# Patient Record
Sex: MALE | Race: WHITE | Employment: OTHER | URBAN - METROPOLITAN AREA
[De-identification: names, ages, dates, MRNs, and addresses within clinical notes are randomized per-mention and may not be internally consistent; named-entity substitution may affect disease eponyms.]

---

## 2017-01-09 LAB — DIABETIC RETINOPATHY: NEGATIVE

## 2018-04-10 DIAGNOSIS — E11.9 CONTROLLED TYPE 2 DIABETES MELLITUS WITHOUT COMPLICATION, UNSPECIFIED LONG TERM INSULIN USE STATUS: Primary | ICD-10-CM

## 2018-04-10 DIAGNOSIS — I10 ESSENTIAL HYPERTENSION: ICD-10-CM

## 2018-04-18 ENCOUNTER — HOSPITAL ENCOUNTER (OUTPATIENT)
Age: 68
Discharge: HOME | End: 2018-04-18
Payer: MEDICARE

## 2018-04-18 DIAGNOSIS — E11.9 CONTROLLED TYPE 2 DIABETES MELLITUS WITHOUT COMPLICATION, UNSPECIFIED LONG TERM INSULIN USE STATUS: ICD-10-CM

## 2018-04-18 DIAGNOSIS — I10 ESSENTIAL HYPERTENSION: ICD-10-CM

## 2018-04-18 LAB
ALBUMIN SERPL-MCNC: 5 G/DL (ref 3.5–5.2)
ALP BLD-CCNC: 47 U/L (ref 40–129)
ALT SERPL-CCNC: 21 U/L (ref 0–40)
ANION GAP SERPL CALCULATED.3IONS-SCNC: 15 MMOL/L (ref 7–16)
AST SERPL-CCNC: 15 U/L (ref 0–39)
BASOPHILS ABSOLUTE: 0.09 E9/L (ref 0–0.2)
BASOPHILS RELATIVE PERCENT: 0.9 % (ref 0–2)
BILIRUB SERPL-MCNC: 0.7 MG/DL (ref 0–1.2)
BUN BLDV-MCNC: 15 MG/DL (ref 8–23)
CALCIUM SERPL-MCNC: 9.8 MG/DL (ref 8.6–10.2)
CHLORIDE BLD-SCNC: 91 MMOL/L (ref 98–107)
CHOLESTEROL, TOTAL: 197 MG/DL (ref 0–199)
CO2: 27 MMOL/L (ref 22–29)
CREAT SERPL-MCNC: 1.1 MG/DL (ref 0.7–1.2)
EOSINOPHILS ABSOLUTE: 0.22 E9/L (ref 0.05–0.5)
EOSINOPHILS RELATIVE PERCENT: 2.3 % (ref 0–6)
GFR AFRICAN AMERICAN: >60
GFR NON-AFRICAN AMERICAN: >60 ML/MIN/1.73
GLUCOSE BLD-MCNC: 138 MG/DL (ref 74–109)
HBA1C MFR BLD: 7.8 % (ref 4.8–5.9)
HCT VFR BLD CALC: 43.4 % (ref 37–54)
HDLC SERPL-MCNC: 49 MG/DL
HEMOGLOBIN: 14.7 G/DL (ref 12.5–16.5)
IMMATURE GRANULOCYTES #: 0.09 E9/L
IMMATURE GRANULOCYTES %: 0.9 % (ref 0–5)
LDL CHOLESTEROL CALCULATED: 124 MG/DL (ref 0–99)
LYMPHOCYTES ABSOLUTE: 2.58 E9/L (ref 1.5–4)
LYMPHOCYTES RELATIVE PERCENT: 26.5 % (ref 20–42)
MCH RBC QN AUTO: 30 PG (ref 26–35)
MCHC RBC AUTO-ENTMCNC: 33.9 % (ref 32–34.5)
MCV RBC AUTO: 88.6 FL (ref 80–99.9)
MONOCYTES ABSOLUTE: 0.94 E9/L (ref 0.1–0.95)
MONOCYTES RELATIVE PERCENT: 9.7 % (ref 2–12)
NEUTROPHILS ABSOLUTE: 5.81 E9/L (ref 1.8–7.3)
NEUTROPHILS RELATIVE PERCENT: 59.7 % (ref 43–80)
PDW BLD-RTO: 13.2 FL (ref 11.5–15)
PLATELET # BLD: 244 E9/L (ref 130–450)
PMV BLD AUTO: 10.5 FL (ref 7–12)
POTASSIUM SERPL-SCNC: 4 MMOL/L (ref 3.5–5)
RBC # BLD: 4.9 E12/L (ref 3.8–5.8)
SODIUM BLD-SCNC: 133 MMOL/L (ref 132–146)
TOTAL PROTEIN: 8.4 G/DL (ref 6.4–8.3)
TRIGL SERPL-MCNC: 118 MG/DL (ref 0–149)
VLDLC SERPL CALC-MCNC: 24 MG/DL
WBC # BLD: 9.7 E9/L (ref 4.5–11.5)

## 2018-04-18 PROCEDURE — 80061 LIPID PANEL: CPT

## 2018-04-18 PROCEDURE — 83036 HEMOGLOBIN GLYCOSYLATED A1C: CPT

## 2018-04-18 PROCEDURE — 85025 COMPLETE CBC W/AUTO DIFF WBC: CPT

## 2018-04-18 PROCEDURE — 80053 COMPREHEN METABOLIC PANEL: CPT

## 2018-04-18 PROCEDURE — 36415 COLL VENOUS BLD VENIPUNCTURE: CPT

## 2018-04-26 ENCOUNTER — OFFICE VISIT (OUTPATIENT)
Dept: FAMILY MEDICINE CLINIC | Age: 68
End: 2018-04-26
Payer: MEDICARE

## 2018-04-26 VITALS
SYSTOLIC BLOOD PRESSURE: 120 MMHG | RESPIRATION RATE: 16 BRPM | WEIGHT: 216 LBS | HEIGHT: 70 IN | OXYGEN SATURATION: 97 % | BODY MASS INDEX: 30.92 KG/M2 | HEART RATE: 86 BPM | DIASTOLIC BLOOD PRESSURE: 80 MMHG

## 2018-04-26 DIAGNOSIS — R06.09 DYSPNEA ON EXERTION: ICD-10-CM

## 2018-04-26 DIAGNOSIS — Z79.899 MEDICATION MANAGEMENT: ICD-10-CM

## 2018-04-26 DIAGNOSIS — E11.9 TYPE 2 DIABETES MELLITUS WITHOUT COMPLICATION, WITHOUT LONG-TERM CURRENT USE OF INSULIN (HCC): Primary | ICD-10-CM

## 2018-04-26 DIAGNOSIS — I48.91 NEW ONSET ATRIAL FIBRILLATION (HCC): ICD-10-CM

## 2018-04-26 LAB
DLCO %PRED: NORMAL
DLCO/VA %PRED: NORMAL
DLCO: NORMAL ML/MMHG SEC
FEF 25-75% PRE PRED: NORMAL
FEF 25-75%-PRE: NORMAL
FEV1 %PRED-PRE: NORMAL
FEV1/FVC PRED: NORMAL
FEV1/FVC: NORMAL %
FEV1: NORMAL LITERS
FEV3 PRE: NORMAL
FVC %PRED-PRE: NORMAL
FVC: NORMAL LITERS
MEP: NORMAL
MIP: NORMAL
PEF %PRED-PRE: NORMAL
PEF-PRE: NORMAL L/SEC
TLC %PRED: NORMAL
TLC: NORMAL LITERS

## 2018-04-26 PROCEDURE — 93000 ELECTROCARDIOGRAM COMPLETE: CPT | Performed by: FAMILY MEDICINE

## 2018-04-26 PROCEDURE — 99214 OFFICE O/P EST MOD 30 MIN: CPT | Performed by: FAMILY MEDICINE

## 2018-04-26 RX ORDER — LISINOPRIL AND HYDROCHLOROTHIAZIDE 25; 20 MG/1; MG/1
1 TABLET ORAL DAILY
Qty: 90 TABLET | Refills: 3 | Status: SHIPPED | OUTPATIENT
Start: 2018-04-26 | End: 2018-06-19 | Stop reason: SDUPTHER

## 2018-04-26 RX ORDER — METOPROLOL SUCCINATE 25 MG/1
25 TABLET, EXTENDED RELEASE ORAL DAILY
Qty: 30 TABLET | Refills: 3 | Status: SHIPPED | OUTPATIENT
Start: 2018-04-26 | End: 2018-08-12 | Stop reason: SDUPTHER

## 2018-04-26 RX ORDER — ATORVASTATIN CALCIUM 80 MG/1
80 TABLET, FILM COATED ORAL DAILY
Qty: 90 TABLET | Refills: 3 | Status: SHIPPED | OUTPATIENT
Start: 2018-04-26 | End: 2019-04-02 | Stop reason: SDUPTHER

## 2018-04-26 ASSESSMENT — ENCOUNTER SYMPTOMS
HEARTBURN: 0
NAUSEA: 0
WHEEZING: 0
COUGH: 0
HEMOPTYSIS: 0
ABDOMINAL PAIN: 0
SHORTNESS OF BREATH: 1
SPUTUM PRODUCTION: 0
BACK PAIN: 0
VOMITING: 0

## 2018-04-26 ASSESSMENT — PATIENT HEALTH QUESTIONNAIRE - PHQ9
SUM OF ALL RESPONSES TO PHQ QUESTIONS 1-9: 0
SUM OF ALL RESPONSES TO PHQ9 QUESTIONS 1 & 2: 0
2. FEELING DOWN, DEPRESSED OR HOPELESS: 0
1. LITTLE INTEREST OR PLEASURE IN DOING THINGS: 0

## 2018-05-03 ENCOUNTER — HOSPITAL ENCOUNTER (OUTPATIENT)
Dept: PULMONOLOGY | Age: 68
Discharge: HOME | End: 2018-05-03
Payer: MEDICARE

## 2018-05-03 PROCEDURE — 94010 BREATHING CAPACITY TEST: CPT

## 2018-05-08 ENCOUNTER — OFFICE VISIT (OUTPATIENT)
Dept: CARDIOLOGY CLINIC | Age: 68
End: 2018-05-08
Payer: MEDICARE

## 2018-05-08 VITALS
WEIGHT: 218 LBS | HEIGHT: 69 IN | SYSTOLIC BLOOD PRESSURE: 122 MMHG | DIASTOLIC BLOOD PRESSURE: 82 MMHG | BODY MASS INDEX: 32.29 KG/M2 | HEART RATE: 83 BPM | RESPIRATION RATE: 18 BRPM

## 2018-05-08 DIAGNOSIS — I10 ESSENTIAL HYPERTENSION: Primary | ICD-10-CM

## 2018-05-08 DIAGNOSIS — R07.9 CHEST PAIN, UNSPECIFIED TYPE: ICD-10-CM

## 2018-05-08 DIAGNOSIS — I48.91 ATRIAL FIBRILLATION, UNSPECIFIED TYPE (HCC): ICD-10-CM

## 2018-05-08 PROCEDURE — 99203 OFFICE O/P NEW LOW 30 MIN: CPT | Performed by: INTERNAL MEDICINE

## 2018-05-08 PROCEDURE — 93000 ELECTROCARDIOGRAM COMPLETE: CPT | Performed by: INTERNAL MEDICINE

## 2018-05-08 RX ORDER — ESOMEPRAZOLE MAGNESIUM 40 MG/1
40 CAPSULE, DELAYED RELEASE ORAL DAILY
COMMUNITY

## 2018-05-17 ENCOUNTER — HOSPITAL ENCOUNTER (OUTPATIENT)
Dept: CARDIOLOGY | Age: 68
Discharge: HOME | End: 2018-05-17
Payer: MEDICARE

## 2018-05-17 VITALS
WEIGHT: 218 LBS | SYSTOLIC BLOOD PRESSURE: 114 MMHG | HEART RATE: 83 BPM | HEIGHT: 69 IN | BODY MASS INDEX: 32.29 KG/M2 | DIASTOLIC BLOOD PRESSURE: 74 MMHG

## 2018-05-17 DIAGNOSIS — I10 ESSENTIAL HYPERTENSION: ICD-10-CM

## 2018-05-17 DIAGNOSIS — R07.9 CHEST PAIN, UNSPECIFIED TYPE: Primary | ICD-10-CM

## 2018-05-17 DIAGNOSIS — I48.91 ATRIAL FIBRILLATION, UNSPECIFIED TYPE (HCC): ICD-10-CM

## 2018-05-17 LAB
LV EF: 60 %
LVEF MODALITY: NORMAL

## 2018-05-17 PROCEDURE — 2580000003 HC RX 258: Performed by: INTERNAL MEDICINE

## 2018-05-17 PROCEDURE — 6360000002 HC RX W HCPCS: Performed by: INTERNAL MEDICINE

## 2018-05-17 PROCEDURE — 78452 HT MUSCLE IMAGE SPECT MULT: CPT

## 2018-05-17 PROCEDURE — A9500 TC99M SESTAMIBI: HCPCS | Performed by: INTERNAL MEDICINE

## 2018-05-17 PROCEDURE — 93306 TTE W/DOPPLER COMPLETE: CPT

## 2018-05-17 PROCEDURE — 3430000000 HC RX DIAGNOSTIC RADIOPHARMACEUTICAL: Performed by: INTERNAL MEDICINE

## 2018-05-17 PROCEDURE — 93017 CV STRESS TEST TRACING ONLY: CPT

## 2018-05-17 RX ORDER — SODIUM CHLORIDE 0.9 % (FLUSH) 0.9 %
10 SYRINGE (ML) INJECTION PRN
Status: DISCONTINUED | OUTPATIENT
Start: 2018-05-17 | End: 2018-05-18 | Stop reason: HOSPADM

## 2018-05-17 RX ADMIN — Medication 10 ML: at 08:00

## 2018-05-17 RX ADMIN — Medication 10 ML: at 06:47

## 2018-05-17 RX ADMIN — Medication 31.7 MILLICURIE: at 07:59

## 2018-05-17 RX ADMIN — Medication 10 ML: at 07:59

## 2018-05-17 RX ADMIN — REGADENOSON 0.4 MG: 0.08 INJECTION, SOLUTION INTRAVENOUS at 07:59

## 2018-05-17 RX ADMIN — Medication 10.1 MILLICURIE: at 06:47

## 2018-05-21 ENCOUNTER — OFFICE VISIT (OUTPATIENT)
Dept: FAMILY MEDICINE CLINIC | Age: 68
End: 2018-05-21
Payer: MEDICARE

## 2018-05-21 VITALS
DIASTOLIC BLOOD PRESSURE: 80 MMHG | RESPIRATION RATE: 16 BRPM | BODY MASS INDEX: 32.58 KG/M2 | SYSTOLIC BLOOD PRESSURE: 120 MMHG | HEIGHT: 69 IN | HEART RATE: 72 BPM | OXYGEN SATURATION: 96 % | WEIGHT: 220 LBS

## 2018-05-21 DIAGNOSIS — J42 CHRONIC BRONCHITIS, UNSPECIFIED CHRONIC BRONCHITIS TYPE (HCC): ICD-10-CM

## 2018-05-21 DIAGNOSIS — J30.2 CHRONIC SEASONAL ALLERGIC RHINITIS, UNSPECIFIED TRIGGER: ICD-10-CM

## 2018-05-21 DIAGNOSIS — I48.91 ATRIAL FIBRILLATION, UNSPECIFIED TYPE (HCC): Primary | ICD-10-CM

## 2018-05-21 PROCEDURE — 99214 OFFICE O/P EST MOD 30 MIN: CPT | Performed by: FAMILY MEDICINE

## 2018-05-21 RX ORDER — LEVOCETIRIZINE DIHYDROCHLORIDE 5 MG/1
5 TABLET, FILM COATED ORAL NIGHTLY
Qty: 30 TABLET | Refills: 11 | COMMUNITY
Start: 2018-05-21 | End: 2018-07-27

## 2018-05-21 RX ORDER — FLUTICASONE FUROATE AND VILANTEROL 100; 25 UG/1; UG/1
1 POWDER RESPIRATORY (INHALATION) DAILY
Qty: 1 EACH | Refills: 5 | Status: SHIPPED | OUTPATIENT
Start: 2018-05-21 | End: 2018-10-11

## 2018-05-21 RX ORDER — AZELASTINE 1 MG/ML
1 SPRAY, METERED NASAL 2 TIMES DAILY
Qty: 1 BOTTLE | Refills: 5 | Status: SHIPPED | OUTPATIENT
Start: 2018-05-21 | End: 2018-10-11

## 2018-05-21 ASSESSMENT — ENCOUNTER SYMPTOMS
SHORTNESS OF BREATH: 1
COUGH: 1
WHEEZING: 0
ABDOMINAL PAIN: 0

## 2018-05-22 ENCOUNTER — TELEPHONE (OUTPATIENT)
Dept: CARDIOLOGY CLINIC | Age: 68
End: 2018-05-22

## 2018-05-31 ENCOUNTER — OFFICE VISIT (OUTPATIENT)
Dept: NON INVASIVE DIAGNOSTICS | Age: 68
End: 2018-05-31
Payer: MEDICARE

## 2018-05-31 VITALS
HEIGHT: 69 IN | SYSTOLIC BLOOD PRESSURE: 112 MMHG | BODY MASS INDEX: 32.53 KG/M2 | DIASTOLIC BLOOD PRESSURE: 76 MMHG | HEART RATE: 74 BPM | RESPIRATION RATE: 18 BRPM | WEIGHT: 219.6 LBS

## 2018-05-31 DIAGNOSIS — I48.19 PERSISTENT ATRIAL FIBRILLATION (HCC): Primary | ICD-10-CM

## 2018-05-31 DIAGNOSIS — E66.09 CLASS 1 OBESITY DUE TO EXCESS CALORIES WITH SERIOUS COMORBIDITY AND BODY MASS INDEX (BMI) OF 32.0 TO 32.9 IN ADULT: ICD-10-CM

## 2018-05-31 DIAGNOSIS — I10 ESSENTIAL HYPERTENSION: ICD-10-CM

## 2018-05-31 DIAGNOSIS — G47.30 SLEEP-DISORDERED BREATHING: ICD-10-CM

## 2018-05-31 PROCEDURE — 93000 ELECTROCARDIOGRAM COMPLETE: CPT | Performed by: INTERNAL MEDICINE

## 2018-05-31 PROCEDURE — 99204 OFFICE O/P NEW MOD 45 MIN: CPT | Performed by: INTERNAL MEDICINE

## 2018-06-06 PROBLEM — E66.811 CLASS 1 OBESITY DUE TO EXCESS CALORIES WITH SERIOUS COMORBIDITY AND BODY MASS INDEX (BMI) OF 32.0 TO 32.9 IN ADULT: Status: ACTIVE | Noted: 2018-06-06

## 2018-06-06 PROBLEM — G47.30 SLEEP-DISORDERED BREATHING: Status: ACTIVE | Noted: 2018-06-06

## 2018-06-06 PROBLEM — E66.09 CLASS 1 OBESITY DUE TO EXCESS CALORIES WITH SERIOUS COMORBIDITY AND BODY MASS INDEX (BMI) OF 32.0 TO 32.9 IN ADULT: Status: ACTIVE | Noted: 2018-06-06

## 2018-06-06 PROBLEM — I48.19 PERSISTENT ATRIAL FIBRILLATION (HCC): Status: ACTIVE | Noted: 2018-06-06

## 2018-06-13 ENCOUNTER — ANESTHESIA (OUTPATIENT)
Dept: INPATIENT UNIT | Age: 68
End: 2018-06-13

## 2018-06-13 ENCOUNTER — ANESTHESIA EVENT (OUTPATIENT)
Dept: INPATIENT UNIT | Age: 68
End: 2018-06-13

## 2018-06-13 ENCOUNTER — HOSPITAL ENCOUNTER (OUTPATIENT)
Dept: INPATIENT UNIT | Age: 68
Discharge: HOME | End: 2018-06-13
Payer: MEDICARE

## 2018-06-13 VITALS
DIASTOLIC BLOOD PRESSURE: 66 MMHG | RESPIRATION RATE: 11 BRPM | OXYGEN SATURATION: 100 % | SYSTOLIC BLOOD PRESSURE: 115 MMHG

## 2018-06-13 VITALS
SYSTOLIC BLOOD PRESSURE: 119 MMHG | WEIGHT: 219 LBS | OXYGEN SATURATION: 94 % | BODY MASS INDEX: 32.44 KG/M2 | TEMPERATURE: 97.8 F | HEART RATE: 76 BPM | HEIGHT: 69 IN | DIASTOLIC BLOOD PRESSURE: 71 MMHG | RESPIRATION RATE: 16 BRPM

## 2018-06-13 LAB — METER GLUCOSE: 152 MG/DL (ref 70–110)

## 2018-06-13 PROCEDURE — 7100000010 HC PHASE II RECOVERY - FIRST 15 MIN

## 2018-06-13 PROCEDURE — 82962 GLUCOSE BLOOD TEST: CPT

## 2018-06-13 PROCEDURE — 2580000003 HC RX 258: Performed by: INTERNAL MEDICINE

## 2018-06-13 PROCEDURE — 92960 CARDIOVERSION ELECTRIC EXT: CPT | Performed by: INTERNAL MEDICINE

## 2018-06-13 PROCEDURE — 3700000000 HC ANESTHESIA ATTENDED CARE

## 2018-06-13 PROCEDURE — 3700000001 HC ADD 15 MINUTES (ANESTHESIA)

## 2018-06-13 PROCEDURE — 7100000011 HC PHASE II RECOVERY - ADDTL 15 MIN

## 2018-06-13 PROCEDURE — 6360000002 HC RX W HCPCS: Performed by: NURSE ANESTHETIST, CERTIFIED REGISTERED

## 2018-06-13 PROCEDURE — 92960 CARDIOVERSION ELECTRIC EXT: CPT

## 2018-06-13 RX ORDER — SODIUM CHLORIDE 0.9 % (FLUSH) 0.9 %
10 SYRINGE (ML) INJECTION PRN
Status: DISCONTINUED | OUTPATIENT
Start: 2018-06-13 | End: 2018-06-14 | Stop reason: HOSPADM

## 2018-06-13 RX ORDER — SODIUM CHLORIDE 0.9 % (FLUSH) 0.9 %
10 SYRINGE (ML) INJECTION EVERY 12 HOURS SCHEDULED
Status: DISCONTINUED | OUTPATIENT
Start: 2018-06-13 | End: 2018-06-13 | Stop reason: SDUPTHER

## 2018-06-13 RX ORDER — SODIUM CHLORIDE 9 MG/ML
INJECTION, SOLUTION INTRAVENOUS CONTINUOUS
Status: DISCONTINUED | OUTPATIENT
Start: 2018-06-13 | End: 2018-06-14 | Stop reason: HOSPADM

## 2018-06-13 RX ORDER — SODIUM CHLORIDE 0.9 % (FLUSH) 0.9 %
10 SYRINGE (ML) INJECTION EVERY 12 HOURS SCHEDULED
Status: DISCONTINUED | OUTPATIENT
Start: 2018-06-13 | End: 2018-06-14 | Stop reason: HOSPADM

## 2018-06-13 RX ORDER — SODIUM CHLORIDE 0.9 % (FLUSH) 0.9 %
10 SYRINGE (ML) INJECTION PRN
Status: DISCONTINUED | OUTPATIENT
Start: 2018-06-13 | End: 2018-06-13 | Stop reason: SDUPTHER

## 2018-06-13 RX ORDER — PROPOFOL 10 MG/ML
INJECTION, EMULSION INTRAVENOUS PRN
Status: DISCONTINUED | OUTPATIENT
Start: 2018-06-13 | End: 2018-06-13 | Stop reason: SDUPTHER

## 2018-06-13 RX ADMIN — PROPOFOL 160 MG: 10 INJECTION, EMULSION INTRAVENOUS at 14:52

## 2018-06-13 RX ADMIN — SODIUM CHLORIDE: 9 INJECTION, SOLUTION INTRAVENOUS at 14:30

## 2018-06-13 ASSESSMENT — PAIN SCALES - GENERAL
PAINLEVEL_OUTOF10: 0

## 2018-06-13 ASSESSMENT — ENCOUNTER SYMPTOMS: SHORTNESS OF BREATH: 1

## 2018-06-13 ASSESSMENT — COPD QUESTIONNAIRES: CAT_SEVERITY: MODERATE

## 2018-06-14 LAB
EKG ATRIAL RATE: 66 BPM
EKG P AXIS: 41 DEGREES
EKG P-R INTERVAL: 210 MS
EKG Q-T INTERVAL: 412 MS
EKG QRS DURATION: 88 MS
EKG QTC CALCULATION (BAZETT): 431 MS
EKG R AXIS: 10 DEGREES
EKG T AXIS: 34 DEGREES
EKG VENTRICULAR RATE: 66 BPM

## 2018-06-19 DIAGNOSIS — Z79.899 MEDICATION MANAGEMENT: ICD-10-CM

## 2018-06-19 RX ORDER — LISINOPRIL AND HYDROCHLOROTHIAZIDE 25; 20 MG/1; MG/1
1 TABLET ORAL DAILY
Qty: 90 TABLET | Refills: 3 | Status: SHIPPED | OUTPATIENT
Start: 2018-06-19 | End: 2019-04-24 | Stop reason: SDUPTHER

## 2018-07-10 ENCOUNTER — OFFICE VISIT (OUTPATIENT)
Dept: NON INVASIVE DIAGNOSTICS | Age: 68
End: 2018-07-10
Payer: MEDICARE

## 2018-07-10 VITALS
WEIGHT: 222.2 LBS | BODY MASS INDEX: 32.91 KG/M2 | HEART RATE: 77 BPM | RESPIRATION RATE: 16 BRPM | HEIGHT: 69 IN | SYSTOLIC BLOOD PRESSURE: 112 MMHG | DIASTOLIC BLOOD PRESSURE: 80 MMHG

## 2018-07-10 DIAGNOSIS — I10 ESSENTIAL HYPERTENSION: ICD-10-CM

## 2018-07-10 DIAGNOSIS — G47.30 SLEEP-DISORDERED BREATHING: ICD-10-CM

## 2018-07-10 DIAGNOSIS — E66.09 CLASS 1 OBESITY DUE TO EXCESS CALORIES WITH SERIOUS COMORBIDITY AND BODY MASS INDEX (BMI) OF 32.0 TO 32.9 IN ADULT: ICD-10-CM

## 2018-07-10 DIAGNOSIS — I48.19 PERSISTENT ATRIAL FIBRILLATION (HCC): Primary | ICD-10-CM

## 2018-07-10 PROCEDURE — 93000 ELECTROCARDIOGRAM COMPLETE: CPT | Performed by: INTERNAL MEDICINE

## 2018-07-10 PROCEDURE — 99213 OFFICE O/P EST LOW 20 MIN: CPT | Performed by: INTERNAL MEDICINE

## 2018-07-10 NOTE — PROGRESS NOTES
[id number]    Procedure Date        05/17/2018      Corporate ID                      Ordering Physician   Mireya Vaz MD      Accession Number     [id number]    Referring Physician   Mireya Vaz MD                                                           Edwin Single      Date of Birth        1950   Sonographer           Nicole Swenson [facility]      Age                  68 year(s)   Interpreting         Mireya Vaz MD                                     Physician                                        Any Other     Procedure    Type of Study      TTE procedure:Echo Complete W/ Dop & Color Flow.     Procedure Date  Date: 05/17/2018 Start: 08:55 AM    Study Location: Echo Lab  Technical Quality: Adequate visualization    Indications:Atrial fibrillation.     Patient Status: Routine    Height: 69 inches Weight: 218 pounds BSA: 2.14 m^2 BMI: 32.19 kg/m^2    BP: 114/74 mmHg     Findings      Left Ventricle   Normal left ventricle size.   Normal left ventricle wall thickness.   No wall motion abnormalities.   Normal diastolic function.   Ejection fraction is visually estimated at 55-65%.      Right Ventricle   Normal right ventricular size and function.      Left Atrium   Left atrium is of normal size.   Atrial fibrillation      Right Atrium   Normal right atrium size.      Mitral Valve   Normal mitral valve structure and function.   Physiologic and/or trace mitral regurgitation is present.      Tricuspid Valve   Normal tricuspid valve structure and function.      Aortic Valve   Aortic valve opens well.   The aortic valve is trileaflet.   The aortic valve appears mildly sclerotic.      Pulmonic Valve   The pulmonic valve was not well visualized.      Pericardial Effusion   No evidence of pericardial effusion.      Aorta   Aortic root dimension within normal limits.      Conclusions      Summary   Normal diastolic function.   Ejection fraction is visually estimated at 55-65%.   Left atrium is of normal size.   Atrial fibrillation   Normal mitral valve structure and function.   The aortic valve is trileaflet.   The aortic valve appears mildly sclerotic.   No evidence of pericardial effusion.      Signature      ----------------------------------------------------------------   Electronically signed by Junior Mills MD(Interpreting   physician) on 05/17/2018 07:35 PM   ----------------------------------------------------------------     M-Mode/2D Measurements & Calculations      LV Diastolic    LV Systolic Dimension: 2.8   AV Cusp Separation: 1.9 cmLA   Dimension: 4.1  cm                           Dimension: 4.2 cmAO Root   cm              LV Volume Diastolic: 54.7 ml Dimension: 2.9 cm   LV FS:31.7 %    LV Volume Systolic: 56.0 ml   LV PW           LV EDV/LV EDV Index: 67.4   Diastolic: 1.2  CU/07 VD/K^5IL ESV/LV ESV   cm              Index: 29.4 ml/14ml/ m^2     RV Diastolic Dimension: 2.6   LV PW Systolic: EF Calculated: 00.8 %        cm   1.5 cm          LV Mass Index: 80 l/min*m^2   Septum          LV Length: 8.1 cm            LA/Aorta: 2   Diastolic: 1.2   cm              LVOT: 2.3 cm                 LA volume/Index: 58.7 ml   Septum                                       /78SP/Q^1   Systolic: 1.4                                RA Area: 16.7 cm^2   cm      LV Mass: 171.75   g     Doppler Measurements & Calculations      MV Peak E-Wave: 1.57 AV Peak Velocity: 1.32 LVOT Peak Velocity: 0.95 m/s   m/s                  m/s                    LVOT Mean Velocity: 0.73 m/s                        AV Peak Gradient: 6.95 LVOT Peak Gradient: 3.6   MV Peak Gradient:    mmHg                   mmHgLVOT Mean Gradient: 2.3   9.9 mmHg             AV Mean Velocity: 0.95 mmHg   MV Mean Gradient:    m/s   4.3 mmHg             AV Mean Gradient: 4   MV Mean Velocity:    mmHg   0.92 m/s             AV VTI: 27.2 cm   MV Deceleration      AV Area   Time: 152.6 msec     (Continuity):3.42 cm^2 PV Peak Velocity: 1.21 m/s   MV P1/2t: 64.4 years ago he had the same symptoms - but there was not workup done and attributed the symptoms to GERD. His last know time of being in sinus rhythm was 3 years ago. The patient presents today in atrial fibrillation and currently denies any chest pain, SOB or syncope. When in AF he feels a lack of energy and a pounding in his chest. The states he stopped smoking but did smoke for 25 years. The patient reports he has never been worked up for sleep apnea - the patient's wife states the patient does snore and he states he does not get good quality sleep. Discussed with the patient about rhythm control options including cardioversion vs AAD therapy (Tikosyn or sotalol) vs catheter ablation, explaining the risks and benefits of the procedure (see below). Also discussed lifestyle modifications (see below). The patient does report alcohol use. 1. Persistent atrial fibrillation  - CHADSVASC= 3 (HTN, DM, Age)  - their current [address] regiment includes Eliquis  - current medical therapy includes Eliquis and BB  - thus far has had 1 cardioversion with ERAF  - we had an extensive discussion regarding options between rate and rhythm control and he is currently using Toprol for rate control  - we had an extensive discussion regarding all 2ndary causes of AFib which include but are not limited to the following and then need for lifestyle modifications and stringent control of contributing medical conditions which include  - We recommended aggressive risk factor modification as previously discussed  - he has sleep study pending  - given ERAF, we discussed ablation yet again and due to his symptomatic improvement, I would recommend persistent AF ablation with RF (PVI + trigger ablation)  - discussed R/B/A previously and he is willing to proceed now (see my consult)    2. Hyperlipidemia  - managed by PCP  - on lipitor    3. HTN  - well controlled at this visit   - continue current medications    4.  DM  - managed by PCP  - on

## 2018-07-17 DIAGNOSIS — I48.19 PERSISTENT ATRIAL FIBRILLATION (HCC): Primary | ICD-10-CM

## 2018-07-17 DIAGNOSIS — Z79.899 MEDICATION MANAGEMENT: ICD-10-CM

## 2018-07-17 DIAGNOSIS — I48.91 NEW ONSET ATRIAL FIBRILLATION (HCC): ICD-10-CM

## 2018-07-17 NOTE — TELEPHONE ENCOUNTER
From: Zena Mcdowell  Sent: 7/17/2018 6:48 AM EDT  Subject: Medication Renewal Request    Zena Mcdowell would like a refill of the following medications:     apixaban (ELIQUIS) 5 MG TABS tablet Jordin Esqueda MD]    Preferred pharmacy: [facility], [address] [phone]    Comment:

## 2018-07-17 NOTE — TELEPHONE ENCOUNTER
From: Polly Miles  Sent: 7/17/2018 12:14 PM EDT  Subject: Medication Renewal Request    Polly Miles would like a refill of the following medications:     apixaban (ELIQUIS) 5 MG TABS tablet Sydnee Romero MD]    Preferred pharmacy: [facility], [address] [phone]    Comment:

## 2018-07-24 ENCOUNTER — OFFICE VISIT (OUTPATIENT)
Dept: FAMILY MEDICINE CLINIC | Age: 68
End: 2018-07-24
Payer: MEDICARE

## 2018-07-24 VITALS
DIASTOLIC BLOOD PRESSURE: 62 MMHG | RESPIRATION RATE: 16 BRPM | SYSTOLIC BLOOD PRESSURE: 99 MMHG | BODY MASS INDEX: 32.58 KG/M2 | HEIGHT: 69 IN | WEIGHT: 220 LBS | HEART RATE: 87 BPM | OXYGEN SATURATION: 98 %

## 2018-07-24 DIAGNOSIS — I48.91 ATRIAL FIBRILLATION, UNSPECIFIED TYPE (HCC): Primary | ICD-10-CM

## 2018-07-24 DIAGNOSIS — F41.8 SITUATIONAL ANXIETY: ICD-10-CM

## 2018-07-24 DIAGNOSIS — E11.9 TYPE 2 DIABETES MELLITUS WITHOUT COMPLICATION, WITHOUT LONG-TERM CURRENT USE OF INSULIN (HCC): ICD-10-CM

## 2018-07-24 LAB — HBA1C MFR BLD: 8.4 %

## 2018-07-24 PROCEDURE — 83036 HEMOGLOBIN GLYCOSYLATED A1C: CPT | Performed by: FAMILY MEDICINE

## 2018-07-24 PROCEDURE — 99214 OFFICE O/P EST MOD 30 MIN: CPT | Performed by: FAMILY MEDICINE

## 2018-07-24 RX ORDER — GLIPIZIDE 2.5 MG/1
2.5 TABLET, EXTENDED RELEASE ORAL DAILY
Qty: 30 TABLET | Refills: 5 | Status: SHIPPED | OUTPATIENT
Start: 2018-07-24 | End: 2018-08-21

## 2018-07-24 RX ORDER — DIAZEPAM 5 MG/1
TABLET ORAL
Qty: 2 TABLET | Refills: 0 | Status: SHIPPED | OUTPATIENT
Start: 2018-07-24 | End: 2018-07-27

## 2018-07-24 ASSESSMENT — ENCOUNTER SYMPTOMS
WHEEZING: 0
ABDOMINAL PAIN: 0
COUGH: 0
SHORTNESS OF BREATH: 1

## 2018-07-24 NOTE — PROGRESS NOTES
CC   Chief Complaint   Patient presents with    Diabetes    COPD     HPI:   Patient comes in today for the below issue(s). Atrial fibrillation  Follow up  Saw EP  Failed cardioversion  Remains on anticoagulation and rate control  Ablation is being considered- scheduled on August 1. Has pre-operative evaluation on Friday  Overall continues to not feel the palpitations, but is noting ongoing decreased stamina/SOB. Also had a negative sleep study. Concern was that CATRINA was contributing to his overall picture. Admits to fear of upcoming scans and claustrophobia. Asking for something before the test to help him relax. DM2:    Chronic issue, present for years  Patient feels well. Issue is somewhat controlled. Patient does not have complaints or concerns today. Medications reviewed. Currently on Januvia. Taking all medications and tolerating well. Glucose screens being checked  yes - sporadic. Was running over 200  hypoglycemic episodes no  Patient is taking ASA No: on DOAC  Taking  Ace Inhibitor/ARB. Yes  Patient is  on appropriately-dosed statin. Patient does not smoke. Admits to not following a diet and is questioning what he should be eating. Most recent labs reviewed with patient. Lab Results   Component Value Date    LABA1C 8.4 07/24/2018     No results found for: EAG  Lab Results   Component Value Date    LDLCALC 124 (H) 04/18/2018           Review of Systems  Review of Systems   Constitutional: Positive for malaise/fatigue. Negative for chills and fever. HENT: Negative for congestion. Respiratory: Positive for shortness of breath. Negative for cough and wheezing. Cardiovascular: Negative for chest pain, palpitations and leg swelling. Gastrointestinal: Negative for abdominal pain. Neurological: Negative for dizziness and loss of consciousness.        Past Medical History:   Diagnosis Date    [facility]     follows with Dr. Ruchi Au Allergic rhinitis    

## 2018-07-27 ENCOUNTER — HOSPITAL ENCOUNTER (OUTPATIENT)
Dept: PREADMISSION TESTING | Age: 68
Discharge: HOME | End: 2018-07-27
Payer: MEDICARE

## 2018-07-27 ENCOUNTER — HOSPITAL ENCOUNTER (OUTPATIENT)
Dept: CT IMAGING | Age: 68
Discharge: HOME | End: 2018-07-29
Payer: MEDICARE

## 2018-07-27 ENCOUNTER — HOSPITAL ENCOUNTER (OUTPATIENT)
Age: 68
Discharge: HOME | End: 2018-07-27
Payer: MEDICARE

## 2018-07-27 VITALS
OXYGEN SATURATION: 98 % | BODY MASS INDEX: 31.99 KG/M2 | HEIGHT: 69 IN | WEIGHT: 216 LBS | HEART RATE: 81 BPM | RESPIRATION RATE: 18 BRPM | SYSTOLIC BLOOD PRESSURE: 134 MMHG | DIASTOLIC BLOOD PRESSURE: 65 MMHG | TEMPERATURE: 97.8 F

## 2018-07-27 DIAGNOSIS — I48.19 PERSISTENT ATRIAL FIBRILLATION (HCC): ICD-10-CM

## 2018-07-27 DIAGNOSIS — Z01.812 PRE-OPERATIVE LABORATORY EXAMINATION: Primary | ICD-10-CM

## 2018-07-27 LAB
ABO/RH: NORMAL
ANION GAP SERPL CALCULATED.3IONS-SCNC: 12 MMOL/L (ref 7–16)
ANION GAP SERPL CALCULATED.3IONS-SCNC: 13 MMOL/L (ref 7–16)
ANTIBODY SCREEN: NORMAL
APTT: 33.5 SEC (ref 24.5–35.1)
BASOPHILS ABSOLUTE: 0.04 E9/L (ref 0–0.2)
BASOPHILS RELATIVE PERCENT: 0.5 % (ref 0–2)
BUN BLDV-MCNC: 18 MG/DL (ref 8–23)
BUN BLDV-MCNC: 19 MG/DL (ref 8–23)
CALCIUM SERPL-MCNC: 10.2 MG/DL (ref 8.6–10.2)
CALCIUM SERPL-MCNC: 9.8 MG/DL (ref 8.6–10.2)
CHLORIDE BLD-SCNC: 94 MMOL/L (ref 98–107)
CHLORIDE BLD-SCNC: 95 MMOL/L (ref 98–107)
CO2: 30 MMOL/L (ref 22–29)
CO2: 30 MMOL/L (ref 22–29)
CREAT SERPL-MCNC: 1.1 MG/DL (ref 0.7–1.2)
CREAT SERPL-MCNC: 1.2 MG/DL (ref 0.7–1.2)
EOSINOPHILS ABSOLUTE: 0.32 E9/L (ref 0.05–0.5)
EOSINOPHILS RELATIVE PERCENT: 4.1 % (ref 0–6)
GFR AFRICAN AMERICAN: >60
GFR AFRICAN AMERICAN: >60
GFR NON-AFRICAN AMERICAN: >60 ML/MIN/1.73
GFR NON-AFRICAN AMERICAN: >60 ML/MIN/1.73
GLUCOSE BLD-MCNC: 187 MG/DL (ref 74–109)
GLUCOSE BLD-MCNC: 196 MG/DL (ref 74–109)
HCT VFR BLD CALC: 42.3 % (ref 37–54)
HEMOGLOBIN: 14.5 G/DL (ref 12.5–16.5)
IMMATURE GRANULOCYTES #: 0.04 E9/L
IMMATURE GRANULOCYTES %: 0.5 % (ref 0–5)
INR BLD: 1.5
LYMPHOCYTES ABSOLUTE: 1.26 E9/L (ref 1.5–4)
LYMPHOCYTES RELATIVE PERCENT: 16.2 % (ref 20–42)
MAGNESIUM: 1.6 MG/DL (ref 1.6–2.6)
MCH RBC QN AUTO: 29.7 PG (ref 26–35)
MCHC RBC AUTO-ENTMCNC: 34.3 % (ref 32–34.5)
MCV RBC AUTO: 86.5 FL (ref 80–99.9)
MONOCYTES ABSOLUTE: 0.71 E9/L (ref 0.1–0.95)
MONOCYTES RELATIVE PERCENT: 9.1 % (ref 2–12)
NEUTROPHILS ABSOLUTE: 5.41 E9/L (ref 1.8–7.3)
NEUTROPHILS RELATIVE PERCENT: 69.6 % (ref 43–80)
PDW BLD-RTO: 12.7 FL (ref 11.5–15)
PLATELET # BLD: 182 E9/L (ref 130–450)
PMV BLD AUTO: 10.6 FL (ref 7–12)
POTASSIUM SERPL-SCNC: 4.3 MMOL/L (ref 3.5–5)
POTASSIUM SERPL-SCNC: 4.4 MMOL/L (ref 3.5–5)
PROTHROMBIN TIME: 17.1 SEC (ref 9.3–12.4)
RBC # BLD: 4.89 E12/L (ref 3.8–5.8)
SODIUM BLD-SCNC: 136 MMOL/L (ref 132–146)
SODIUM BLD-SCNC: 138 MMOL/L (ref 132–146)
WBC # BLD: 7.8 E9/L (ref 4.5–11.5)

## 2018-07-27 PROCEDURE — 80048 BASIC METABOLIC PNL TOTAL CA: CPT

## 2018-07-27 PROCEDURE — 36415 COLL VENOUS BLD VENIPUNCTURE: CPT

## 2018-07-27 PROCEDURE — 83735 ASSAY OF MAGNESIUM: CPT

## 2018-07-27 PROCEDURE — 86900 BLOOD TYPING SEROLOGIC ABO: CPT

## 2018-07-27 PROCEDURE — 85610 PROTHROMBIN TIME: CPT

## 2018-07-27 PROCEDURE — 85730 THROMBOPLASTIN TIME PARTIAL: CPT

## 2018-07-27 PROCEDURE — 86901 BLOOD TYPING SEROLOGIC RH(D): CPT

## 2018-07-27 PROCEDURE — 6360000004 HC RX CONTRAST MEDICATION: Performed by: RADIOLOGY

## 2018-07-27 PROCEDURE — 86850 RBC ANTIBODY SCREEN: CPT

## 2018-07-27 PROCEDURE — 85025 COMPLETE CBC W/AUTO DIFF WBC: CPT

## 2018-07-27 PROCEDURE — 75572 CT HRT W/3D IMAGE: CPT

## 2018-07-27 RX ADMIN — IOPAMIDOL 60 ML: 755 INJECTION, SOLUTION INTRAVENOUS at 09:02

## 2018-08-01 ENCOUNTER — ANESTHESIA EVENT (OUTPATIENT)
Dept: NON INVASIVE DIAGNOSTICS | Age: 68
End: 2018-08-01

## 2018-08-01 ENCOUNTER — ANESTHESIA (OUTPATIENT)
Dept: NON INVASIVE DIAGNOSTICS | Age: 68
End: 2018-08-01

## 2018-08-01 ENCOUNTER — HOSPITAL ENCOUNTER (OUTPATIENT)
Dept: NON INVASIVE DIAGNOSTICS | Age: 68
Discharge: HOME | End: 2018-08-02
Attending: INTERNAL MEDICINE | Admitting: INTERNAL MEDICINE
Payer: MEDICARE

## 2018-08-01 VITALS
RESPIRATION RATE: 12 BRPM | OXYGEN SATURATION: 100 % | SYSTOLIC BLOOD PRESSURE: 124 MMHG | TEMPERATURE: 97.5 F | DIASTOLIC BLOOD PRESSURE: 75 MMHG

## 2018-08-01 DIAGNOSIS — Z86.79 S/P RADIOFREQUENCY ABLATION OPERATION FOR ARRHYTHMIA: ICD-10-CM

## 2018-08-01 DIAGNOSIS — Z98.890 S/P RADIOFREQUENCY ABLATION OPERATION FOR ARRHYTHMIA: ICD-10-CM

## 2018-08-01 LAB
EKG ATRIAL RATE: 89 BPM
EKG P AXIS: 60 DEGREES
EKG P-R INTERVAL: 224 MS
EKG Q-T INTERVAL: 384 MS
EKG QRS DURATION: 90 MS
EKG QTC CALCULATION (BAZETT): 467 MS
EKG R AXIS: 32 DEGREES
EKG T AXIS: 53 DEGREES
EKG VENTRICULAR RATE: 89 BPM
METER GLUCOSE: 271 MG/DL (ref 70–110)
METER GLUCOSE: 297 MG/DL (ref 70–110)

## 2018-08-01 PROCEDURE — 93312 ECHO TRANSESOPHAGEAL: CPT

## 2018-08-01 PROCEDURE — 2580000003 HC RX 258: Performed by: INTERNAL MEDICINE

## 2018-08-01 PROCEDURE — 2580000003 HC RX 258: Performed by: NURSE ANESTHETIST, CERTIFIED REGISTERED

## 2018-08-01 PROCEDURE — 3700000001 HC ADD 15 MINUTES (ANESTHESIA)

## 2018-08-01 PROCEDURE — 3700000000 HC ANESTHESIA ATTENDED CARE

## 2018-08-01 PROCEDURE — 2580000003 HC RX 258

## 2018-08-01 PROCEDURE — 93010 ELECTROCARDIOGRAM REPORT: CPT | Performed by: INTERNAL MEDICINE

## 2018-08-01 PROCEDURE — 2500000003 HC RX 250 WO HCPCS

## 2018-08-01 PROCEDURE — 7100000001 HC PACU RECOVERY - ADDTL 15 MIN

## 2018-08-01 PROCEDURE — 76937 US GUIDE VASCULAR ACCESS: CPT | Performed by: INTERNAL MEDICINE

## 2018-08-01 PROCEDURE — 93657 TX L/R ATRIAL FIB ADDL: CPT | Performed by: INTERNAL MEDICINE

## 2018-08-01 PROCEDURE — 93005 ELECTROCARDIOGRAM TRACING: CPT | Performed by: INTERNAL MEDICINE

## 2018-08-01 PROCEDURE — C1894 INTRO/SHEATH, NON-LASER: HCPCS

## 2018-08-01 PROCEDURE — 93325 DOPPLER ECHO COLOR FLOW MAPG: CPT

## 2018-08-01 PROCEDURE — 82962 GLUCOSE BLOOD TEST: CPT

## 2018-08-01 PROCEDURE — 6360000002 HC RX W HCPCS

## 2018-08-01 PROCEDURE — 93656 COMPRE EP EVAL ABLTJ ATR FIB: CPT | Performed by: INTERNAL MEDICINE

## 2018-08-01 PROCEDURE — C1733 CATH, EP, OTHR THAN COOL-TIP: HCPCS

## 2018-08-01 PROCEDURE — 93662 INTRACARDIAC ECG (ICE): CPT | Performed by: INTERNAL MEDICINE

## 2018-08-01 PROCEDURE — 93613 INTRACARDIAC EPHYS 3D MAPG: CPT | Performed by: INTERNAL MEDICINE

## 2018-08-01 PROCEDURE — 7100000000 HC PACU RECOVERY - FIRST 15 MIN

## 2018-08-01 PROCEDURE — 2709999900 HC NON-CHARGEABLE SUPPLY

## 2018-08-01 PROCEDURE — C1759 CATH, INTRA ECHOCARDIOGRAPHY: HCPCS

## 2018-08-01 PROCEDURE — 6360000002 HC RX W HCPCS: Performed by: NURSE ANESTHETIST, CERTIFIED REGISTERED

## 2018-08-01 PROCEDURE — C1731 CATH, EP, 20 OR MORE ELEC: HCPCS

## 2018-08-01 PROCEDURE — 2720000010 HC SURG SUPPLY STERILE

## 2018-08-01 PROCEDURE — 93655 ICAR CATH ABLTJ DSCRT ARRHYT: CPT | Performed by: INTERNAL MEDICINE

## 2018-08-01 PROCEDURE — 93321 DOPPLER ECHO F-UP/LMTD STD: CPT

## 2018-08-01 PROCEDURE — C1769 GUIDE WIRE: HCPCS

## 2018-08-01 PROCEDURE — 2500000003 HC RX 250 WO HCPCS: Performed by: NURSE ANESTHETIST, CERTIFIED REGISTERED

## 2018-08-01 PROCEDURE — C1732 CATH, EP, DIAG/ABL, 3D/VECT: HCPCS

## 2018-08-01 PROCEDURE — C1730 CATH, EP, 19 OR FEW ELECT: HCPCS

## 2018-08-01 PROCEDURE — 6370000000 HC RX 637 (ALT 250 FOR IP): Performed by: INTERNAL MEDICINE

## 2018-08-01 RX ORDER — PROTAMINE SULFATE 10 MG/ML
INJECTION, SOLUTION INTRAVENOUS PRN
Status: DISCONTINUED | OUTPATIENT
Start: 2018-08-01 | End: 2018-08-01 | Stop reason: SDUPTHER

## 2018-08-01 RX ORDER — SODIUM CHLORIDE 9 MG/ML
INJECTION, SOLUTION INTRAVENOUS CONTINUOUS PRN
Status: DISCONTINUED | OUTPATIENT
Start: 2018-08-01 | End: 2018-08-01 | Stop reason: SDUPTHER

## 2018-08-01 RX ORDER — PHENYLEPHRINE HYDROCHLORIDE 10 MG/ML
INJECTION INTRAVENOUS PRN
Status: DISCONTINUED | OUTPATIENT
Start: 2018-08-01 | End: 2018-08-01 | Stop reason: SDUPTHER

## 2018-08-01 RX ORDER — GLIPIZIDE 5 MG/1
2.5 TABLET ORAL
Status: DISCONTINUED | OUTPATIENT
Start: 2018-08-02 | End: 2018-08-02 | Stop reason: HOSPADM

## 2018-08-01 RX ORDER — SODIUM CHLORIDE 9 MG/ML
INJECTION, SOLUTION INTRAVENOUS ONCE
Status: COMPLETED | OUTPATIENT
Start: 2018-08-01 | End: 2018-08-01

## 2018-08-01 RX ORDER — KETOROLAC TROMETHAMINE 30 MG/ML
15 INJECTION, SOLUTION INTRAMUSCULAR; INTRAVENOUS EVERY 6 HOURS
Status: DISPENSED | OUTPATIENT
Start: 2018-08-01 | End: 2018-08-02

## 2018-08-01 RX ORDER — MEPERIDINE HYDROCHLORIDE 50 MG/ML
12.5 INJECTION INTRAMUSCULAR; INTRAVENOUS; SUBCUTANEOUS EVERY 5 MIN PRN
Status: DISCONTINUED | OUTPATIENT
Start: 2018-08-01 | End: 2018-08-01

## 2018-08-01 RX ORDER — FUROSEMIDE 10 MG/ML
INJECTION INTRAMUSCULAR; INTRAVENOUS PRN
Status: DISCONTINUED | OUTPATIENT
Start: 2018-08-01 | End: 2018-08-01 | Stop reason: SDUPTHER

## 2018-08-01 RX ORDER — ATORVASTATIN CALCIUM 40 MG/1
80 TABLET, FILM COATED ORAL DAILY
Status: DISCONTINUED | OUTPATIENT
Start: 2018-08-01 | End: 2018-08-02 | Stop reason: HOSPADM

## 2018-08-01 RX ORDER — FENTANYL CITRATE 50 UG/ML
INJECTION, SOLUTION INTRAMUSCULAR; INTRAVENOUS PRN
Status: DISCONTINUED | OUTPATIENT
Start: 2018-08-01 | End: 2018-08-01 | Stop reason: SDUPTHER

## 2018-08-01 RX ORDER — HYDROCHLOROTHIAZIDE 25 MG/1
25 TABLET ORAL DAILY
Status: DISCONTINUED | OUTPATIENT
Start: 2018-08-01 | End: 2018-08-02 | Stop reason: HOSPADM

## 2018-08-01 RX ORDER — HEPARIN SODIUM 1000 [USP'U]/ML
INJECTION, SOLUTION INTRAVENOUS; SUBCUTANEOUS PRN
Status: DISCONTINUED | OUTPATIENT
Start: 2018-08-01 | End: 2018-08-01 | Stop reason: SDUPTHER

## 2018-08-01 RX ORDER — SODIUM CHLORIDE 0.9 % (FLUSH) 0.9 %
10 SYRINGE (ML) INJECTION EVERY 12 HOURS SCHEDULED
Status: DISCONTINUED | OUTPATIENT
Start: 2018-08-01 | End: 2018-08-02 | Stop reason: HOSPADM

## 2018-08-01 RX ORDER — METOPROLOL SUCCINATE 25 MG/1
25 TABLET, EXTENDED RELEASE ORAL DAILY
Status: DISCONTINUED | OUTPATIENT
Start: 2018-08-01 | End: 2018-08-02 | Stop reason: HOSPADM

## 2018-08-01 RX ORDER — SODIUM CHLORIDE 0.9 % (FLUSH) 0.9 %
10 SYRINGE (ML) INJECTION PRN
Status: DISCONTINUED | OUTPATIENT
Start: 2018-08-01 | End: 2018-08-02 | Stop reason: HOSPADM

## 2018-08-01 RX ORDER — MIDAZOLAM HYDROCHLORIDE 1 MG/ML
INJECTION INTRAMUSCULAR; INTRAVENOUS PRN
Status: DISCONTINUED | OUTPATIENT
Start: 2018-08-01 | End: 2018-08-01 | Stop reason: SDUPTHER

## 2018-08-01 RX ORDER — MORPHINE SULFATE 2 MG/ML
2 INJECTION, SOLUTION INTRAMUSCULAR; INTRAVENOUS EVERY 5 MIN PRN
Status: DISCONTINUED | OUTPATIENT
Start: 2018-08-01 | End: 2018-08-01

## 2018-08-01 RX ORDER — LISINOPRIL AND HYDROCHLOROTHIAZIDE 25; 20 MG/1; MG/1
1 TABLET ORAL DAILY
Status: DISCONTINUED | OUTPATIENT
Start: 2018-08-01 | End: 2018-08-01 | Stop reason: SDUPTHER

## 2018-08-01 RX ORDER — AZELASTINE 1 MG/ML
1 SPRAY, METERED NASAL 2 TIMES DAILY
Status: DISCONTINUED | OUTPATIENT
Start: 2018-08-01 | End: 2018-08-01 | Stop reason: CLARIF

## 2018-08-01 RX ORDER — ROCURONIUM BROMIDE 10 MG/ML
INJECTION, SOLUTION INTRAVENOUS PRN
Status: DISCONTINUED | OUTPATIENT
Start: 2018-08-01 | End: 2018-08-01 | Stop reason: SDUPTHER

## 2018-08-01 RX ORDER — LABETALOL HYDROCHLORIDE 5 MG/ML
5 INJECTION, SOLUTION INTRAVENOUS EVERY 10 MIN PRN
Status: DISCONTINUED | OUTPATIENT
Start: 2018-08-01 | End: 2018-08-01

## 2018-08-01 RX ORDER — SUCCINYLCHOLINE CHLORIDE 20 MG/ML
INJECTION INTRAMUSCULAR; INTRAVENOUS PRN
Status: DISCONTINUED | OUTPATIENT
Start: 2018-08-01 | End: 2018-08-01 | Stop reason: SDUPTHER

## 2018-08-01 RX ORDER — ALBUTEROL SULFATE 90 UG/1
2 AEROSOL, METERED RESPIRATORY (INHALATION) EVERY 6 HOURS PRN
Status: DISCONTINUED | OUTPATIENT
Start: 2018-08-01 | End: 2018-08-02 | Stop reason: HOSPADM

## 2018-08-01 RX ORDER — PROMETHAZINE HYDROCHLORIDE 25 MG/ML
6.25 INJECTION, SOLUTION INTRAMUSCULAR; INTRAVENOUS
Status: DISCONTINUED | OUTPATIENT
Start: 2018-08-01 | End: 2018-08-01

## 2018-08-01 RX ORDER — ACETAMINOPHEN 325 MG/1
650 TABLET ORAL EVERY 4 HOURS PRN
Status: DISCONTINUED | OUTPATIENT
Start: 2018-08-01 | End: 2018-08-02 | Stop reason: HOSPADM

## 2018-08-01 RX ORDER — PANTOPRAZOLE SODIUM 40 MG/1
40 TABLET, DELAYED RELEASE ORAL DAILY
Status: DISCONTINUED | OUTPATIENT
Start: 2018-08-02 | End: 2018-08-02 | Stop reason: HOSPADM

## 2018-08-01 RX ORDER — ONDANSETRON 2 MG/ML
INJECTION INTRAMUSCULAR; INTRAVENOUS PRN
Status: DISCONTINUED | OUTPATIENT
Start: 2018-08-01 | End: 2018-08-01 | Stop reason: SDUPTHER

## 2018-08-01 RX ORDER — LISINOPRIL 20 MG/1
20 TABLET ORAL DAILY
Status: DISCONTINUED | OUTPATIENT
Start: 2018-08-01 | End: 2018-08-02 | Stop reason: HOSPADM

## 2018-08-01 RX ORDER — PROPOFOL 10 MG/ML
INJECTION, EMULSION INTRAVENOUS PRN
Status: DISCONTINUED | OUTPATIENT
Start: 2018-08-01 | End: 2018-08-01 | Stop reason: SDUPTHER

## 2018-08-01 RX ORDER — DEXAMETHASONE SODIUM PHOSPHATE 10 MG/ML
INJECTION, SOLUTION INTRAMUSCULAR; INTRAVENOUS PRN
Status: DISCONTINUED | OUTPATIENT
Start: 2018-08-01 | End: 2018-08-01 | Stop reason: SDUPTHER

## 2018-08-01 RX ORDER — HYDRALAZINE HYDROCHLORIDE 20 MG/ML
5 INJECTION INTRAMUSCULAR; INTRAVENOUS EVERY 10 MIN PRN
Status: DISCONTINUED | OUTPATIENT
Start: 2018-08-01 | End: 2018-08-01

## 2018-08-01 RX ADMIN — PHENYLEPHRINE HYDROCHLORIDE 100 MCG: 10 INJECTION INTRAMUSCULAR; INTRAVENOUS; SUBCUTANEOUS at 08:20

## 2018-08-01 RX ADMIN — PROTAMINE SULFATE 65 MG: 10 INJECTION, SOLUTION INTRAVENOUS at 14:58

## 2018-08-01 RX ADMIN — SODIUM CHLORIDE: 9 INJECTION, SOLUTION INTRAVENOUS at 07:20

## 2018-08-01 RX ADMIN — Medication 180 MG: at 07:34

## 2018-08-01 RX ADMIN — HEPARIN SODIUM 3000 UNITS: 1000 INJECTION, SOLUTION INTRAVENOUS; SUBCUTANEOUS at 10:47

## 2018-08-01 RX ADMIN — SODIUM CHLORIDE: 9 INJECTION, SOLUTION INTRAVENOUS at 11:35

## 2018-08-01 RX ADMIN — HEPARIN SODIUM 18000 UNITS: 1000 INJECTION, SOLUTION INTRAVENOUS; SUBCUTANEOUS at 08:31

## 2018-08-01 RX ADMIN — ACETAMINOPHEN 650 MG: 325 TABLET, FILM COATED ORAL at 18:40

## 2018-08-01 RX ADMIN — FENTANYL CITRATE 50 MCG: 50 INJECTION, SOLUTION INTRAMUSCULAR; INTRAVENOUS at 07:34

## 2018-08-01 RX ADMIN — SODIUM CHLORIDE: 9 INJECTION, SOLUTION INTRAVENOUS at 08:40

## 2018-08-01 RX ADMIN — DEXAMETHASONE SODIUM PHOSPHATE 10 MG: 10 INJECTION, SOLUTION INTRAMUSCULAR; INTRAVENOUS at 07:36

## 2018-08-01 RX ADMIN — PHENYLEPHRINE HYDROCHLORIDE 100 MCG: 10 INJECTION INTRAMUSCULAR; INTRAVENOUS; SUBCUTANEOUS at 08:35

## 2018-08-01 RX ADMIN — PHENYLEPHRINE HYDROCHLORIDE 100 MCG: 10 INJECTION INTRAMUSCULAR; INTRAVENOUS; SUBCUTANEOUS at 12:00

## 2018-08-01 RX ADMIN — FENTANYL CITRATE 50 MCG: 50 INJECTION, SOLUTION INTRAMUSCULAR; INTRAVENOUS at 12:30

## 2018-08-01 RX ADMIN — HEPARIN SODIUM 6000 UNITS: 1000 INJECTION, SOLUTION INTRAVENOUS; SUBCUTANEOUS at 13:34

## 2018-08-01 RX ADMIN — LIDOCAINE HYDROCHLORIDE 100 MG: 20 INJECTION, SOLUTION INTRAVENOUS at 07:34

## 2018-08-01 RX ADMIN — ATORVASTATIN CALCIUM 80 MG: 40 TABLET, FILM COATED ORAL at 21:20

## 2018-08-01 RX ADMIN — MIDAZOLAM HYDROCHLORIDE 1 MG: 1 INJECTION, SOLUTION INTRAMUSCULAR; INTRAVENOUS at 08:20

## 2018-08-01 RX ADMIN — FENTANYL CITRATE 25 MCG: 50 INJECTION, SOLUTION INTRAMUSCULAR; INTRAVENOUS at 10:05

## 2018-08-01 RX ADMIN — PROPOFOL 200 MG: 10 INJECTION, EMULSION INTRAVENOUS at 07:34

## 2018-08-01 RX ADMIN — PHENYLEPHRINE HYDROCHLORIDE 100 MCG: 10 INJECTION INTRAMUSCULAR; INTRAVENOUS; SUBCUTANEOUS at 08:25

## 2018-08-01 RX ADMIN — SODIUM CHLORIDE: 9 INJECTION, SOLUTION INTRAVENOUS at 08:00

## 2018-08-01 RX ADMIN — MIDAZOLAM HYDROCHLORIDE 1 MG: 1 INJECTION, SOLUTION INTRAMUSCULAR; INTRAVENOUS at 07:25

## 2018-08-01 RX ADMIN — Medication 10 ML: at 21:26

## 2018-08-01 RX ADMIN — PHENYLEPHRINE HYDROCHLORIDE 5 MG: 10 INJECTION INTRAVENOUS at 12:50

## 2018-08-01 RX ADMIN — PHENYLEPHRINE HYDROCHLORIDE 50 MCG: 10 INJECTION INTRAMUSCULAR; INTRAVENOUS; SUBCUTANEOUS at 09:50

## 2018-08-01 RX ADMIN — FENTANYL CITRATE 100 MCG: 50 INJECTION, SOLUTION INTRAMUSCULAR; INTRAVENOUS at 07:50

## 2018-08-01 RX ADMIN — SODIUM CHLORIDE: 9 INJECTION, SOLUTION INTRAVENOUS at 12:50

## 2018-08-01 RX ADMIN — LISINOPRIL 20 MG: 20 TABLET ORAL at 19:31

## 2018-08-01 RX ADMIN — PHENYLEPHRINE HYDROCHLORIDE 50 MCG: 10 INJECTION INTRAMUSCULAR; INTRAVENOUS; SUBCUTANEOUS at 08:00

## 2018-08-01 RX ADMIN — ROCURONIUM BROMIDE 10 MG: 10 SOLUTION INTRAVENOUS at 07:34

## 2018-08-01 RX ADMIN — PROPOFOL 40 MG: 10 INJECTION, EMULSION INTRAVENOUS at 07:45

## 2018-08-01 RX ADMIN — FUROSEMIDE 20 MG: 10 INJECTION, SOLUTION INTRAVENOUS at 15:07

## 2018-08-01 RX ADMIN — APIXABAN 5 MG: 5 TABLET, FILM COATED ORAL at 21:20

## 2018-08-01 RX ADMIN — HEPARIN SODIUM 2000 UNITS: 1000 INJECTION, SOLUTION INTRAVENOUS; SUBCUTANEOUS at 09:38

## 2018-08-01 RX ADMIN — PHENYLEPHRINE HYDROCHLORIDE 100 MCG: 10 INJECTION INTRAMUSCULAR; INTRAVENOUS; SUBCUTANEOUS at 09:17

## 2018-08-01 RX ADMIN — PHENYLEPHRINE HYDROCHLORIDE 50 MCG: 10 INJECTION INTRAMUSCULAR; INTRAVENOUS; SUBCUTANEOUS at 08:05

## 2018-08-01 RX ADMIN — MIDAZOLAM HYDROCHLORIDE 1 MG: 1 INJECTION, SOLUTION INTRAMUSCULAR; INTRAVENOUS at 08:50

## 2018-08-01 RX ADMIN — ONDANSETRON 4 MG: 2 INJECTION, SOLUTION INTRAMUSCULAR; INTRAVENOUS at 14:49

## 2018-08-01 RX ADMIN — MIDAZOLAM HYDROCHLORIDE 1 MG: 1 INJECTION, SOLUTION INTRAMUSCULAR; INTRAVENOUS at 08:35

## 2018-08-01 RX ADMIN — HEPARIN SODIUM 2000 UNITS: 1000 INJECTION, SOLUTION INTRAVENOUS; SUBCUTANEOUS at 08:57

## 2018-08-01 RX ADMIN — SODIUM CHLORIDE: 9 INJECTION, SOLUTION INTRAVENOUS at 06:52

## 2018-08-01 RX ADMIN — PROPOFOL 110 MG: 10 INJECTION, EMULSION INTRAVENOUS at 08:50

## 2018-08-01 RX ADMIN — HEPARIN SODIUM 2000 UNITS: 1000 INJECTION, SOLUTION INTRAVENOUS; SUBCUTANEOUS at 12:03

## 2018-08-01 RX ADMIN — HYDROCHLOROTHIAZIDE 25 MG: 25 TABLET ORAL at 19:31

## 2018-08-01 RX ADMIN — PHENYLEPHRINE HYDROCHLORIDE 100 MCG: 10 INJECTION INTRAMUSCULAR; INTRAVENOUS; SUBCUTANEOUS at 09:04

## 2018-08-01 RX ADMIN — FENTANYL CITRATE 100 MCG: 50 INJECTION, SOLUTION INTRAMUSCULAR; INTRAVENOUS at 08:50

## 2018-08-01 RX ADMIN — HEPARIN SODIUM 2000 UNITS: 1000 INJECTION, SOLUTION INTRAVENOUS; SUBCUTANEOUS at 09:19

## 2018-08-01 RX ADMIN — MIDAZOLAM HYDROCHLORIDE 2 MG: 1 INJECTION, SOLUTION INTRAMUSCULAR; INTRAVENOUS at 07:20

## 2018-08-01 RX ADMIN — LINAGLIPTIN 5 MG: 5 TABLET, FILM COATED ORAL at 21:20

## 2018-08-01 RX ADMIN — PHENYLEPHRINE HYDROCHLORIDE 100 MCG: 10 INJECTION INTRAMUSCULAR; INTRAVENOUS; SUBCUTANEOUS at 08:10

## 2018-08-01 RX ADMIN — PHENYLEPHRINE HYDROCHLORIDE 100 MCG: 10 INJECTION INTRAMUSCULAR; INTRAVENOUS; SUBCUTANEOUS at 11:00

## 2018-08-01 RX ADMIN — HEPARIN SODIUM 2000 UNITS: 1000 INJECTION, SOLUTION INTRAVENOUS; SUBCUTANEOUS at 10:25

## 2018-08-01 ASSESSMENT — PULMONARY FUNCTION TESTS
PIF_VALUE: 22
PIF_VALUE: 0
PIF_VALUE: 22
PIF_VALUE: 22
PIF_VALUE: 21
PIF_VALUE: 22
PIF_VALUE: 20
PIF_VALUE: 22
PIF_VALUE: 21
PIF_VALUE: 22
PIF_VALUE: 20
PIF_VALUE: 22
PIF_VALUE: 22
PIF_VALUE: 21
PIF_VALUE: 18
PIF_VALUE: 20
PIF_VALUE: 22
PIF_VALUE: 20
PIF_VALUE: 22
PIF_VALUE: 21
PIF_VALUE: 21
PIF_VALUE: 23
PIF_VALUE: 21
PIF_VALUE: 23
PIF_VALUE: 21
PIF_VALUE: 20
PIF_VALUE: 21
PIF_VALUE: 21
PIF_VALUE: 22
PIF_VALUE: 20
PIF_VALUE: 22
PIF_VALUE: 21
PIF_VALUE: 21
PIF_VALUE: 23
PIF_VALUE: 22
PIF_VALUE: 22
PIF_VALUE: 20
PIF_VALUE: 19
PIF_VALUE: 22
PIF_VALUE: 22
PIF_VALUE: 21
PIF_VALUE: 21
PIF_VALUE: 23
PIF_VALUE: 22
PIF_VALUE: 21
PIF_VALUE: 20
PIF_VALUE: 22
PIF_VALUE: 21
PIF_VALUE: 22
PIF_VALUE: 19
PIF_VALUE: 22
PIF_VALUE: 21
PIF_VALUE: 22
PIF_VALUE: 19
PIF_VALUE: 20
PIF_VALUE: 22
PIF_VALUE: 21
PIF_VALUE: 20
PIF_VALUE: 21
PIF_VALUE: 20
PIF_VALUE: 22
PIF_VALUE: 4
PIF_VALUE: 22
PIF_VALUE: 20
PIF_VALUE: 21
PIF_VALUE: 22
PIF_VALUE: 19
PIF_VALUE: 22
PIF_VALUE: 23
PIF_VALUE: 21
PIF_VALUE: 23
PIF_VALUE: 20
PIF_VALUE: 21
PIF_VALUE: 23
PIF_VALUE: 22
PIF_VALUE: 22
PIF_VALUE: 23
PIF_VALUE: 3
PIF_VALUE: 23
PIF_VALUE: 22
PIF_VALUE: 19
PIF_VALUE: 21
PIF_VALUE: 22
PIF_VALUE: 19
PIF_VALUE: 22
PIF_VALUE: 22
PIF_VALUE: 21
PIF_VALUE: 22
PIF_VALUE: 23
PIF_VALUE: 1
PIF_VALUE: 21
PIF_VALUE: 23
PIF_VALUE: 22
PIF_VALUE: 22
PIF_VALUE: 19
PIF_VALUE: 22
PIF_VALUE: 23
PIF_VALUE: 21
PIF_VALUE: 20
PIF_VALUE: 21
PIF_VALUE: 22
PIF_VALUE: 21
PIF_VALUE: 22
PIF_VALUE: 21
PIF_VALUE: 22
PIF_VALUE: 21
PIF_VALUE: 23
PIF_VALUE: 22
PIF_VALUE: 23
PIF_VALUE: 22
PIF_VALUE: 23
PIF_VALUE: 22
PIF_VALUE: 3
PIF_VALUE: 22
PIF_VALUE: 23
PIF_VALUE: 22
PIF_VALUE: 21
PIF_VALUE: 22
PIF_VALUE: 23
PIF_VALUE: 22
PIF_VALUE: 22
PIF_VALUE: 47
PIF_VALUE: 22
PIF_VALUE: 0
PIF_VALUE: 22
PIF_VALUE: 22
PIF_VALUE: 23
PIF_VALUE: 21
PIF_VALUE: 21
PIF_VALUE: 22
PIF_VALUE: 23
PIF_VALUE: 22
PIF_VALUE: 19
PIF_VALUE: 21
PIF_VALUE: 23
PIF_VALUE: 22
PIF_VALUE: 21
PIF_VALUE: 21
PIF_VALUE: 23
PIF_VALUE: 22
PIF_VALUE: 22
PIF_VALUE: 21
PIF_VALUE: 20
PIF_VALUE: 23
PIF_VALUE: 4
PIF_VALUE: 22
PIF_VALUE: 4
PIF_VALUE: 23
PIF_VALUE: 19
PIF_VALUE: 22
PIF_VALUE: 20
PIF_VALUE: 22
PIF_VALUE: 39
PIF_VALUE: 19
PIF_VALUE: 22
PIF_VALUE: 18
PIF_VALUE: 22
PIF_VALUE: 21
PIF_VALUE: 21
PIF_VALUE: 23
PIF_VALUE: 21
PIF_VALUE: 23
PIF_VALUE: 22
PIF_VALUE: 23
PIF_VALUE: 22
PIF_VALUE: 21
PIF_VALUE: 21
PIF_VALUE: 22
PIF_VALUE: 23
PIF_VALUE: 22
PIF_VALUE: 21
PIF_VALUE: 22
PIF_VALUE: 22
PIF_VALUE: 21
PIF_VALUE: 22
PIF_VALUE: 18
PIF_VALUE: 22
PIF_VALUE: 22
PIF_VALUE: 23
PIF_VALUE: 19
PIF_VALUE: 22
PIF_VALUE: 21
PIF_VALUE: 23
PIF_VALUE: 23
PIF_VALUE: 20
PIF_VALUE: 20
PIF_VALUE: 22
PIF_VALUE: 22
PIF_VALUE: 23
PIF_VALUE: 23
PIF_VALUE: 22
PIF_VALUE: 23
PIF_VALUE: 21
PIF_VALUE: 22
PIF_VALUE: 21
PIF_VALUE: 22
PIF_VALUE: 23
PIF_VALUE: 22
PIF_VALUE: 21
PIF_VALUE: 22
PIF_VALUE: 21
PIF_VALUE: 21
PIF_VALUE: 22
PIF_VALUE: 21
PIF_VALUE: 20
PIF_VALUE: 21
PIF_VALUE: 21
PIF_VALUE: 22
PIF_VALUE: 22
PIF_VALUE: 2
PIF_VALUE: 22
PIF_VALUE: 21
PIF_VALUE: 20
PIF_VALUE: 21
PIF_VALUE: 21
PIF_VALUE: 22
PIF_VALUE: 1
PIF_VALUE: 22
PIF_VALUE: 20
PIF_VALUE: 20
PIF_VALUE: 21
PIF_VALUE: 23
PIF_VALUE: 21
PIF_VALUE: 22
PIF_VALUE: 22
PIF_VALUE: 20
PIF_VALUE: 21
PIF_VALUE: 22
PIF_VALUE: 21
PIF_VALUE: 22
PIF_VALUE: 22
PIF_VALUE: 20
PIF_VALUE: 22
PIF_VALUE: 23
PIF_VALUE: 22
PIF_VALUE: 21
PIF_VALUE: 22
PIF_VALUE: 2
PIF_VALUE: 20
PIF_VALUE: 22
PIF_VALUE: 23
PIF_VALUE: 22
PIF_VALUE: 0
PIF_VALUE: 21
PIF_VALUE: 21
PIF_VALUE: 20
PIF_VALUE: 21
PIF_VALUE: 22
PIF_VALUE: 22
PIF_VALUE: 20
PIF_VALUE: 22
PIF_VALUE: 21
PIF_VALUE: 23
PIF_VALUE: 22
PIF_VALUE: 22
PIF_VALUE: 19
PIF_VALUE: 22
PIF_VALUE: 22
PIF_VALUE: 21
PIF_VALUE: 19
PIF_VALUE: 22
PIF_VALUE: 22
PIF_VALUE: 0
PIF_VALUE: 22
PIF_VALUE: 19
PIF_VALUE: 20
PIF_VALUE: 23
PIF_VALUE: 23
PIF_VALUE: 21
PIF_VALUE: 22
PIF_VALUE: 22
PIF_VALUE: 18
PIF_VALUE: 22
PIF_VALUE: 23
PIF_VALUE: 0
PIF_VALUE: 21
PIF_VALUE: 22
PIF_VALUE: 20
PIF_VALUE: 22
PIF_VALUE: 19
PIF_VALUE: 21
PIF_VALUE: 21
PIF_VALUE: 22
PIF_VALUE: 22
PIF_VALUE: 20
PIF_VALUE: 20
PIF_VALUE: 22
PIF_VALUE: 22
PIF_VALUE: 23
PIF_VALUE: 0
PIF_VALUE: 21
PIF_VALUE: 22
PIF_VALUE: 21
PIF_VALUE: 22
PIF_VALUE: 21
PIF_VALUE: 22
PIF_VALUE: 17
PIF_VALUE: 21
PIF_VALUE: 22
PIF_VALUE: 21
PIF_VALUE: 24
PIF_VALUE: 22
PIF_VALUE: 21
PIF_VALUE: 22
PIF_VALUE: 22
PIF_VALUE: 23
PIF_VALUE: 21
PIF_VALUE: 0
PIF_VALUE: 22
PIF_VALUE: 20
PIF_VALUE: 21
PIF_VALUE: 0
PIF_VALUE: 22
PIF_VALUE: 21
PIF_VALUE: 20
PIF_VALUE: 0
PIF_VALUE: 20
PIF_VALUE: 22
PIF_VALUE: 21
PIF_VALUE: 22
PIF_VALUE: 22
PIF_VALUE: 21
PIF_VALUE: 22
PIF_VALUE: 22
PIF_VALUE: 21
PIF_VALUE: 18
PIF_VALUE: 22
PIF_VALUE: 22
PIF_VALUE: 23
PIF_VALUE: 22
PIF_VALUE: 20
PIF_VALUE: 21
PIF_VALUE: 21
PIF_VALUE: 19
PIF_VALUE: 21
PIF_VALUE: 19
PIF_VALUE: 22
PIF_VALUE: 22
PIF_VALUE: 21
PIF_VALUE: 21
PIF_VALUE: 23
PIF_VALUE: 0
PIF_VALUE: 21
PIF_VALUE: 22
PIF_VALUE: 23
PIF_VALUE: 21
PIF_VALUE: 20
PIF_VALUE: 23
PIF_VALUE: 22
PIF_VALUE: 22
PIF_VALUE: 20
PIF_VALUE: 2
PIF_VALUE: 23
PIF_VALUE: 22
PIF_VALUE: 19
PIF_VALUE: 22
PIF_VALUE: 22
PIF_VALUE: 20
PIF_VALUE: 23
PIF_VALUE: 22
PIF_VALUE: 41
PIF_VALUE: 22
PIF_VALUE: 22
PIF_VALUE: 23
PIF_VALUE: 19
PIF_VALUE: 20
PIF_VALUE: 22
PIF_VALUE: 22
PIF_VALUE: 21
PIF_VALUE: 22
PIF_VALUE: 23
PIF_VALUE: 22
PIF_VALUE: 23
PIF_VALUE: 23
PIF_VALUE: 22
PIF_VALUE: 23
PIF_VALUE: 22
PIF_VALUE: 23
PIF_VALUE: 21
PIF_VALUE: 22
PIF_VALUE: 0
PIF_VALUE: 22
PIF_VALUE: 3
PIF_VALUE: 22
PIF_VALUE: 1
PIF_VALUE: 21
PIF_VALUE: 23
PIF_VALUE: 5
PIF_VALUE: 22
PIF_VALUE: 21
PIF_VALUE: 22
PIF_VALUE: 23
PIF_VALUE: 0
PIF_VALUE: 20
PIF_VALUE: 1
PIF_VALUE: 22
PIF_VALUE: 23
PIF_VALUE: 22
PIF_VALUE: 22
PIF_VALUE: 1
PIF_VALUE: 20
PIF_VALUE: 21
PIF_VALUE: 18
PIF_VALUE: 22
PIF_VALUE: 22
PIF_VALUE: 23
PIF_VALUE: 22
PIF_VALUE: 23
PIF_VALUE: 22
PIF_VALUE: 21
PIF_VALUE: 23
PIF_VALUE: 22
PIF_VALUE: 22
PIF_VALUE: 1
PIF_VALUE: 22

## 2018-08-01 ASSESSMENT — PAIN SCALES - GENERAL
PAINLEVEL_OUTOF10: 0
PAINLEVEL_OUTOF10: 2
PAINLEVEL_OUTOF10: 0

## 2018-08-01 ASSESSMENT — PAIN DESCRIPTION - FREQUENCY: FREQUENCY: INTERMITTENT

## 2018-08-01 ASSESSMENT — COPD QUESTIONNAIRES: CAT_SEVERITY: MODERATE

## 2018-08-01 ASSESSMENT — ENCOUNTER SYMPTOMS: SHORTNESS OF BREATH: 1

## 2018-08-01 ASSESSMENT — PAIN DESCRIPTION - LOCATION: LOCATION: GENERALIZED

## 2018-08-01 ASSESSMENT — PAIN DESCRIPTION - PAIN TYPE: TYPE: ACUTE PAIN;CHRONIC PAIN

## 2018-08-01 ASSESSMENT — PAIN DESCRIPTION - DESCRIPTORS: DESCRIPTORS: ACHING

## 2018-08-01 NOTE — OP NOTE
[address]  [place] [address] Electrophysiology  Procedure Report  Patient: Anders Rain  Medical Record Number: [id number]  Date of Procedure:  8/1/2018  Operating Electrophysiologist: Radha Lemus MD, [facility]  Referring Physician: Carlo Humphrey MD     Procedure(s) Performed:  Radiofrequency Ablation of Atrial Fibrillation:  1. Atrial Fibrillation Ablation- Pulmonary Vein Isolation (81655)   2. Atrial Flutter Ablation (73192)  3. Atrial Fibrillation Ablation- non-pulmonary vein isolation substrate ablation (59188)  4. Intracardiac Echocardiography (23276-02)   5. 3-dimensional intracardiac mapping (52150)   6. Vascular ultrasound for venous access (04655-85)  7. Transesophageal Echocardiography (49241-93) w/Color Doppler (92794-42) and pulsed/continuous wave Doppler (30158-39)    Indication for the Procedure:  1. Symptomatic, drug-refractory persistent atrial fibrillation    Brief Clinical History:   The patient is a 80 y.o. male with symptomatic, drug-refractory persistent atrial fibrillation who presents today for elective radiofrequency atrial fibrillation catheter ablation procedure. ANESTHESIOLOGIST/CRNA: Crystal Marley CRNA    MEDICATIONS:  1. Heparin gtt (per nursing notes)    CONTRAST:   N/A    ESTIMATED BLOOD LOSS: Approximately 100 mL including ACTs    COMPLICATIONS:  None immediately apparent    FLUOROSCOPY TIME: 68 minutes. MAXIMUM ESOPHAGEAL TEMPERATURE: 36.6 degrees Celsius    DESCRIPTION OF PROCEDURE: The patient came to the cardiac electrophysiology laboratory in a fasting and nonsedated state. Informed consent was obtained in advance. The patient was moved to the laboratory table and prepped and draped in the usual sterile fashion for electrophysiology study and radiofrequency catheter ablation. The patient was endotracheally intubated and placed under general anesthesia.  Transesophageal echocardiography was performed to exclude the presence of left atrial appendage thrombus. However the LA appendage was poorly visualized and thus we re-reviewed the CT which noted no DANIELE thrombus. The skin and fascia overlying the right and left femoral veins were infiltrated with 2% lidocaine using a 25-gauge needle. We utilized vascular ultrasound to document venous patency and to allow for direct visualization of vascular needle entry with permanent recording using the [employer] system. Using the modified Seldinger technique, the right femoral vein was accessed x 2, and the left femoral vein x 2, respectively using an 18-gauge Cook needle and J-tip guidewires. Two 8-Fr sheaths were introduced to the right femoral vein, and one 7-Fr and one 9-Fr sidearm sheath were introduced into the left femoral vein. A 18,000 unit bolus of heparin was given at this time. An ACT was checked intermittently and additional boluses were given as well as adjustments to the peripheral heparin infusion in order to maintain an ACT between 350 and 400 seconds. Through the 9-Fr and 7-Fr, a 9-Fr St. Mart ViewFlex 4-way intracardiac echocardiography catheter and a Bard 6-Fr dynamic decapolar catheter were introduced and advanced to the right atrium under fluoroscopic guidance. The Bard catheter was advanced to the coronary sinus. The 8-Fr sheath was exchanged over an exchange length guidewire for a St. Mart fast cath SL 1 transseptal sheath and dilator apparatus were advanced over a long J-tipped guidewire to the SVC. After aspirating and flushing the transseptal sheath, a 71 cm BRK transseptal needle and obturator were introduced through the transseptal sheath. The obturator was removed and a continuous pressure line was connected to the needle. The transseptal needle was continuously flushed as the apparatus was withdrawn under fluoroscopic guidance to the fossa ovalis, and a damped pressure waveform was confirmed.  Using intracardiac echocargraphy, tenting of the interatrial septum by the transseptal needle was confirmed. Using pressure monitoring, fluoroscopy, and intracardiac echography, transseptal puncture was performed and left atrial pressure was recorded. The SL1 sheath was easily crossed into the left atrium. The sheath was carefully aspirated and flushed with heparinized saline. Mean LA pressure was approximately 12mmHg. The same technique was used to perform the 2nd trans-septal puncture exchanging the other 8-Fr sheath for the SL-1 sheath. Please see above for details. Next, 3D intracardiac mapping was performed using the [employer]  system and a 12-pole circular mapping catheter (Advisor spiral) and ablation catheter (open irrigated Tacti-cath contact-force 75mm). Left atrial and pulmonary vein geometry were created and compared to the CT images. Baseline pulmonary vein electrograms were stored. One of the SL-1 sheaths was exchanged over a Toray guidewire for a medium curl Agilis deflectable sheath. The ablation catheter was placed within the Agilis sheath. Pulmonary vein isolation was then undertaken by performing wide antral circumferential ablation (WACA) around all 4 PVs uncluding the harika between the veins as well. We utilized 20-25W on the posterior wall and 30-35W on the anterior wall with a 42 degree temp limit, and lesion with goal CF of 10-20g. We utilized a combination of time, FTI, and signal changes to guide duration of each lesion. Esophageal temperature monitoring was monitored throughout the procedure. The angle of the RSPV and RIPV was unusual and thus block was unable to be completely achieved. Following this due to atrial fibrillation which persisted AFTER pulmonary vein isolation we decided to perform additional substrate based ablation. We performed a roof line between the LSPV and RSPV. Cardioversion was performed after all additional substrate ablation and baseline EP testing was performed.     A 200J cardioversion shock was performed which converted AFib to atrial flutter. The atrial flutter was at 230-240ms with activation of CS 9/10->CS 1/2. Entrainment from CS 7/8 showed PPI-TCL of 55ms  Entrainment from CS 3/4 showed PPI-TCL of 70ms  I removed the ablation catheter from the Agilis sheath and removed the ICE catheter. The ablation catheter was placed in the RA at the cavo-tricuspid isthmus. Entrainment from the mid CTI isthmus whosed PPI-TCL of 5ms, thus this showed that this appeared to be CTI dependent RA flutter. A 2nd cardioversion was performed at 360J which converted to sinus rhythm. The ablation catheter was placed back in the [address] Repeat testing showed that the LIPV had bidirectional block. Additional RF ablation was performed around the LSPV and bidirectional block was confirmed. Due to prolonged procedure time we decided to proceed with CTI ablation. The ablation and circular mapping catheters and their sheaths were pulled back from the LA->RA. The circular mapping catheter was swapped out for a duo-deca Livewire mapping catheter which was draped along the lateral wall of the RA. Using the irrigate ablation catheter used for our RF ablation (Durenda Mili), we created a 3-D electroanatomic map of the right atrium and cavo-tricuspid isthmus. We tagged the His-bundle on our map. Using the [employer] 3-D electroanatomic mapping system, a total of 10 RF lesions were created throughout the study. Settings were a temperature of 42 degree cut-off, power output of 30 esquivel, and duration of  sec which was performed in a contiguous drag lesion set. Pre ablation the TICT was ~104ms, post ablation the TICT was ~150ms bidirectionally and the conduction pattern pacing lateral and septal confirmed bidirectional block. FINAL ELECTROPHYSIOLOGIC DATA: The sinus cycle length was 940 ms, the AH interval was 95 ms, and the HV interval was 63.  The AV Wenckebach cycle length was 450 ms, the AV

## 2018-08-01 NOTE — PROGRESS NOTES
Removed figure 8 sutures and cock stop from lamar puncture sites. No bleeding or hematoma. Tolerated well.

## 2018-08-01 NOTE — PLAN OF CARE
Problem: Infection - Surgical Site:  Goal: Will show no infection signs and symptoms  Will show no infection signs and symptoms   Outcome: Met This Shift      Problem: Bleeding:  Goal: Will show no signs and symptoms of excessive bleeding  Will show no signs and symptoms of excessive bleeding   Outcome: Met This Shift      Problem: Cardiac:  Goal: Ability to maintain an adequate cardiac output will improve  Ability to maintain an adequate cardiac output will improve   Outcome: Met This Shift      Problem: Safety:  Goal: Will show no signs and symptoms of excessive bleeding  Will show no signs and symptoms of excessive bleeding   Outcome: Met This Shift

## 2018-08-01 NOTE — ANESTHESIA PRE PROCEDURE
Department of Anesthesiology  Preprocedure Note       Name:  Rico Valerio   Age:  80 y.o.  :  1950                                          MRN:  [id number]         Date:  2018      Surgeon: Candis Banerjee    Procedure: AFib Ablation    Medications prior to admission:   Prior to Admission medications    Medication Sig Start Date End Date Taking? Authorizing Provider   glipiZIDE (GLUCOTROL XL) 2.5 MG extended release tablet Take 1 tablet by mouth daily 18  Yes Celine Avelar MD   apixaban (ELIQUIS) 5 MG TABS tablet Take 1 tablet by mouth 2 times daily 18  Yes Celine Avelar MD   fluticasone-vilanterol (BREO ELLIPTA) 100-25 MCG/INH AEPB inhaler Inhale 1 puff into the lungs daily 18  Yes Celine Avelar MD   azelastine (ASTELIN) 0.1 % nasal spray 1 spray by Nasal route 2 times daily Use in each nostril as directed 18  Yes Celine Avelar MD   albuterol sulfate HFA (PROAIR HFA) 108 (90 Base) MCG/ACT inhaler Inhale 2 puffs into the lungs every 6 hours as needed for Wheezing or Shortness of Breath 5/15/18  Yes Celine Avelar MD   esomeprazole Magnesium (NEXIUM) 20 MG PACK Take 20 mg by mouth daily Instructed to take morning of surgery with a sip of water   Yes Historical Provider, MD   SITagliptin (JANUVIA) 100 MG tablet Take 1 tablet by mouth every morning (before breakfast) 18  Yes Celine Avelar MD   metoprolol succinate (TOPROL XL) 25 MG extended release tablet Take 1 tablet by mouth daily 18  Yes Celine Avelar MD   Blood Glucose Monitoring Suppl (ONE TOUCH ULTRA 2) w/Device KIT  17  Yes Historical Provider, MD Rei Pete [facility] [id number]  17  Yes Historical Provider, MD   glucose monitoring kit (FREESTYLE) monitoring kit Use once. 17  Yes Celine Avelar MD   glucose blood VI test strips (FREESTYLE TEST STRIPS) strip 1 each by In Vitro route daily As needed.  17  Yes Celine Avelar MD   glucose blood VI test strips (TRUETRACK TEST) strip 1 each by Other route daily As needed. 12/19/16  Yes Lore Escoto MD   SM LANCETS 21G MISC use as directed 5/7/14  Yes Lore Escoto MD   lisinopril-hydrochlorothiazide BAPTISTE FND [facility]) 20-25 MG per tablet Take 1 tablet by mouth daily 6/19/18   Lore Escoto MD   atorvastatin (LIPITOR) 80 MG tablet Take 1 tablet by mouth daily 4/26/18   Lore Escoto MD       Current medications:    Current Outpatient Prescriptions   Medication Sig Dispense Refill    glipiZIDE (GLUCOTROL XL) 2.5 MG extended release tablet Take 1 tablet by mouth daily 30 tablet 5    apixaban (ELIQUIS) 5 MG TABS tablet Take 1 tablet by mouth 2 times daily 60 tablet 5    fluticasone-vilanterol (BREO ELLIPTA) 100-25 MCG/INH AEPB inhaler Inhale 1 puff into the lungs daily 1 each 5    azelastine (ASTELIN) 0.1 % nasal spray 1 spray by Nasal route 2 times daily Use in each nostril as directed 1 Bottle 5    albuterol sulfate HFA (PROAIR HFA) 108 (90 Base) MCG/ACT inhaler Inhale 2 puffs into the lungs every 6 hours as needed for Wheezing or Shortness of Breath 1 Inhaler 5    esomeprazole Magnesium (NEXIUM) 20 MG PACK Take 20 mg by mouth daily Instructed to take morning of surgery with a sip of water      SITagliptin (JANUVIA) 100 MG tablet Take 1 tablet by mouth every morning (before breakfast) 30 tablet 5    metoprolol succinate (TOPROL XL) 25 MG extended release tablet Take 1 tablet by mouth daily 30 tablet 3    Blood Glucose Monitoring Suppl (ONE TOUCH ULTRA 2) w/Device KIT       ONETOUCH DELICA LANCETS 63A MISC       glucose monitoring kit (FREESTYLE) monitoring kit Use once. 1 kit 0    glucose blood VI test strips (FREESTYLE TEST STRIPS) strip 1 each by In Vitro route daily As needed. 50 each 5    glucose blood VI test strips (TRUETRACK TEST) strip 1 each by Other route daily As needed.  100 each 10    SM LANCETS 21G MISC use as directed 100 each 10    lisinopril-hydrochlorothiazide (PRINZIDE;ZESTORETIC) 20-25 MG per tablet Take 1 tablet by mouth daily 90 tablet 3    atorvastatin (LIPITOR) 80 MG tablet Take 1 tablet by mouth daily 90 tablet 3     Current Facility-Administered Medications   Medication Dose Route Frequency Provider Last Rate Last Dose    promethazine (PHENERGAN) injection 6.25 mg  6.25 mg Intravenous Once PRN Jane Pacheco MD        labetalol (NORMODYNE;TRANDATE) injection 5 mg  5 mg Intravenous Q10 Min PRN Jane Pacheco MD        hydrALAZINE (APRESOLINE) injection 5 mg  5 mg Intravenous Q10 Min PRN Jane Terrazas MD        meperidine (DEMEROL) injection 12.5 mg  12.5 mg Intravenous Q5 Min PRN Jane Pacheco MD        morphine injection 2 mg  2 mg Intravenous Q5 Min PRN Jane Pacheco MD           Allergies:  No Known Allergies    Problem List:    Patient Active Problem List   Diagnosis Code    Diabetes type 2, controlled ([facility] E11.9    Allergic rhinitis J30.9    Hyperlipidemia E78.5    Essential hypertension I10    GERD (gastroesophageal reflux disease) K21.9    Diverticulosis K57.90    Cholelithiasis K80.20    Erectile dysfunction N52.9    Fatigue R53.83    Persistent atrial fibrillation (HCC) I48.1    Sleep-disordered breathing G47.30    Class 1 obesity due to excess calories with serious comorbidity and body mass index (BMI) of 32.0 to 32.9 in adult E66.09, Z68.32       Past Medical History:        Diagnosis Date    A-fib [facility])     follows with Dr. Anaid Perez Allergic rhinitis     Cholelithiasis     noted on 3/11 CT    [facility]     early stages    Diabetes type 2, controlled ([facility]     Diverticulosis     noted on 3/11 CT    GERD (gastroesophageal reflux disease)     Hyperlipidemia     Hypertension        Past Surgical History:        Procedure Laterality Date    APPENDECTOMY      COLONOSCOPY         Social History:    Social History   Substance Use Topics    Smoking status: Former Smoker     Packs/day: 1.50     Years: 15.00     Types: Cigarettes     Quit date: 4/5/1997    Smokeless tobacco: Never Used    Alcohol use Yes      Comment: moderate alcohol use / drinks 12 beers or shots liquor per week                                Counseling given: Not Answered      Vital Signs (Current):   Vitals:    08/01/18 0647   BP: (!) 141/88   Pulse: 82   Temp: 36.4 °C (97.5 °F)   Weight: 216 lb (98 kg)   Height: 5' 9\" (1.753 m)                                              BP Readings from Last 3 Encounters:   08/01/18 (!) 141/88   07/27/18 134/65   07/24/18 99/62       NPO Status: Time of last liquid consumption: 2000                        Time of last solid consumption: 2000                        Date of last liquid consumption: 07/03/18                        Date of last solid food consumption: 07/31/18    BMI:   Wt Readings from Last 3 Encounters:   08/01/18 216 lb (98 kg)   07/27/18 216 lb (98 kg)   07/24/18 220 lb (99.8 kg)     Body mass index is 31.9 kg/m². CBC:   Lab Results   Component Value Date    WBC 7.8 07/27/2018    RBC 4.89 07/27/2018    HGB 14.5 07/27/2018    HCT 42.3 07/27/2018    MCV 86.5 07/27/2018    RDW 12.7 07/27/2018     07/27/2018       CMP:   Lab Results   Component Value Date     07/27/2018    K 4.4 07/27/2018    CL 94 07/27/2018    CO2 30 07/27/2018    BUN 18 07/27/2018    CREATININE 1.2 07/27/2018    GFRAA >60 07/27/2018    LABGLOM >60 07/27/2018    GLUCOSE 196 07/27/2018    GLUCOSE 107 01/03/2012    PROT 8.4 04/18/2018    CALCIUM 9.8 07/27/2018    BILITOT 0.7 04/18/2018    ALKPHOS 47 04/18/2018    AST 15 04/18/2018    ALT 21 04/18/2018       POC Tests: No results for input(s): POCGLU, POCNA, POCK, POCCL, POCBUN, POCHEMO, POCHCT in the last 72 hours.     Coags:   Lab Results   Component Value Date    PROTIME 17.1 07/27/2018    INR 1.5 07/27/2018    APTT 33.5 07/27/2018       HCG (If Applicable): No results found for: PREGTESTUR, PREGSERUM, HCG, HCGQUANT     ABGs: No results found for: PHART, PO2ART, NHD2IDL, VFI6EIS, BEART, S3KHSGRY     Type & Screen (If Applicable):  No results found for: LABABO, [address]    Anesthesia Evaluation  Patient summary reviewed and Nursing notes reviewed no history of anesthetic complications:   Airway: Mallampati: II  TM distance: <3 FB   Neck ROM: full  Mouth opening: > = 3 FB Dental:          Pulmonary: breath sounds clear to auscultation  (+) COPD:  shortness of breath: recurrent,      (-) sleep apnea (Patient had a negative sleep study)                          ROS comment: Former Smoker, Quit 25 years ago. Cardiovascular:    (+) hypertension:, dysrhythmias: atrial fibrillation, hyperlipidemia      ECG reviewed  Rhythm: irregular  Rate: normal  Echocardiogram reviewed  Stress test reviewed  Cleared by cardiology     Beta Blocker:  Dose within 24 Hrs         Neuro/Psych:   (+) depression/anxiety  (Situational Anxiety)            GI/Hepatic/Renal:   (+) GERD (Patient took Nexium this AM): well controlled,          ROS comment: Diverticulosis  Cholelithiasis. Endo/Other:    (+) Diabetes ()Type II DM, well controlled, , blood dyscrasia (Last dose of Eliquis taken 7/31/18): anticoagulation therapy:., .                 Abdominal:           Vascular: negative vascular ROS.                                   ECHO 5/17/18   Findings      Left Ventricle   Normal left ventricle size.   Normal left ventricle wall thickness.   No wall motion abnormalities.   Normal diastolic function.   Ejection fraction is visually estimated at 55-65%.      Right Ventricle   Normal right ventricular size and function.      Left Atrium   Left atrium is of normal size.   Atrial fibrillation      Right Atrium   Normal right atrium size.      Mitral Valve   Normal mitral valve structure and function.   Physiologic and/or trace mitral regurgitation is present.      Tricuspid Valve   Normal tricuspid valve structure and function.      Aortic Valve   Aortic valve opens well.   The aortic valve is

## 2018-08-01 NOTE — ANESTHESIA PRE PROCEDURE
Department of Anesthesiology  Preprocedure Note       Name:  Rajan Greenberg   Age:  80 y.o.  :  1950                                          MRN:  [id number]         Date:  2018      Surgeon: Mandy Owens    Procedure: A fib Ablation    Medications prior to admission:   Prior to Admission medications    Medication Sig Start Date End Date Taking? Authorizing Provider   glipiZIDE (GLUCOTROL XL) 2.5 MG extended release tablet Take 1 tablet by mouth daily 18  Yes Wesley Frost MD   apixaban (ELIQUIS) 5 MG TABS tablet Take 1 tablet by mouth 2 times daily 18  Yes Wesley Frost MD   fluticasone-vilanterol (BREO ELLIPTA) 100-25 MCG/INH AEPB inhaler Inhale 1 puff into the lungs daily 18  Yes Wesley Frost MD   azelastine (ASTELIN) 0.1 % nasal spray 1 spray by Nasal route 2 times daily Use in each nostril as directed 18  Yes Wesley Frost MD   albuterol sulfate HFA (PROAIR HFA) 108 (90 Base) MCG/ACT inhaler Inhale 2 puffs into the lungs every 6 hours as needed for Wheezing or Shortness of Breath 5/15/18  Yes Wesley Frost MD   esomeprazole Magnesium (NEXIUM) 20 MG PACK Take 20 mg by mouth daily Instructed to take morning of surgery with a sip of water   Yes Historical Provider, MD   SITagliptin (JANUVIA) 100 MG tablet Take 1 tablet by mouth every morning (before breakfast) 18  Yes Wesley Frost MD   metoprolol succinate (TOPROL XL) 25 MG extended release tablet Take 1 tablet by mouth daily 18  Yes Wesley Frost MD   Blood Glucose Monitoring Suppl (ONE TOUCH ULTRA 2) w/Device KIT  17  Yes Historical Provider, MD Breanne ALCALA [address] [facility]  17  Yes Historical Provider, MD   glucose monitoring kit (FREESTYLE) monitoring kit Use once. 17  Yes Wesley Frost MD   glucose blood VI test strips (FREESTYLE TEST STRIPS) strip 1 each by In Vitro route daily As needed.  17  Yes Wesley Frost MD   glucose blood VI test strips (TRUETRACK TEST) strip 1 each by Other route daily As needed. 12/19/16  Yes Shi Rivera MD   SM LANCETS 21G MISC use as directed 5/7/14  Yes Shi Rivera MD   lisinopril-hydrochlorothiazide BAPTISTE FND [facility]) 20-25 MG per tablet Take 1 tablet by mouth daily 6/19/18   Shi Rivera MD   atorvastatin (LIPITOR) 80 MG tablet Take 1 tablet by mouth daily 4/26/18   Shi Rivera MD       Current medications:    Current Outpatient Prescriptions   Medication Sig Dispense Refill    glipiZIDE (GLUCOTROL XL) 2.5 MG extended release tablet Take 1 tablet by mouth daily 30 tablet 5    apixaban (ELIQUIS) 5 MG TABS tablet Take 1 tablet by mouth 2 times daily 60 tablet 5    fluticasone-vilanterol (BREO ELLIPTA) 100-25 MCG/INH AEPB inhaler Inhale 1 puff into the lungs daily 1 each 5    azelastine (ASTELIN) 0.1 % nasal spray 1 spray by Nasal route 2 times daily Use in each nostril as directed 1 Bottle 5    albuterol sulfate HFA (PROAIR HFA) 108 (90 Base) MCG/ACT inhaler Inhale 2 puffs into the lungs every 6 hours as needed for Wheezing or Shortness of Breath 1 Inhaler 5    esomeprazole Magnesium (NEXIUM) 20 MG PACK Take 20 mg by mouth daily Instructed to take morning of surgery with a sip of water      SITagliptin (JANUVIA) 100 MG tablet Take 1 tablet by mouth every morning (before breakfast) 30 tablet 5    metoprolol succinate (TOPROL XL) 25 MG extended release tablet Take 1 tablet by mouth daily 30 tablet 3    Blood Glucose Monitoring Suppl (ONE TOUCH ULTRA 2) w/Device KIT       ONETOUCH DELICA LANCETS 38P MISC       glucose monitoring kit (FREESTYLE) monitoring kit Use once. 1 kit 0    glucose blood VI test strips (FREESTYLE TEST STRIPS) strip 1 each by In Vitro route daily As needed. 50 each 5    glucose blood VI test strips (TRUETRACK TEST) strip 1 each by Other route daily As needed.  100 each 10    SM LANCETS 21G MISC use as directed 100 each 10    lisinopril-hydrochlorothiazide (PRINZIDE;ZESTORETIC) 20-25 MG

## 2018-08-02 VITALS
SYSTOLIC BLOOD PRESSURE: 107 MMHG | HEART RATE: 88 BPM | BODY MASS INDEX: 31.99 KG/M2 | DIASTOLIC BLOOD PRESSURE: 57 MMHG | WEIGHT: 216 LBS | HEIGHT: 69 IN | RESPIRATION RATE: 16 BRPM | TEMPERATURE: 98.1 F | OXYGEN SATURATION: 94 %

## 2018-08-02 PROBLEM — I48.3 TYPICAL ATRIAL FLUTTER (HCC): Status: ACTIVE | Noted: 2018-08-02

## 2018-08-02 PROBLEM — Z86.79 S/P ABLATION OF ATRIAL FLUTTER: Status: ACTIVE | Noted: 2018-08-02

## 2018-08-02 PROBLEM — Z98.890 S/P ABLATION OF ATRIAL FLUTTER: Status: ACTIVE | Noted: 2018-08-02

## 2018-08-02 LAB
ANION GAP SERPL CALCULATED.3IONS-SCNC: 16 MMOL/L (ref 7–16)
BUN BLDV-MCNC: 20 MG/DL (ref 8–23)
CALCIUM SERPL-MCNC: 8.3 MG/DL (ref 8.6–10.2)
CHLORIDE BLD-SCNC: 98 MMOL/L (ref 98–107)
CO2: 22 MMOL/L (ref 22–29)
CREAT SERPL-MCNC: 1.1 MG/DL (ref 0.7–1.2)
GFR AFRICAN AMERICAN: >60
GFR NON-AFRICAN AMERICAN: >60 ML/MIN/1.73
GLUCOSE BLD-MCNC: 274 MG/DL (ref 74–109)
HCT VFR BLD CALC: 33.3 % (ref 37–54)
HEMOGLOBIN: 11.4 G/DL (ref 12.5–16.5)
INR BLD: 1.5
MAGNESIUM: 1.3 MG/DL (ref 1.6–2.6)
MCH RBC QN AUTO: 29.7 PG (ref 26–35)
MCHC RBC AUTO-ENTMCNC: 34.2 % (ref 32–34.5)
MCV RBC AUTO: 86.7 FL (ref 80–99.9)
METER GLUCOSE: 220 MG/DL (ref 70–110)
PDW BLD-RTO: 13.1 FL (ref 11.5–15)
PLATELET # BLD: 185 E9/L (ref 130–450)
PMV BLD AUTO: 10.6 FL (ref 7–12)
POTASSIUM REFLEX MAGNESIUM: 4 MMOL/L (ref 3.5–5)
PROTHROMBIN TIME: 16.7 SEC (ref 9.3–12.4)
RBC # BLD: 3.84 E12/L (ref 3.8–5.8)
SODIUM BLD-SCNC: 136 MMOL/L (ref 132–146)
WBC # BLD: 14.2 E9/L (ref 4.5–11.5)

## 2018-08-02 PROCEDURE — 82962 GLUCOSE BLOOD TEST: CPT

## 2018-08-02 PROCEDURE — 85027 COMPLETE CBC AUTOMATED: CPT

## 2018-08-02 PROCEDURE — 6370000000 HC RX 637 (ALT 250 FOR IP): Performed by: INTERNAL MEDICINE

## 2018-08-02 PROCEDURE — 85610 PROTHROMBIN TIME: CPT

## 2018-08-02 PROCEDURE — 83735 ASSAY OF MAGNESIUM: CPT

## 2018-08-02 PROCEDURE — 2500000003 HC RX 250 WO HCPCS

## 2018-08-02 PROCEDURE — 36415 COLL VENOUS BLD VENIPUNCTURE: CPT

## 2018-08-02 PROCEDURE — 2580000003 HC RX 258: Performed by: INTERNAL MEDICINE

## 2018-08-02 PROCEDURE — 80048 BASIC METABOLIC PNL TOTAL CA: CPT

## 2018-08-02 PROCEDURE — 6360000002 HC RX W HCPCS: Performed by: INTERNAL MEDICINE

## 2018-08-02 RX ORDER — LANOLIN ALCOHOL/MO/W.PET/CERES
400 CREAM (GRAM) TOPICAL DAILY
Status: DISCONTINUED | OUTPATIENT
Start: 2018-08-02 | End: 2018-08-02 | Stop reason: HOSPADM

## 2018-08-02 RX ORDER — MAGNESIUM SULFATE IN WATER 40 MG/ML
4 INJECTION, SOLUTION INTRAVENOUS ONCE
Status: COMPLETED | OUTPATIENT
Start: 2018-08-02 | End: 2018-08-02

## 2018-08-02 RX ORDER — LANOLIN ALCOHOL/MO/W.PET/CERES
400 CREAM (GRAM) TOPICAL DAILY
Qty: 30 TABLET | Refills: 5 | Status: SHIPPED | OUTPATIENT
Start: 2018-08-02 | End: 2018-12-05 | Stop reason: DRUGHIGH

## 2018-08-02 RX ADMIN — Medication 10 ML: at 08:39

## 2018-08-02 RX ADMIN — Medication 400 MG: at 09:23

## 2018-08-02 RX ADMIN — LISINOPRIL 20 MG: 20 TABLET ORAL at 08:38

## 2018-08-02 RX ADMIN — PANTOPRAZOLE SODIUM 40 MG: 40 TABLET, DELAYED RELEASE ORAL at 08:37

## 2018-08-02 RX ADMIN — MAGNESIUM SULFATE HEPTAHYDRATE 4 G: 40 INJECTION, SOLUTION INTRAVENOUS at 09:23

## 2018-08-02 RX ADMIN — METOPROLOL SUCCINATE 25 MG: 25 TABLET, FILM COATED, EXTENDED RELEASE ORAL at 08:38

## 2018-08-02 RX ADMIN — APIXABAN 5 MG: 5 TABLET, FILM COATED ORAL at 08:38

## 2018-08-02 RX ADMIN — GLIPIZIDE 2.5 MG: 5 TABLET ORAL at 07:01

## 2018-08-02 RX ADMIN — LINAGLIPTIN 5 MG: 5 TABLET, FILM COATED ORAL at 08:38

## 2018-08-02 RX ADMIN — HYDROCHLOROTHIAZIDE 25 MG: 25 TABLET ORAL at 08:38

## 2018-08-02 ASSESSMENT — PAIN SCALES - GENERAL
PAINLEVEL_OUTOF10: 0

## 2018-08-02 NOTE — PROGRESS NOTES
[facility] [id number]   Department of Pharmacy   Pharmacist Transition of Care Services         Patient Demographics  Name:  Rajan Greenberg   Medical Record Number:  [id number]  Gender:  male   Age:  80 y.o. YOB: 1950    Address:    [address] [place] [address]  Phone number:  [phone]    Admission date: 8/1/2018  Admission Diagnosis:  S/P radiofrequency ablation operation for arrhythmia [Z98.890, Z86.79]  Admitting Physician: Sandee Barillas MD    Primary Care Physician: Wesley Frost MD  Primary Care Physician phone number:  [phone]  Outpatient Preferred Pharmacy:   [facility] [address] [place] [address] [phone]  [place] [address]  Phone: [phone] Fax: [phone]        Readmission Risk (% from EPIC Patient List): 11%       Patient plans to participate in [address] [place] [address] ANNISTON (Y/N): Unanswered      Pharmacist Review and Summary of Medication Changes Made During Admission     Date of last reviewed/update: 8/2/18    Sources(s) of medication information (check all that apply):  [x] EPIC - documentation for current admission  [] EPIC - documentation of recent physician office visit, Care Everywhere chart   [] Patient - interview or personal medication list   [] Patient's family/caretaker/POA/friend   [] Outpatient Pharmacy - phone call   [] Outpatient Pharmacy - medication bottles  [] Physician's office - phone call   [] OARRS Report  [] Nursing home/rehab/assisted living - printed medication list  [] Nursing home/rehab/assisted living - phone call      Category Comments  (Include Pharmacist Initials and Date)   Change in Outpatient Medication  (Dosage Form, Route,   Dose, or Frequency) 1. New Medication Started 1. Magnesium oxide 400 mg daily          Outpatient Medication Discontinued   (or on Hold During Admission) 1. Other 1.               Pharmacist Patient Education:    Pharmacist

## 2018-08-02 NOTE — PROGRESS NOTES
CLINICAL PHARMACY: DISCHARGE MED RECONCILIATION/REVIEW    [facility]) Select Patient?: Yes  Total # of Interventions Recommended: 0   -   Total # Interventions Accepted: 0  Intervention Severity:   - Level 1 Intervention Present?: No   - Level 2 #: 0   - Level 3 #: 0   Time Spent (min): 15    Additional Documentation:

## 2018-08-21 ENCOUNTER — OFFICE VISIT (OUTPATIENT)
Dept: FAMILY MEDICINE CLINIC | Age: 68
End: 2018-08-21
Payer: MEDICARE

## 2018-08-21 VITALS
OXYGEN SATURATION: 97 % | BODY MASS INDEX: 31.4 KG/M2 | HEART RATE: 82 BPM | RESPIRATION RATE: 16 BRPM | HEIGHT: 69 IN | WEIGHT: 212 LBS | SYSTOLIC BLOOD PRESSURE: 91 MMHG | DIASTOLIC BLOOD PRESSURE: 60 MMHG

## 2018-08-21 DIAGNOSIS — I48.19 PERSISTENT ATRIAL FIBRILLATION (HCC): Primary | ICD-10-CM

## 2018-08-21 DIAGNOSIS — E11.9 CONTROLLED TYPE 2 DIABETES MELLITUS WITHOUT COMPLICATION, UNSPECIFIED WHETHER LONG TERM INSULIN USE (HCC): ICD-10-CM

## 2018-08-21 PROCEDURE — 99214 OFFICE O/P EST MOD 30 MIN: CPT | Performed by: FAMILY MEDICINE

## 2018-08-21 PROCEDURE — 93000 ELECTROCARDIOGRAM COMPLETE: CPT | Performed by: FAMILY MEDICINE

## 2018-08-22 ASSESSMENT — ENCOUNTER SYMPTOMS
COUGH: 0
SHORTNESS OF BREATH: 1
WHEEZING: 0
HEARTBURN: 0
ABDOMINAL PAIN: 0

## 2018-08-22 NOTE — PROGRESS NOTES
Can we bring him in sooner for visit, apparent early recurrence of his AF.   Thanks    Tom Lombardi MD, [facility]  Cardiac Electrophysiology  [facility]) Physicians  [facility] [place]: [place] [facility]  5:53 PM  8/22/2018
type 2, controlled ([address]     Diverticulosis     noted on 3/11 CT    GERD (gastroesophageal reflux disease)     Hyperlipidemia     Hypertension          PE:  VS:  BP 91/60   Pulse 82   Resp 16   Ht 5' 9\" (1.753 m)   Wt 212 lb (96.2 kg)   SpO2 97%   BMI 31.31 kg/m²   Physical Exam  General: Well nourished, well developed, no acute distress  Neck:  Supple   No palpable cervical lymphoadenopathy   Thyroid without mass or enlargement  Resp: Lungs CTAB   Equal and adequate air exchange without accessory muscle use   No rales, rhonchi or wheeze  CV: S1S2 irregular irregular, rate controlled   No murmur   Intact distal pulses   No edema  GI: Abdomen Soft, non tender, non distended   Normoactive bowel sounds  Skin Warm and dry; no rash or lesion  Neuro: Alert and oriented; normal and intact dtr's   Psych: Euthymic mood, congruent affect     Data: EKG showing atrial fibrillation with rate control  Assessment (including specific orders/meds/labs):      Marko Burnett was seen today for medication check. Diagnoses and all orders for this visit:    Persistent atrial fibrillation ([address]  -     EKG 12 Lead; Future  -     EKG 12 Lead    Controlled type 2 diabetes mellitus without complication, unspecified whether long term insulin use (HCC)  -     dapagliflozin (FARXIGA) 5 MG tablet; Take 1 tablet by mouth every morning        Plan:     EKG again showing atrial fibrillation. Understandably this is frustrating to the patient status post ablation. I forwarded the results to his electrophysiologist. I indicated the patient that he should follow up with them as scheduled. His rate is controlled. I think it's reasonable to continue current medications. Call if new issues develop    Patient was counseled extensively regarding diabetes medication options. His desire for a medication that is generic, and expensive, and without side effect is reviewed. However, after considering treatment options, he is not ready to consider injectables.

## 2018-08-23 ENCOUNTER — TELEPHONE (OUTPATIENT)
Dept: NON INVASIVE DIAGNOSTICS | Age: 68
End: 2018-08-23

## 2018-08-23 NOTE — TELEPHONE ENCOUNTER
----- Message from Danny Santoro MD sent at 8/22/2018  5:53 PM EDT -----  Can we bring him in sooner for visit, apparent early recurrence of his AF.   Thanks    Adeola Ward MD, [facility]  Cardiac Electrophysiology  [facility] Physicians  [facility] [place]: [place] [facility]  5:53 PM  8/22/2018

## 2018-08-28 ENCOUNTER — OFFICE VISIT (OUTPATIENT)
Dept: NON INVASIVE DIAGNOSTICS | Age: 68
End: 2018-08-28
Payer: MEDICARE

## 2018-08-28 VITALS
SYSTOLIC BLOOD PRESSURE: 116 MMHG | RESPIRATION RATE: 18 BRPM | BODY MASS INDEX: 31.19 KG/M2 | WEIGHT: 210.6 LBS | DIASTOLIC BLOOD PRESSURE: 68 MMHG | HEIGHT: 69 IN | HEART RATE: 72 BPM

## 2018-08-28 DIAGNOSIS — Z98.890 S/P ABLATION OF ATRIAL FLUTTER: ICD-10-CM

## 2018-08-28 DIAGNOSIS — E66.09 CLASS 1 OBESITY DUE TO EXCESS CALORIES WITH SERIOUS COMORBIDITY AND BODY MASS INDEX (BMI) OF 31.0 TO 31.9 IN ADULT: ICD-10-CM

## 2018-08-28 DIAGNOSIS — Z98.890 S/P ABLATION OF ATRIAL FIBRILLATION: ICD-10-CM

## 2018-08-28 DIAGNOSIS — Z86.79 S/P ABLATION OF ATRIAL FIBRILLATION: ICD-10-CM

## 2018-08-28 DIAGNOSIS — Z86.79 S/P ABLATION OF ATRIAL FLUTTER: ICD-10-CM

## 2018-08-28 DIAGNOSIS — I10 ESSENTIAL HYPERTENSION: ICD-10-CM

## 2018-08-28 DIAGNOSIS — I48.19 PERSISTENT ATRIAL FIBRILLATION (HCC): Primary | ICD-10-CM

## 2018-08-28 PROCEDURE — 99213 OFFICE O/P EST LOW 20 MIN: CPT | Performed by: INTERNAL MEDICINE

## 2018-08-28 PROCEDURE — 93000 ELECTROCARDIOGRAM COMPLETE: CPT | Performed by: INTERNAL MEDICINE

## 2018-08-28 NOTE — Clinical Note
Erica, sent to both [facility] and [facility]- he needs DCCV scheduled, on uninterrupted [address], no JHONNY, no drug.  thanks

## 2018-08-28 NOTE — PROGRESS NOTES
[address] [facility]  Progress note  Patient: Chidi Love  Medical Record Number: [id number]  Date of Procedure:  8/28/2018  Attending Electrophysiologist: Gurpreet Jara MD  Referring Physician: Valeria Soriano MD   CHIEF COMPLAINT: persistent atrial fibrillation    S: He is here for f/u post ablation, he is back in AF. He notes lack of energy/stamina. He notes worse with exertion, less with rest.  No syncope, minimal palpitations. He was recently seen with Dr. Jhon Harkins his PCP. His wise is presents and provided ~50% of history.       Patient Active Problem List    Diagnosis Date Noted    Typical atrial flutter ([address] 08/02/2018    S/P ablation of atrial flutter 08/02/2018    S/P ablation of atrial fibrillation 08/01/2018    Persistent atrial fibrillation ([address] 06/06/2018    Sleep-disordered breathing 06/06/2018    Class 1 obesity due to excess calories with serious comorbidity and body mass index (BMI) of 32.0 to 32.9 in adult 06/06/2018    Fatigue 04/02/2015    Erectile dysfunction 05/06/2012    Diverticulosis     Cholelithiasis     Diabetes type 2, controlled ([address]     Allergic rhinitis     Hyperlipidemia     Essential hypertension     GERD (gastroesophageal reflux disease)        Past Medical History:   Diagnosis Date    A-fib [facility])     follows with Dr. Anaid Perez Allergic rhinitis     Cholelithiasis     noted on 3/11 CT    COPD (chronic obstructive pulmonary disease) ([address]     early stages    Diabetes type 2, controlled ([address]     Diverticulosis     noted on 3/11 CT    GERD (gastroesophageal reflux disease)     Hyperlipidemia     Hypertension      Family History   Problem Relation Age of Onset    Dementia Mother     Heart Disease Father     Other Brother         elevated PSA    Cancer Brother         T cell lymphoma      There is no family history of sudden cardiac arrest    Social History   Substance Use Topics    Smoking status: Former Smoker     Packs/day: 1.50     Years: 15.00     Types: Cigarettes     Quit date: 4/5/1997    Smokeless tobacco: Never Used    Alcohol use Yes      Comment: moderate alcohol use / drinks 12 beers or shots liquor per week     Current Outpatient Prescriptions   Medication Sig Dispense Refill    dapagliflozin (FARXIGA) 5 MG tablet Take 1 tablet by mouth every morning 30 tablet 0    metoprolol succinate (TOPROL XL) 25 MG extended release tablet TAKE ONE TABLET BY MOUTH EVERY DAY 30 tablet 5    magnesium oxide (MAG-OX) 400 (240 Mg) MG tablet Take 1 tablet by mouth daily 30 tablet 5    apixaban (ELIQUIS) 5 MG TABS tablet Take 1 tablet by mouth 2 times daily 60 tablet 5    lisinopril-hydrochlorothiazide (PRINZIDE;ZESTORETIC) 20-25 MG per tablet Take 1 tablet by mouth daily 90 tablet 3    fluticasone-vilanterol (BREO ELLIPTA) 100-25 MCG/INH AEPB inhaler Inhale 1 puff into the lungs daily 1 each 5    azelastine (ASTELIN) 0.1 % nasal spray 1 spray by Nasal route 2 times daily Use in each nostril as directed 1 Bottle 5    albuterol sulfate HFA (PROAIR HFA) 108 (90 Base) MCG/ACT inhaler Inhale 2 puffs into the lungs every 6 hours as needed for Wheezing or Shortness of Breath 1 Inhaler 5    esomeprazole Magnesium (NEXIUM) 20 MG PACK Take 20 mg by mouth daily Instructed to take morning of surgery with a sip of water      atorvastatin (LIPITOR) 80 MG tablet Take 1 tablet by mouth daily 90 tablet 3    ONE TOUCH ULTRA TEST strip TEST ONE TIME DAILY  100 strip 5    ONETOUCH DELICA LANCETS FINE MISC TESTING ONCE DAILY. 100 each 5    Blood Glucose Monitoring Suppl (ONE TOUCH ULTRA 2) w/Device KIT       ONETOUCH DELICA LANCETS 03G MISC       glucose monitoring kit (FREESTYLE) monitoring kit Use once. 1 kit 0    glucose blood VI test strips (FREESTYLE TEST STRIPS) strip 1 each by In Vitro route daily As needed.  50 each 5    glucose blood VI test strips (TRUETRACK TEST) strip 1 each by Other route his PCP in April. He states recently after cutting the grass it felt like he his heart was racing. He believes over the past year he has not felt that well. He believes a few years ago he had the same symptoms - but there was not workup done and attributed the symptoms to GERD. His last know time of being in sinus rhythm was 3 years ago. He underwent attempt at DCCV    1. Persistent atrial fibrillation, likely LSPAF  - CHADSVASC= 3 (HTN, DM, Age)  - [place] [facility] [place]  - current medical therapy includes Eliquis and BB  - thus far has had 1 cardioversion with ERAF 6/2018  - AF ablation with PVI + CTI + Roof line 8/1/2018- difficulty with R vein isolation, incomplete  - ERAF post ablation- discussed DCCV +/- AAD therapy  - we had an extensive discussion regarding options between rate and rhythm control and he is currently using Toprol for rate control  - we had an extensive discussion regarding all 2ndary causes of AFib which include but are not limited to the following and then need for lifestyle modifications and stringent control of contributing medical conditions which include  - We recommended aggressive risk factor modification as previously discussed  - he has lost weight and is doing well with this  - his sleep study is negative for CATRINA   - given <1mo post ablation again too early to consider repeat ablation and thus DCCV +/- AAD offered, he does NOT want to consider additional drugs at this time, partially based on costs/inconvenience  - he opts for DCCV only at this time    2. Hyperlipidemia  - managed by PCP  - on lipitor    3. HTN  - well controlled at this visit   - continue current medications    4. DM  - managed by PCP  - on Januvia  - states blood sugars are well controlled  -   Lab Results   Component Value Date    LABA1C 8.4 07/24/2018     No results found for: EAG  - see #1    5. GERD  - nexium    6.  Diverticulosis  - managed by PCP    7. Obesity  - losing weight, doing

## 2018-09-04 ENCOUNTER — NURSE ONLY (OUTPATIENT)
Dept: NON INVASIVE DIAGNOSTICS | Age: 68
End: 2018-09-04
Payer: MEDICARE

## 2018-09-04 DIAGNOSIS — I48.19 PERSISTENT ATRIAL FIBRILLATION (HCC): Primary | ICD-10-CM

## 2018-09-04 PROCEDURE — 93000 ELECTROCARDIOGRAM COMPLETE: CPT | Performed by: INTERNAL MEDICINE

## 2018-09-06 ENCOUNTER — HOSPITAL ENCOUNTER (OUTPATIENT)
Dept: DIABETES SERVICES | Age: 68
Setting detail: THERAPIES SERIES
Discharge: HOME | End: 2018-09-06
Payer: MEDICARE

## 2018-09-06 VITALS — BODY MASS INDEX: 30.51 KG/M2 | HEIGHT: 69 IN | WEIGHT: 206 LBS

## 2018-09-06 PROCEDURE — 97802 MEDICAL NUTRITION INDIV IN: CPT | Performed by: DIETITIAN, REGISTERED

## 2018-09-06 ASSESSMENT — PATIENT HEALTH QUESTIONNAIRE - PHQ9
SUM OF ALL RESPONSES TO PHQ QUESTIONS 1-9: 0
SUM OF ALL RESPONSES TO PHQ QUESTIONS 1-9: 0

## 2018-09-06 NOTE — PROGRESS NOTES
[]Handouts   [x]Return Demonstration  Education Materials/Equipment Provided:  []Self-Management Manual  [x]Meal Plan []Glucose Meter  []Insulin Kit  [x]Nutritional Packet  []Survival Packet   []Other  [] Gestational Pathway Initiated & Signed    Encounter Type Date Start Time End Time Comments No Show Dates   Assessment 9/6/18 900 65   [x]In Person  []Telephone    Session 1         Session 2        Session 3         Individual MNT 9/6/18 915 1000      Gestational MNT         Shared Med Appt         Yearly Follow-up        Meter Instrx        Insulin Instrx      []Pen  []Vial & Syringe      Additional Comments:    DSMS Support Plan:  Follow-up plan/Date:   Contact Post Class Regarding:   Fasting Blood Sugar   HgbA1C   Weight   Hypertension/Follow-up with Physician   Self-Foot Exam Frequency   Monitoring Frequency   Exercise Routine   Goal Attainment  Post Education Referrals:      []WIC   []PAP   []Wound Care   []Social Service   [] [facility] Specialist   [][facility]   []Sleep Lab   []Other

## 2018-09-07 ENCOUNTER — ANESTHESIA EVENT (OUTPATIENT)
Dept: CARDIAC CATH/INVASIVE PROCEDURES | Age: 68
End: 2018-09-07

## 2018-09-07 ENCOUNTER — HOSPITAL ENCOUNTER (OUTPATIENT)
Dept: CARDIAC CATH/INVASIVE PROCEDURES | Age: 68
Discharge: HOME | End: 2018-09-07
Payer: MEDICARE

## 2018-09-07 ENCOUNTER — ANESTHESIA (OUTPATIENT)
Dept: CARDIAC CATH/INVASIVE PROCEDURES | Age: 68
End: 2018-09-07

## 2018-09-07 VITALS
DIASTOLIC BLOOD PRESSURE: 68 MMHG | RESPIRATION RATE: 18 BRPM | WEIGHT: 206 LBS | OXYGEN SATURATION: 100 % | TEMPERATURE: 97.6 F | HEIGHT: 69 IN | HEART RATE: 71 BPM | SYSTOLIC BLOOD PRESSURE: 106 MMHG | BODY MASS INDEX: 30.51 KG/M2

## 2018-09-07 VITALS
SYSTOLIC BLOOD PRESSURE: 110 MMHG | OXYGEN SATURATION: 99 % | DIASTOLIC BLOOD PRESSURE: 72 MMHG | RESPIRATION RATE: 17 BRPM

## 2018-09-07 LAB
ANION GAP SERPL CALCULATED.3IONS-SCNC: 18 MMOL/L (ref 7–16)
BASOPHILS ABSOLUTE: 0.05 E9/L (ref 0–0.2)
BASOPHILS RELATIVE PERCENT: 0.7 % (ref 0–2)
BUN BLDV-MCNC: 15 MG/DL (ref 8–23)
CALCIUM SERPL-MCNC: 10 MG/DL (ref 8.6–10.2)
CHLORIDE BLD-SCNC: 92 MMOL/L (ref 98–107)
CO2: 27 MMOL/L (ref 22–29)
CREAT SERPL-MCNC: 1.3 MG/DL (ref 0.7–1.2)
EKG ATRIAL RATE: 53 BPM
EKG ATRIAL RATE: 73 BPM
EKG P AXIS: 54 DEGREES
EKG P-R INTERVAL: 202 MS
EKG Q-T INTERVAL: 350 MS
EKG Q-T INTERVAL: 380 MS
EKG QRS DURATION: 88 MS
EKG QRS DURATION: 88 MS
EKG QTC CALCULATION (BAZETT): 418 MS
EKG QTC CALCULATION (BAZETT): 432 MS
EKG R AXIS: -29 DEGREES
EKG R AXIS: -4 DEGREES
EKG T AXIS: 29 DEGREES
EKG T AXIS: 36 DEGREES
EKG VENTRICULAR RATE: 73 BPM
EKG VENTRICULAR RATE: 92 BPM
EOSINOPHILS ABSOLUTE: 0.35 E9/L (ref 0.05–0.5)
EOSINOPHILS RELATIVE PERCENT: 4.8 % (ref 0–6)
GFR AFRICAN AMERICAN: >60
GFR NON-AFRICAN AMERICAN: 55 ML/MIN/1.73
GLUCOSE BLD-MCNC: 148 MG/DL (ref 74–109)
HCT VFR BLD CALC: 43.2 % (ref 37–54)
HEMOGLOBIN: 14.3 G/DL (ref 12.5–16.5)
IMMATURE GRANULOCYTES #: 0.03 E9/L
IMMATURE GRANULOCYTES %: 0.4 % (ref 0–5)
LYMPHOCYTES ABSOLUTE: 1.55 E9/L (ref 1.5–4)
LYMPHOCYTES RELATIVE PERCENT: 21.3 % (ref 20–42)
MCH RBC QN AUTO: 28.9 PG (ref 26–35)
MCHC RBC AUTO-ENTMCNC: 33.1 % (ref 32–34.5)
MCV RBC AUTO: 87.3 FL (ref 80–99.9)
MONOCYTES ABSOLUTE: 0.84 E9/L (ref 0.1–0.95)
MONOCYTES RELATIVE PERCENT: 11.6 % (ref 2–12)
NEUTROPHILS ABSOLUTE: 4.45 E9/L (ref 1.8–7.3)
NEUTROPHILS RELATIVE PERCENT: 61.2 % (ref 43–80)
PDW BLD-RTO: 13.1 FL (ref 11.5–15)
PLATELET # BLD: 213 E9/L (ref 130–450)
PMV BLD AUTO: 10.3 FL (ref 7–12)
POTASSIUM SERPL-SCNC: 3.7 MMOL/L (ref 3.5–5)
RBC # BLD: 4.95 E12/L (ref 3.8–5.8)
SODIUM BLD-SCNC: 137 MMOL/L (ref 132–146)
WBC # BLD: 7.3 E9/L (ref 4.5–11.5)

## 2018-09-07 PROCEDURE — 6360000002 HC RX W HCPCS: Performed by: NURSE ANESTHETIST, CERTIFIED REGISTERED

## 2018-09-07 PROCEDURE — 80048 BASIC METABOLIC PNL TOTAL CA: CPT

## 2018-09-07 PROCEDURE — 93005 ELECTROCARDIOGRAM TRACING: CPT | Performed by: INTERNAL MEDICINE

## 2018-09-07 PROCEDURE — 3700000000 HC ANESTHESIA ATTENDED CARE

## 2018-09-07 PROCEDURE — 92960 CARDIOVERSION ELECTRIC EXT: CPT | Performed by: INTERNAL MEDICINE

## 2018-09-07 PROCEDURE — 85025 COMPLETE CBC W/AUTO DIFF WBC: CPT

## 2018-09-07 PROCEDURE — 36415 COLL VENOUS BLD VENIPUNCTURE: CPT

## 2018-09-07 PROCEDURE — 2709999900 HC NON-CHARGEABLE SUPPLY

## 2018-09-07 PROCEDURE — 2580000003 HC RX 258: Performed by: NURSE ANESTHETIST, CERTIFIED REGISTERED

## 2018-09-07 PROCEDURE — 93010 ELECTROCARDIOGRAM REPORT: CPT | Performed by: INTERNAL MEDICINE

## 2018-09-07 RX ORDER — SODIUM CHLORIDE 9 MG/ML
INJECTION, SOLUTION INTRAVENOUS CONTINUOUS PRN
Status: DISCONTINUED | OUTPATIENT
Start: 2018-09-07 | End: 2018-09-07 | Stop reason: SDUPTHER

## 2018-09-07 RX ORDER — PROPOFOL 10 MG/ML
INJECTION, EMULSION INTRAVENOUS PRN
Status: DISCONTINUED | OUTPATIENT
Start: 2018-09-07 | End: 2018-09-07 | Stop reason: SDUPTHER

## 2018-09-07 RX ADMIN — SODIUM CHLORIDE: 9 INJECTION, SOLUTION INTRAVENOUS at 07:21

## 2018-09-07 RX ADMIN — PROPOFOL 100 MG: 10 INJECTION, EMULSION INTRAVENOUS at 08:10

## 2018-09-07 NOTE — ANESTHESIA POSTPROCEDURE EVALUATION
Department of Anesthesiology  Postprocedure Note    Patient: Rodriguez Walker  MRN: [id number]  YOB: 1950  Date of evaluation: 9/7/2018  Time:  12:30 PM     Procedure Summary     Date:  09/07/18 Room / Location:  [facility] CATH LAB    Anesthesia Start:  0808 Anesthesia Stop:  0820    Procedure:  CARDIOVERSION WITH ANESTHESIA Diagnosis:      Scheduled Providers:   Responsible Provider:  Lynnette Leigh DO    Anesthesia Type:  MAC ASA Status:  3          Anesthesia Type: MAC    Bryce Phase I:      Bryce Phase II:      Last vitals: Reviewed and per EMR flowsheets.        Anesthesia Post Evaluation    Patient location during evaluation: PACU  Patient participation: complete - patient participated  Level of consciousness: awake and alert  Pain score: 1  Airway patency: patent  Nausea & Vomiting: no nausea and no vomiting  Complications: no  Cardiovascular status: hemodynamically stable  Respiratory status: acceptable  Hydration status: euvolemic

## 2018-09-07 NOTE — OP NOTE
[facility] Procedure Note    [facility]  1950    80 y.o.  male  Date of Service: 9/7/2018    Referring Provider: Sydnee Romero MD, Kaushik Leal MD    PROCEDURE:      1. Electrical Cardioversion    INDICATION:    1. Persistent AF. PROCEDURE PERFORMED BY: Bruno Craft DO    PROCEDURE TIME: 15 mins    COMPLICATIONS: None. ANESTHESIA: Per Anesthesia    DESCRIPTION OF PROCEDURE: The risks, benefits, and alternatives to the procedure were discussed with the patient, and informed consent was obtained. The patient was brought to the cardiovascular lab and sedated under the guidance of anesthesia. Once anesthesia was deemed adequate, a 300 J biphasic synchronous shock was applied. The patient was then allowed to wake in the usual fashion. SUMMARY:    1. Successful cardioversion of atrial fibrillation to sinus rhythm. RECOMMENDATIONS:  1. Discharge to home when fully awake and alert, if clinically stable. 2. Resume all pre-procedure medications, including anticoagulation. 3. Follow-up in the office in 1 month with Dr. Yosvany Oviedo or HERMINIA.     Bruno Craft DO  [facility]  [address] [facility]

## 2018-09-07 NOTE — ANESTHESIA PRE PROCEDURE
Provider, MD [address] [facility]  6/16/17   Historical Provider, MD   glucose monitoring kit (FREESTYLE) monitoring kit Use once. 2/16/17   Audelia Dumas MD   glucose blood VI test strips (FREESTYLE TEST STRIPS) strip 1 each by In Vitro route daily As needed. 2/16/17   Audelia Dumas MD   glucose blood VI test strips (TRUETRACK TEST) strip 1 each by Other route daily As needed. 12/19/16   Audelia Dumas MD   SM LANCETS 21G MISC use as directed 5/7/14   Audelia Dumas MD       Current medications:    Current Outpatient Prescriptions   Medication Sig Dispense Refill    dapagliflozin (FARXIGA) 5 MG tablet Take 1 tablet by mouth every morning 30 tablet 0    metoprolol succinate (TOPROL XL) 25 MG extended release tablet TAKE ONE TABLET BY MOUTH EVERY DAY 30 tablet 5    magnesium oxide (MAG-OX) 400 (240 Mg) MG tablet Take 1 tablet by mouth daily 30 tablet 5    apixaban (ELIQUIS) 5 MG TABS tablet Take 1 tablet by mouth 2 times daily 60 tablet 5    lisinopril-hydrochlorothiazide (PRINZIDE;ZESTORETIC) 20-25 MG per tablet Take 1 tablet by mouth daily 90 tablet 3    fluticasone-vilanterol (BREO ELLIPTA) 100-25 MCG/INH AEPB inhaler Inhale 1 puff into the lungs daily 1 each 5    azelastine (ASTELIN) 0.1 % nasal spray 1 spray by Nasal route 2 times daily Use in each nostril as directed 1 Bottle 5    albuterol sulfate HFA (PROAIR HFA) 108 (90 Base) MCG/ACT inhaler Inhale 2 puffs into the lungs every 6 hours as needed for Wheezing or Shortness of Breath 1 Inhaler 5    esomeprazole Magnesium (NEXIUM) 20 MG PACK Take 20 mg by mouth daily Instructed to take morning of surgery with a sip of water      atorvastatin (LIPITOR) 80 MG tablet Take 1 tablet by mouth daily 90 tablet 3    ONE TOUCH ULTRA TEST strip TEST ONE TIME DAILY  100 strip 5    ONETOUCH DELICA LANCETS FINE MISC TESTING ONCE DAILY.  100 each 5    Blood Glucose Monitoring Suppl (ONE TOUCH ULTRA 2) w/Device KIT       [address]onte Syed LANCETS 33G MISC       glucose monitoring kit (FREESTYLE) monitoring kit Use once. 1 kit 0    glucose blood VI test strips (FREESTYLE TEST STRIPS) strip 1 each by In Vitro route daily As needed. 50 each 5    glucose blood VI test strips (TRUETRACK TEST) strip 1 each by Other route daily As needed. 100 each 10    SM LANCETS 21G MISC use as directed 100 each 10     No current facility-administered medications for this encounter.         Allergies:  No Known Allergies    Problem List:    Patient Active Problem List   Diagnosis Code    Diabetes type 2, controlled ([facility] E11.9    Allergic rhinitis J30.9    Hyperlipidemia E78.5    Essential hypertension I10    GERD (gastroesophageal reflux disease) K21.9    Diverticulosis K57.90    Cholelithiasis K80.20    Erectile dysfunction N52.9    Fatigue R53.83    Persistent atrial fibrillation (HCC) I48.1    Sleep-disordered breathing G47.30    Class 1 obesity due to excess calories with serious comorbidity and body mass index (BMI) of 32.0 to 32.9 in adult E66.09, Z68.32    S/P ablation of atrial fibrillation Z98.890, Z86.79    Typical atrial flutter (HCC) I48.3    S/P ablation of atrial flutter Z98.890, Z86.79       Past Medical History:        Diagnosis Date    A-fib ([facility]     follows with Dr. Pennie Pratt Allergic rhinitis     Cholelithiasis     noted on 3/11 CT    COPD (chronic obstructive pulmonary disease) ([facility]     early stages    Diabetes type 2, controlled ([facility]     Diverticulosis     noted on 3/11 CT    GERD (gastroesophageal reflux disease)     Hyperlipidemia     Hypertension        Past Surgical History:        Procedure Laterality Date    ABLATION OF DYSRHYTHMIC FOCUS  08/01/2018    Afib/Aflutter ablation    APPENDECTOMY      COLONOSCOPY         Social History:    Social History   Substance Use Topics    Smoking status: Former Smoker     Packs/day: 1.50     Years: 15.00     Types: Cigarettes     Quit date: 4/5/1997    Smokeless tobacco: Never Used    Alcohol use Yes      Comment: moderate alcohol use / drinks 12 beers or shots liquor per week                                Counseling given: Not Answered      Vital Signs (Current):   Vitals:    09/07/18 0745   BP: (!) 154/86   Pulse: 100   Resp: 18   Temp: 36.4 °C (97.6 °F)   SpO2: 97%   Weight: 206 lb (93.4 kg)   Height: 5' 9\" (1.753 m)                                              BP Readings from Last 3 Encounters:   09/07/18 (!) 154/86   08/28/18 116/68   08/21/18 91/60       NPO Status:  9/7/2018 0000                                                                               BMI:   Wt Readings from Last 3 Encounters:   09/07/18 206 lb (93.4 kg)   09/06/18 206 lb (93.4 kg)   08/28/18 210 lb 9.6 oz (95.5 kg)     Body mass index is 30.42 kg/m². CBC:   Lab Results   Component Value Date    WBC 14.2 08/02/2018    RBC 3.84 08/02/2018    HGB 11.4 08/02/2018    HCT 33.3 08/02/2018    MCV 86.7 08/02/2018    RDW 13.1 08/02/2018     08/02/2018       CMP:   Lab Results   Component Value Date     08/02/2018    K 4.0 08/02/2018    CL 98 08/02/2018    CO2 22 08/02/2018    BUN 20 08/02/2018    CREATININE 1.1 08/02/2018    GFRAA >60 08/02/2018    LABGLOM >60 08/02/2018    GLUCOSE 274 08/02/2018    GLUCOSE 107 01/03/2012    PROT 8.4 04/18/2018    CALCIUM 8.3 08/02/2018    BILITOT 0.7 04/18/2018    ALKPHOS 47 04/18/2018    AST 15 04/18/2018    ALT 21 04/18/2018       POC Tests: No results for input(s): POCGLU, POCNA, POCK, POCCL, POCBUN, POCHEMO, POCHCT in the last 72 hours.     Coags:   Lab Results   Component Value Date    PROTIME 16.7 08/02/2018    INR 1.5 08/02/2018    APTT 33.5 07/27/2018       HCG (If Applicable): No results found for: PREGTESTUR, PREGSERUM, HCG, HCGQUANT     ABGs: No results found for: PHART, PO2ART, ZEI8SNV, SOO2BOZ, BEART, C4MWMTVW     Type & Screen (If Applicable):  No results found for: LABABO, LABRH     EKG 9/7/2018 rate 92  Narrative     Atrial fibrillation  Abnormal ECG  When compared with ECG of 01-AUG-2018 17:42,  Significant changes have occurred     2D echo: 5/17/2018   Summary   Normal diastolic function.   Ejection fraction is visually estimated at 55-65%.   Left atrium is of normal size.   Atrial fibrillation   Normal mitral valve structure and function.   The aortic valve is trileaflet.   The aortic valve appears mildly sclerotic.   No evidence of pericardial effusion. 7/27/2018 [facility] Heart   Impression   1. No abnormality of the left atrium appreciated. Orifice measurements   as described above for the pulmonary veins. Anesthesia Evaluation  Patient summary reviewed and Nursing notes reviewed no history of anesthetic complications:   Airway: Mallampati: II  TM distance: >3 FB   Neck ROM: full  Mouth opening: > = 3 FB Dental:          Pulmonary: breath sounds clear to auscultation  (+) COPD:            Patient did not smoke on day of surgery. ROS comment: Former smoker - 22.5 pack years, quit in 1997  Allergic rhinitis   Cardiovascular:    (+) hypertension:, dysrhythmias (s/p afib ablation and aflutter ablation, Per patient - ablation 9/1/2018): atrial fibrillation and atrial flutter, hyperlipidemia      ECG reviewed  Rhythm: irregular  Rate: normal  Echocardiogram reviewed                  Neuro/Psych:   Negative Neuro/Psych ROS              GI/Hepatic/Renal:   (+) GERD:,          ROS comment: Diverticulitis  cholelithiasis. Endo/Other:    (+) DiabetesType II DM, well controlled, , .                 Abdominal:           Vascular: negative vascular ROS. Anesthesia Plan      MAC     ASA 3     (#20g L wrist)  Induction: intravenous. Anesthetic plan and risks discussed with patient. Plan discussed with CRNA and attending. Norma Hoskins RN   9/7/2018    Patient seen and examined, chart reviewed, agree with above findings.   Anesthetic plan, risks, benefits,

## 2018-09-11 ENCOUNTER — PATIENT MESSAGE (OUTPATIENT)
Dept: FAMILY MEDICINE CLINIC | Age: 68
End: 2018-09-11

## 2018-09-12 ENCOUNTER — TELEPHONE (OUTPATIENT)
Dept: NON INVASIVE DIAGNOSTICS | Age: 68
End: 2018-09-12

## 2018-09-20 ENCOUNTER — PATIENT MESSAGE (OUTPATIENT)
Dept: NON INVASIVE DIAGNOSTICS | Age: 68
End: 2018-09-20

## 2018-09-20 NOTE — TELEPHONE ENCOUNTER
Called and explained my-chart to the patient. Patient and I talked about his afib and wants to wait until Oct 11th.2018 to discuss his options.

## 2018-10-11 ENCOUNTER — OFFICE VISIT (OUTPATIENT)
Dept: NON INVASIVE DIAGNOSTICS | Age: 68
End: 2018-10-11
Payer: MEDICARE

## 2018-10-11 VITALS
BODY MASS INDEX: 30.81 KG/M2 | HEIGHT: 69 IN | RESPIRATION RATE: 14 BRPM | SYSTOLIC BLOOD PRESSURE: 102 MMHG | WEIGHT: 208 LBS | HEART RATE: 63 BPM | DIASTOLIC BLOOD PRESSURE: 68 MMHG

## 2018-10-11 DIAGNOSIS — Z86.79 S/P ABLATION OF ATRIAL FIBRILLATION: ICD-10-CM

## 2018-10-11 DIAGNOSIS — I48.19 PERSISTENT ATRIAL FIBRILLATION (HCC): Primary | ICD-10-CM

## 2018-10-11 DIAGNOSIS — Z98.890 S/P ABLATION OF ATRIAL FLUTTER: ICD-10-CM

## 2018-10-11 DIAGNOSIS — Z98.890 S/P ABLATION OF ATRIAL FIBRILLATION: ICD-10-CM

## 2018-10-11 DIAGNOSIS — I48.3 TYPICAL ATRIAL FLUTTER (HCC): ICD-10-CM

## 2018-10-11 DIAGNOSIS — I10 ESSENTIAL HYPERTENSION: ICD-10-CM

## 2018-10-11 DIAGNOSIS — E66.09 CLASS 1 OBESITY DUE TO EXCESS CALORIES WITH SERIOUS COMORBIDITY AND BODY MASS INDEX (BMI) OF 32.0 TO 32.9 IN ADULT: ICD-10-CM

## 2018-10-11 DIAGNOSIS — Z86.79 S/P ABLATION OF ATRIAL FLUTTER: ICD-10-CM

## 2018-10-11 PROCEDURE — 99213 OFFICE O/P EST LOW 20 MIN: CPT | Performed by: INTERNAL MEDICINE

## 2018-10-11 PROCEDURE — 93000 ELECTROCARDIOGRAM COMPLETE: CPT | Performed by: INTERNAL MEDICINE

## 2018-10-11 NOTE — PROGRESS NOTES
[address] [facility] [address], [address]  [id number]  [phone]                Pharmacologic Stress Nuclear Gated SPECT Study    Name: [facility] Account Number: [de-identified]    :  1950          Sex: male         Date of Study:  2018    Height: 5' 9\" (175.3 cm)         Weight: 218 lb (98.9 kg)     Ordering Provider: Lynn Shay. Rae Saleh MD          PCP: Ivanna Serrano MD      Cardiologist: Lynn Shay. Rae Saleh MD                Interpreting Physician:Oc Saleh MD      __________________________________________________________________  _______________    Indication:   Detecting the presence and location of coronary artery disease    Clinical History:   Patient has no known history of coronary   artery disease. Resting ECG:    HI intsec, QRS int0.08 sec, QT int sec; HR 70 bpm  Atrial fibrillation with controlled ventricular response    Procedure:   Pharmacologic stress testing was performed with regadenoson 0.4   mg for 15 seconds. Additionally, low-level exercise was performed   along with the infusion.  The heart rate was 70 at baseline and   jonny to 123 beats during the infusion.  This corresponds to 80%   of maximum predicted heart rate.  The blood pressure at baseline   was 110/70 and blood pressure at the end of infusion was 116/70.    Blood pressure response was normal during the stress procedure. The patient experienced mild shortness of breath, leg heaviness,   and headache during the infusion. ECG during the infusion did not change. IMAGING: Myocardial perfusion imaging was performed at rest 30-35   minutes following the intravenous injection of 10.1 mCi of   (Tc-Sestamibi) followed by 10 ml of Normal Saline.  As per   infusion protocol, the patient was injected intravenously with   31.7 mCi of (Tc-Sestamibi) followed by 10 ml of Normal Saline.    Gated post-stress tomographic imaging was performed 20-25 minutes   after stress.

## 2018-11-06 RX ORDER — LANCETS 33 GAUGE
EACH MISCELLANEOUS
Qty: 100 EACH | Refills: 4 | Status: SHIPPED
Start: 2018-11-06 | End: 2022-03-10

## 2018-11-21 ENCOUNTER — OFFICE VISIT (OUTPATIENT)
Dept: FAMILY MEDICINE CLINIC | Age: 68
End: 2018-11-21
Payer: MEDICARE

## 2018-11-21 VITALS
HEART RATE: 65 BPM | OXYGEN SATURATION: 97 % | HEIGHT: 69 IN | WEIGHT: 203 LBS | DIASTOLIC BLOOD PRESSURE: 80 MMHG | BODY MASS INDEX: 30.07 KG/M2 | RESPIRATION RATE: 16 BRPM | SYSTOLIC BLOOD PRESSURE: 124 MMHG

## 2018-11-21 DIAGNOSIS — I48.91 NEW ONSET ATRIAL FIBRILLATION (HCC): ICD-10-CM

## 2018-11-21 DIAGNOSIS — I48.19 PERSISTENT ATRIAL FIBRILLATION (HCC): Primary | ICD-10-CM

## 2018-11-21 DIAGNOSIS — Z79.899 MEDICATION MANAGEMENT: ICD-10-CM

## 2018-11-21 DIAGNOSIS — E11.9 CONTROLLED TYPE 2 DIABETES MELLITUS WITHOUT COMPLICATION, UNSPECIFIED WHETHER LONG TERM INSULIN USE (HCC): ICD-10-CM

## 2018-11-21 DIAGNOSIS — E78.2 MIXED HYPERLIPIDEMIA: ICD-10-CM

## 2018-11-21 LAB
CREATININE URINE POCT: NORMAL
HBA1C MFR BLD: 7 %
MICROALBUMIN/CREAT 24H UR: NORMAL MG/G{CREAT}
MICROALBUMIN/CREAT UR-RTO: NORMAL

## 2018-11-21 PROCEDURE — 83036 HEMOGLOBIN GLYCOSYLATED A1C: CPT | Performed by: FAMILY MEDICINE

## 2018-11-21 PROCEDURE — 99214 OFFICE O/P EST MOD 30 MIN: CPT | Performed by: FAMILY MEDICINE

## 2018-11-21 PROCEDURE — 82044 UR ALBUMIN SEMIQUANTITATIVE: CPT | Performed by: FAMILY MEDICINE

## 2018-11-21 RX ORDER — METOPROLOL SUCCINATE 25 MG/1
TABLET, EXTENDED RELEASE ORAL
Qty: 180 TABLET | Refills: 3 | Status: SHIPPED | OUTPATIENT
Start: 2018-11-21 | End: 2019-11-05 | Stop reason: SDUPTHER

## 2018-11-21 ASSESSMENT — ENCOUNTER SYMPTOMS
BLOOD IN STOOL: 0
COUGH: 0
SHORTNESS OF BREATH: 0
WHEEZING: 0
ABDOMINAL PAIN: 0

## 2018-12-04 ENCOUNTER — HOSPITAL ENCOUNTER (OUTPATIENT)
Age: 68
Discharge: HOME | End: 2018-12-04
Payer: MEDICARE

## 2018-12-04 DIAGNOSIS — E78.2 MIXED HYPERLIPIDEMIA: ICD-10-CM

## 2018-12-04 DIAGNOSIS — I48.19 PERSISTENT ATRIAL FIBRILLATION (HCC): ICD-10-CM

## 2018-12-04 LAB
ALBUMIN SERPL-MCNC: 4.5 G/DL (ref 3.5–5.2)
ALP BLD-CCNC: 69 U/L (ref 40–129)
ALT SERPL-CCNC: 18 U/L (ref 0–40)
ANION GAP SERPL CALCULATED.3IONS-SCNC: 15 MMOL/L (ref 7–16)
AST SERPL-CCNC: 17 U/L (ref 0–39)
BILIRUB SERPL-MCNC: 0.7 MG/DL (ref 0–1.2)
BUN BLDV-MCNC: 12 MG/DL (ref 8–23)
CALCIUM SERPL-MCNC: 9.8 MG/DL (ref 8.6–10.2)
CHLORIDE BLD-SCNC: 92 MMOL/L (ref 98–107)
CHOLESTEROL, FASTING: 192 MG/DL (ref 0–199)
CO2: 30 MMOL/L (ref 22–29)
CREAT SERPL-MCNC: 1.1 MG/DL (ref 0.7–1.2)
GFR AFRICAN AMERICAN: >60
GFR NON-AFRICAN AMERICAN: >60 ML/MIN/1.73
GLUCOSE FASTING: 156 MG/DL (ref 74–99)
HDLC SERPL-MCNC: 40 MG/DL
LDL CHOLESTEROL CALCULATED: 119 MG/DL (ref 0–99)
MAGNESIUM: 1.4 MG/DL (ref 1.6–2.6)
POTASSIUM SERPL-SCNC: 3.2 MMOL/L (ref 3.5–5)
SODIUM BLD-SCNC: 137 MMOL/L (ref 132–146)
TOTAL PROTEIN: 7.7 G/DL (ref 6.4–8.3)
TRIGLYCERIDE, FASTING: 166 MG/DL (ref 0–149)
VLDLC SERPL CALC-MCNC: 33 MG/DL

## 2018-12-04 PROCEDURE — 80053 COMPREHEN METABOLIC PANEL: CPT

## 2018-12-04 PROCEDURE — 36415 COLL VENOUS BLD VENIPUNCTURE: CPT

## 2018-12-04 PROCEDURE — 83735 ASSAY OF MAGNESIUM: CPT

## 2018-12-04 PROCEDURE — 80061 LIPID PANEL: CPT

## 2018-12-05 DIAGNOSIS — E87.6 HYPOKALEMIA: Primary | ICD-10-CM

## 2018-12-05 DIAGNOSIS — E83.42 HYPOMAGNESEMIA: ICD-10-CM

## 2018-12-05 RX ORDER — LANOLIN ALCOHOL/MO/W.PET/CERES
400 CREAM (GRAM) TOPICAL 2 TIMES DAILY
Qty: 60 TABLET | Refills: 5 | Status: SHIPPED | OUTPATIENT
Start: 2018-12-05

## 2018-12-05 RX ORDER — POTASSIUM CHLORIDE 20 MEQ/1
20 TABLET, EXTENDED RELEASE ORAL DAILY
Qty: 30 TABLET | Refills: 5 | Status: SHIPPED | OUTPATIENT
Start: 2018-12-05 | End: 2019-04-29 | Stop reason: SDUPTHER

## 2019-01-09 LAB — DIABETIC RETINOPATHY: NEGATIVE

## 2019-03-19 ENCOUNTER — NURSE ONLY (OUTPATIENT)
Dept: NON INVASIVE DIAGNOSTICS | Age: 69
End: 2019-03-19
Payer: MEDICARE

## 2019-03-19 ENCOUNTER — TELEPHONE (OUTPATIENT)
Dept: NON INVASIVE DIAGNOSTICS | Age: 69
End: 2019-03-19

## 2019-03-19 DIAGNOSIS — I48.19 PERSISTENT ATRIAL FIBRILLATION (HCC): Primary | ICD-10-CM

## 2019-03-19 PROCEDURE — 93000 ELECTROCARDIOGRAM COMPLETE: CPT | Performed by: INTERNAL MEDICINE

## 2019-04-02 DIAGNOSIS — Z79.899 MEDICATION MANAGEMENT: ICD-10-CM

## 2019-04-02 RX ORDER — ATORVASTATIN CALCIUM 80 MG/1
TABLET, FILM COATED ORAL
Qty: 90 TABLET | Refills: 2 | Status: SHIPPED | OUTPATIENT
Start: 2019-04-02 | End: 2019-12-30 | Stop reason: SDUPTHER

## 2019-04-03 DIAGNOSIS — E11.9 CONTROLLED TYPE 2 DIABETES MELLITUS WITHOUT COMPLICATION, UNSPECIFIED WHETHER LONG TERM INSULIN USE (HCC): Primary | ICD-10-CM

## 2019-04-03 DIAGNOSIS — I48.91 ATRIAL FIBRILLATION, UNSPECIFIED TYPE (HCC): ICD-10-CM

## 2019-04-10 ENCOUNTER — HOSPITAL ENCOUNTER (OUTPATIENT)
Age: 69
Discharge: HOME | End: 2019-04-10
Payer: MEDICARE

## 2019-04-10 DIAGNOSIS — E11.9 CONTROLLED TYPE 2 DIABETES MELLITUS WITHOUT COMPLICATION, UNSPECIFIED WHETHER LONG TERM INSULIN USE (HCC): ICD-10-CM

## 2019-04-10 DIAGNOSIS — I48.91 ATRIAL FIBRILLATION, UNSPECIFIED TYPE (HCC): ICD-10-CM

## 2019-04-10 LAB
ALBUMIN SERPL-MCNC: 4.8 G/DL (ref 3.5–5.2)
ALP BLD-CCNC: 59 U/L (ref 40–129)
ALT SERPL-CCNC: 22 U/L (ref 0–40)
ANION GAP SERPL CALCULATED.3IONS-SCNC: 13 MMOL/L (ref 7–16)
AST SERPL-CCNC: 20 U/L (ref 0–39)
BASOPHILS ABSOLUTE: 0.05 E9/L (ref 0–0.2)
BASOPHILS RELATIVE PERCENT: 0.8 % (ref 0–2)
BILIRUB SERPL-MCNC: 0.6 MG/DL (ref 0–1.2)
BUN BLDV-MCNC: 15 MG/DL (ref 8–23)
CALCIUM SERPL-MCNC: 9.9 MG/DL (ref 8.6–10.2)
CHLORIDE BLD-SCNC: 96 MMOL/L (ref 98–107)
CHOLESTEROL, TOTAL: 179 MG/DL (ref 0–199)
CO2: 28 MMOL/L (ref 22–29)
CREAT SERPL-MCNC: 1.1 MG/DL (ref 0.7–1.2)
CREATININE URINE: 72 MG/DL (ref 40–278)
EOSINOPHILS ABSOLUTE: 0.41 E9/L (ref 0.05–0.5)
EOSINOPHILS RELATIVE PERCENT: 6.4 % (ref 0–6)
GFR AFRICAN AMERICAN: >60
GFR NON-AFRICAN AMERICAN: >60 ML/MIN/1.73
GLUCOSE BLD-MCNC: 136 MG/DL (ref 74–99)
HBA1C MFR BLD: 7.3 % (ref 4–5.6)
HCT VFR BLD CALC: 43.9 % (ref 37–54)
HDLC SERPL-MCNC: 38 MG/DL
HEMOGLOBIN: 14.5 G/DL (ref 12.5–16.5)
IMMATURE GRANULOCYTES #: 0.02 E9/L
IMMATURE GRANULOCYTES %: 0.3 % (ref 0–5)
LDL CHOLESTEROL CALCULATED: 106 MG/DL (ref 0–99)
LYMPHOCYTES ABSOLUTE: 1.62 E9/L (ref 1.5–4)
LYMPHOCYTES RELATIVE PERCENT: 25.3 % (ref 20–42)
MCH RBC QN AUTO: 29.5 PG (ref 26–35)
MCHC RBC AUTO-ENTMCNC: 33 % (ref 32–34.5)
MCV RBC AUTO: 89.2 FL (ref 80–99.9)
MICROALBUMIN UR-MCNC: <12 MG/L
MICROALBUMIN/CREAT UR-RTO: ABNORMAL (ref 0–30)
MONOCYTES ABSOLUTE: 0.65 E9/L (ref 0.1–0.95)
MONOCYTES RELATIVE PERCENT: 10.2 % (ref 2–12)
NEUTROPHILS ABSOLUTE: 3.65 E9/L (ref 1.8–7.3)
NEUTROPHILS RELATIVE PERCENT: 57 % (ref 43–80)
PDW BLD-RTO: 12.8 FL (ref 11.5–15)
PLATELET # BLD: 195 E9/L (ref 130–450)
PMV BLD AUTO: 10.5 FL (ref 7–12)
POTASSIUM SERPL-SCNC: 4.1 MMOL/L (ref 3.5–5)
RBC # BLD: 4.92 E12/L (ref 3.8–5.8)
SODIUM BLD-SCNC: 137 MMOL/L (ref 132–146)
TOTAL PROTEIN: 7.8 G/DL (ref 6.4–8.3)
TRIGL SERPL-MCNC: 177 MG/DL (ref 0–149)
TSH SERPL DL<=0.05 MIU/L-ACNC: 2.14 UIU/ML (ref 0.27–4.2)
VLDLC SERPL CALC-MCNC: 35 MG/DL
WBC # BLD: 6.4 E9/L (ref 4.5–11.5)

## 2019-04-10 PROCEDURE — 80053 COMPREHEN METABOLIC PANEL: CPT

## 2019-04-10 PROCEDURE — 84443 ASSAY THYROID STIM HORMONE: CPT

## 2019-04-10 PROCEDURE — 83036 HEMOGLOBIN GLYCOSYLATED A1C: CPT

## 2019-04-10 PROCEDURE — 85025 COMPLETE CBC W/AUTO DIFF WBC: CPT

## 2019-04-10 PROCEDURE — 80061 LIPID PANEL: CPT

## 2019-04-10 PROCEDURE — 36415 COLL VENOUS BLD VENIPUNCTURE: CPT

## 2019-04-10 PROCEDURE — 82044 UR ALBUMIN SEMIQUANTITATIVE: CPT

## 2019-04-10 PROCEDURE — 82570 ASSAY OF URINE CREATININE: CPT

## 2019-04-24 ENCOUNTER — OFFICE VISIT (OUTPATIENT)
Dept: FAMILY MEDICINE CLINIC | Age: 69
End: 2019-04-24
Payer: MEDICARE

## 2019-04-24 VITALS
DIASTOLIC BLOOD PRESSURE: 69 MMHG | OXYGEN SATURATION: 98 % | WEIGHT: 209 LBS | SYSTOLIC BLOOD PRESSURE: 124 MMHG | BODY MASS INDEX: 30.96 KG/M2 | HEART RATE: 83 BPM | RESPIRATION RATE: 16 BRPM | HEIGHT: 69 IN

## 2019-04-24 DIAGNOSIS — Z13.6 SCREENING FOR AAA (ABDOMINAL AORTIC ANEURYSM): ICD-10-CM

## 2019-04-24 DIAGNOSIS — R53.83 FATIGUE, UNSPECIFIED TYPE: ICD-10-CM

## 2019-04-24 DIAGNOSIS — I48.19 PERSISTENT ATRIAL FIBRILLATION (HCC): ICD-10-CM

## 2019-04-24 DIAGNOSIS — Z79.899 MEDICATION MANAGEMENT: ICD-10-CM

## 2019-04-24 DIAGNOSIS — E11.9 CONTROLLED TYPE 2 DIABETES MELLITUS WITHOUT COMPLICATION, UNSPECIFIED WHETHER LONG TERM INSULIN USE (HCC): Primary | ICD-10-CM

## 2019-04-24 DIAGNOSIS — K21.00 GASTROESOPHAGEAL REFLUX DISEASE WITH ESOPHAGITIS: ICD-10-CM

## 2019-04-24 PROCEDURE — 99214 OFFICE O/P EST MOD 30 MIN: CPT | Performed by: FAMILY MEDICINE

## 2019-04-24 RX ORDER — LISINOPRIL AND HYDROCHLOROTHIAZIDE 25; 20 MG/1; MG/1
1 TABLET ORAL DAILY
Qty: 90 TABLET | Refills: 3 | Status: SHIPPED
Start: 2019-04-24 | End: 2020-04-14 | Stop reason: SDUPTHER

## 2019-04-24 ASSESSMENT — PATIENT HEALTH QUESTIONNAIRE - PHQ9
2. FEELING DOWN, DEPRESSED OR HOPELESS: 0
1. LITTLE INTEREST OR PLEASURE IN DOING THINGS: 0
SUM OF ALL RESPONSES TO PHQ QUESTIONS 1-9: 0
SUM OF ALL RESPONSES TO PHQ9 QUESTIONS 1 & 2: 0
SUM OF ALL RESPONSES TO PHQ QUESTIONS 1-9: 0

## 2019-04-24 NOTE — PROGRESS NOTES
CC   Chief Complaint   Patient presents with    Atrial Fibrillation     HPI:   Patient comes in today for the below issue(s). Atrial fibrillation  Follow up  Is on anticoagulation (Eliquis)   Paroxysmal but feels that he is more in fibrillation than out. Gets limited exertional tolerance from this  No chest pain, tachycardia or symptomatically having palpitations   No syncope  meds reviewed, compliant   Denies bleeding or bruising   Wants off Eliquis and all other anticoagulants. Failed cardioversion and ablation   Sees new EP doctor later this month    GERD   Follow-up  Patient doing well. Continues to take Nexium, but only taking it intermittently  States heartburn is resolved. No dysphagia or odontophagia. No abdominal pain. Bowels are regular    Diabetes:  Type 2  Chronic issue, present for years  Patient feels well. Issue is  controlled. Patient does not have complaints or concerns today. Medications reviewed. Currently on Glucophage XRTaking all medications and tolerating well. Glucose screens being checked  no  hypoglycemic episodes no  Patient is reporting polyphagia. Denies other SSx  Patient is taking ASA No: Eliquis   Taking  Ace Inhibitor/ARB. Yes  Patient is  on appropriately-dosed statin. Patient does not see Podiatry regularly. Patient does not smoke. Most recent labs reviewed with patient. Lab Results   Component Value Date    LABA1C 7.3 (H) 04/10/2019     No results found for: EAG  Lab Results   Component Value Date    LDLCALC 106 (H) 04/10/2019         Review of Systems  Review of Systems   Constitutional: Positive for fatigue. Negative for chills, diaphoresis, fever and unexpected weight change. HENT: Negative for congestion, trouble swallowing and voice change. Respiratory: Negative for cough, shortness of breath and wheezing. Cardiovascular: Positive for palpitations. Negative for chest pain and leg swelling.    Gastrointestinal: Negative for abdominal pain, blood in stool, constipation and diarrhea. Genitourinary: Negative for difficulty urinating and hematuria. Musculoskeletal: Negative for arthralgias. Neurological: Negative for dizziness, syncope and headaches. Hematological: Does not bruise/bleed easily. Past Medical History:   Diagnosis Date    A-fib [facility])     follows with Dr. Marianne John Allergic rhinitis     Cholelithiasis     noted on 3/11 CT    COPD (chronic obstructive pulmonary disease) ([facility]     early stages    Diabetes type 2, controlled ([facility]     Diverticulosis     noted on 3/11 CT    GERD (gastroesophageal reflux disease)     Hyperlipidemia     Hypertension          PE:  VS:  /69   Pulse 83   Resp 16   Ht 5' 9\" (1.753 m)   Wt 209 lb (94.8 kg)   SpO2 98%   BMI 30.86 kg/m²   Physical Exam  General: Well nourished, well developed, no acute distress  Eyes:  PERRL   Conjunctiva unremarkable   ENT:  TM's intact bilaterally, no effusion   Nasal:  No mucosal edema     No nasal drainage   Oral:  mucosa moist and pink             no posterior pharyngeal drainage     no posterior pharyngeal exudate  Neck:  Supple   No palpable cervical lymphoadenopathy   Thyroid without mass or enlargement  Resp: Lungs CTAB   Equal and adequate air exchange without accessory muscle use   No rales, rhonchi or wheeze  CV: S1S2 irreg/irreg; rate WNL    No murmur   Intact distal pulses   No edema  GI: Abdomen Soft, non tender, non distended   Normoactive bowel sounds  MS: Physiologic ROM of all extremities    Intact distal pulses   No clubbing or cyanosis   Skin Warm and dry; no rash or lesion  Neuro: Alert and oriented; normal and intact dtr's   Psych: Euthymic mood, congruent affect       Assessment (including specific orders/meds/labs):      Jimenez Guaman was seen today for atrial fibrillation.     Diagnoses and all orders for this visit:    Controlled type 2 diabetes mellitus without complication, unspecified whether long term insulin use ([facility]    Medication management  -     lisinopril-hydrochlorothiazide (PRINZIDE;ZESTORETIC) 20-25 MG per tablet; Take 1 tablet by mouth daily    Persistent atrial fibrillation (HCC)    Gastroesophageal reflux disease with esophagitis    Fatigue, unspecified type    Screening for AAA (abdominal aortic aneurysm)  -     US ABDOMINAL AORTA LIMITED; Future        Plan:         Counseled regarding above diagnosis, including possible risks and complications,  especially if left uncontrolled. Counseled regarding the possible sideeffects, risks, benefits and alternatives to treatment; patient and/or guardian verbalizes understanding, agrees, feels comfortable with and wishes to proceed with above treatment plan. Defer management to EP. He'll need to discuss with them about the possibility of getting off meds down the road. For now he remains in A. fib and should continue on both rate controlling medications as well as anticoagulation  Diabetes is stable. Continue current regimen  GERD is stable, continue current regimen  Discussed screening for AAA. He is agreeable    Call or go to ED immediately if symptoms worsen or persist. Advised patient to call with any new medication issues, and, as applicable, read all Rx info from pharmacy to assure aware ofall possible risks and side effects of medication before taking. As applicable, educational materials and/or home exercises printed forpatient's review and were included in patient instructions on his/her After Visit Summary and given to patient at the end of visit. Patient and/or guardian given opportunity to ask questions/raise concerns. He and his wife verbalized comfort and understanding of instructions. Follow Up:     Return in about 6 months (around 10/24/2019), or if symptoms worsen or fail to improve, for DM check, HTN, AWV with me, 30 min.  or sooner if the above issues change unexpectedly or new issues develop     This document was prepared at least

## 2019-04-26 ASSESSMENT — ENCOUNTER SYMPTOMS
WHEEZING: 0
VOICE CHANGE: 0
COUGH: 0
CONSTIPATION: 0
SHORTNESS OF BREATH: 0
ABDOMINAL PAIN: 0
BLOOD IN STOOL: 0
DIARRHEA: 0
TROUBLE SWALLOWING: 0

## 2019-04-26 NOTE — PROGRESS NOTES
[address] [facility] Electrophysiology  Progress note  Patient: Lilly Escobar  Medical Record Number: [id number]  Date of Procedure:  4/30/2019  Attending Electrophysiologist: Rosalee Ramirez DO  Referring Physician: Fern Bolton MD     CHIEF COMPLAINT: persistent atrial fibrillation    S: Mr. Socorro Zarate is present today for a follow up. Mr. Socorro Zarate previously followed with Dr. Jerald Boykin and wishes to follow with me since Dr. Lauro Weinberg  departure. Mr. Socorro Zarate, prior to Dr. Lauro Weinberg departure, had a successful cardioversion on 9/7/18 - he opted not to start on any AAD therapy at that time. Today, he is in atrial fibrillation with controlled ventricular response. He does admit to symptoms of dyspnea on exertion but denies any overt heart failure symptoms. He denies any chest pain, shortness of breath, orthopnea or PND. He denies any feelings of palpitations or rapid heart rhythms.     Patient Active Problem List    Diagnosis Date Noted    Hypokalemia 12/05/2018    Hypomagnesemia 12/05/2018    Typical atrial flutter ([address] 08/02/2018    S/P ablation of atrial flutter 08/02/2018    S/P ablation of atrial fibrillation 08/01/2018    Persistent atrial fibrillation ([address] 06/06/2018    Sleep-disordered breathing 06/06/2018    Class 1 obesity due to excess calories with serious comorbidity and body mass index (BMI) of 32.0 to 32.9 in adult 06/06/2018    Fatigue 04/02/2015    Erectile dysfunction 05/06/2012    Diverticulosis     Cholelithiasis     Diabetes type 2, controlled ([address]     Allergic rhinitis     Hyperlipidemia     Essential hypertension     GERD (gastroesophageal reflux disease)        Past Medical History:   Diagnosis Date    A-fib [facility])     follows with Dr. Olinda Menard Allergic rhinitis     Cholelithiasis     noted on 3/11 CT    COPD (chronic obstructive pulmonary disease) ([address]     early stages    Diabetes type 2, controlled ([address]     Diverticulosis     noted on 3/11 CT    GERD (gastroesophageal reflux disease)     Hyperlipidemia     Hypertension      Family History   Problem Relation Age of Onset    Dementia Mother     Heart Disease Father     Other Brother         elevated PSA    Cancer Brother         T cell lymphoma      There is no family history of sudden cardiac arrest    Social History     Tobacco Use    Smoking status: Former Smoker     Packs/day: 1.50     Years: 15.00     Pack years: 22.50     Types: Cigarettes     Last attempt to quit: 1997     Years since quittin.0    Smokeless tobacco: Never Used   Substance Use Topics    Alcohol use: Yes     Comment: moderate alcohol use / drinks 12 beers or shots liquor per week     Current Outpatient Medications   Medication Sig Dispense Refill    potassium chloride (KLOR-CON M) 20 MEQ extended release tablet TAKE ONE TABLET BY MOUTH DAILY 30 tablet 2    lisinopril-hydrochlorothiazide (PRINZIDE;ZESTORETIC) 20-25 MG per tablet Take 1 tablet by mouth daily 90 tablet 3    metFORMIN (GLUCOPHAGE) 500 MG tablet Take 1 tablet by mouth 2 times daily (with meals) 60 tablet 5    atorvastatin (LIPITOR) 80 MG tablet TAKE ONE TABLET BY MOUTH DAILY 90 tablet 2    magnesium oxide (MAG-OX) 400 (240 Mg) MG tablet Take 1 tablet by mouth 2 times daily 60 tablet 5    apixaban (ELIQUIS) 5 MG TABS tablet Take 1 tablet by mouth 2 times daily 180 tablet 3    metoprolol succinate (TOPROL XL) 25 MG extended release tablet TAKE ONE TABLET BY MOUTH EVERY  tablet 3    esomeprazole (NEXIUM) 40 MG delayed release capsule Take 40 mg by mouth daily Instructed to take morning of surgery with a sip of water      ONETOUCH DELICA LANCETS 46W MISC testing once daily 100 each 4    ONE TOUCH ULTRA TEST strip TEST ONE TIME DAILY  100 strip 5     No current facility-administered medications for this visit. No Known Allergies    ROS:   Constitutional: Negative for fever, + activity change and - appetite change.    HENT: Negative for epistaxis. Eyes: Negative for diploplia, blurred vision. Respiratory: Negative for cough, chest tightness, shortness of breath and wheezing. Cardiovascular: pertinent positives in HPI  Gastrointestinal: Negative for abdominal pain and blood in stool. All other review of systems are negative     PHYSICAL EXAM:  Vitals:    19 0947   BP: 126/80   Pulse: 82   Resp: 18   Weight: 210 lb (95.3 kg)   Height: 5' 9\" (1.753 m)     Constitutional: Oriented to person, place, and time. Well-developed and cooperative. Head: Normocephalic and atraumatic. Eyes: Conjunctivae are normal. Pupils are equal, round, and reactive to light. Neck: No hepatojugular reflux and no JVD present. Carotid bruit is not present. Cardiovascular: S1 normal, S2 normal and intact distal pulses. An irregular rhythm present. PMI is not displaced. Pulmonary/Chest: Effort normal and breath sounds normal. No respiratory distress. Abdominal: Soft. Normal appearance and bowel sounds are normal.  No abdominal bruit and no pulsatile midline mass. There is no hepatomegaly. No tenderness. Musculoskeletal: Normal range of motion of all extremities, no muscle weakness. Neurological: Alert and oriented to person, place, and time. Gait normal.   Skin: Skin is warm and dry. No bruising, no ecchymosis and no rash noted. Extremity: No clubbing or cyanosis. No edema. Psychiatric: Normal mood and affect. Thought content normal.    Data:    No results for input(s): WBC, HGB, HCT, PLT in the last 72 hours. No results for input(s): NA, K, CL, CO2, BUN, CREATININE, CALCIUM in the last 72 hours. Invalid input(s): GLU  No results for input(s): INR in the last 72 hours. No results for input(s): TSH in the last 72 hours.   Lab Results   Component Value Date    MG 1.4 2018       EK19: Atrial fibrillation with a CVR, NAD, no acute ST changes    Last Echo: 18  Details     Reading Physician Reading Date Result 6401 Ollie Wisdom MD 5/17/2018    Narrative     Transthoracic Echocardiography Report (TTE)     Demographics      Patient Name         [facility] Gender                Male      Medical Record       [id number]     Room Number   Number      Account #           [de-identified]    Procedure Date        05/17/2018      Corporate ID                      Ordering Physician   Esvin Wallace MD      Accession Number     [id number]    Referring Physician   Esvin Moseley Aricanderson      Date of Birth        1950   Sonographer           Chavo Alfaro RDCS      Age                  68 year(s)   Interpreting         Esvin Wallace MD                                     Physician                                        Any Other     Procedure    Type of Study      TTE procedure:Echo Complete W/ Dop & Color Flow.     Procedure Date  Date: 05/17/2018 Start: 08:55 AM    Study Location: Echo Lab  Technical Quality: Adequate visualization    Indications:Atrial fibrillation.     Patient Status: Routine    Height: 69 inches Weight: 218 pounds BSA: 2.14 m^2 BMI: 32.19 kg/m^2    BP: 114/74 mmHg     Findings      Left Ventricle   Normal left ventricle size.   Normal left ventricle wall thickness.   No wall motion abnormalities.   Normal diastolic function.   Ejection fraction is visually estimated at 55-65%.      Right Ventricle   Normal right ventricular size and function.      Left Atrium   Left atrium is of normal size.   Atrial fibrillation      Right Atrium   Normal right atrium size.      Mitral Valve   Normal mitral valve structure and function.   Physiologic and/or trace mitral regurgitation is present.      Tricuspid Valve   Normal tricuspid valve structure and function.      Aortic Valve   Aortic valve opens well.   The aortic valve is trileaflet.   The aortic valve appears mildly sclerotic.      Pulmonic Valve   The pulmonic valve was not well visualized.      Pericardial Effusion   No evidence of pericardial effusion.      Aorta   Aortic root dimension within normal limits.      Conclusions      Summary   Normal diastolic function.   Ejection fraction is visually estimated at 55-65%.   Left atrium is of normal size.   Atrial fibrillation   Normal mitral valve structure and function.   The aortic valve is trileaflet.   The aortic valve appears mildly sclerotic.   No evidence of pericardial effusion.      Signature      ----------------------------------------------------------------   Electronically signed by Dale Duke MD(Interpreting   physician) on 05/17/2018 07:35 PM   ----------------------------------------------------------------     M-Mode/2D Measurements & Calculations      LV Diastolic    LV Systolic Dimension: 2.8   AV Cusp Separation: 1.9 cmLA   Dimension: 4.1  cm                           Dimension: 4.2 cmAO Root   cm              LV Volume Diastolic: 13.2 ml Dimension: 2.9 cm   LV FS:31.7 %    LV Volume Systolic: 76.0 ml   LV PW           LV EDV/LV EDV Index: 34.9   Diastolic: 1.2  PE/23 EH/J^6ZO ESV/LV ESV   cm              Index: 29.4 ml/14ml/ m^2     RV Diastolic Dimension: 2.6   LV PW Systolic: EF Calculated: 51.3 %        cm   1.5 cm          LV Mass Index: 80 l/min*m^2   Septum          LV Length: 8.1 cm            LA/Aorta: 2   Diastolic: 1.2   cm              LVOT: 2.3 cm                 LA volume/Index: 58.7 ml   Septum                                       /80LV/O^5   Systolic: 1.4                                RA Area: 16.7 cm^2   cm      LV Mass: 171.75   g     Doppler Measurements & Calculations      MV Peak E-Wave: 1.57 AV Peak Velocity: 1.32 LVOT Peak Velocity: 0.95 m/s   m/s                  m/s                    LVOT Mean Velocity: 0.73 m/s                        AV Peak Gradient: 6.95 LVOT Peak Gradient: 3.6   MV Peak Gradient:    mmHg                   mmHgLVOT Mean Gradient: 2.3   9.9 mmHg             AV Mean Velocity: 0.95 mmHg   MV Mean Gradient:    m/s   4.3 mmHg             AV Mean Gradient: 4   MV Mean Velocity:    mmHg   0.92 m/s             AV VTI: 27.2 cm   MV Deceleration      AV Area   Time: 152.6 msec     (Continuity):3.42 cm^2 PV Peak Velocity: 1.21 m/s   MV P1/2t: 64.4 msec                         PV Peak Gradient: 5.88 mmHg   MVA by PHT:3.42 cm^2 LVOT VTI: 22.4 cm      PV Mean Velocity: 0.91 m/s   MV Area                                     PV Mean Gradient: 3.6 mmHg   (continuity): 3.4   cm^2             Last Stress Test: 18  Paola Nichole MD     2018  6:45 PM  [facility] [address] [place] [address]  [phone]                Pharmacologic Stress Nuclear Gated SPECT Study    Name: [facility] Account Number: [de-identified]    :  [id number]          Sex: male         Date of Study:  2018    Height: 5' 9\" (175.3 cm)         Weight: 218 lb (98.9 kg)     Ordering Provider: Sarai Deshpande. Tamera Cordoba MD          PCP: Janice Alexandre MD      Cardiologist: Sarai Deshpande. Tamera Cordoba MD                Interpreting Physician:Oc Cordoba MD      __________________________________________________________________  _______________    Indication:   Detecting the presence and location of coronary artery disease    Clinical History:   Patient has no known history of coronary   artery disease. Resting ECG:    AZ intsec, QRS int0.08 sec, QT int sec; HR 70 bpm  Atrial fibrillation with controlled ventricular response    Procedure:   Pharmacologic stress testing was performed with regadenoson 0.4   mg for 15 seconds.  Additionally, low-level exercise was performed   along with the infusion.  The heart rate was 70 at baseline and   jonny to 123 beats during the infusion.  This corresponds to 80%   of maximum predicted heart rate.  The blood pressure at baseline   was 110/70 and blood pressure at the end of one 9/7/18  - AF ablation with PVI + CTI + Roof line 8/1/2018- difficulty with R vein isolation, incomplete  - ERAF post ablation- discussed DCCV +/- AAD therapy  - we had an extensive discussion regarding options between rate and rhythm control and he is currently using Toprol for rate control  - we had an extensive discussion regarding all 2ndary causes of AFib which include but are not limited to the following and then need for lifestyle modifications and stringent control of contributing medical conditions which include  - We recommended aggressive risk factor modification as previously discussed  - he has lost weight and is doing well with this  - his sleep study is negative for CATRINA   - successful cardioversion on 9/7/18   - Currently in atrial fibrillation with controlled ventricular response    2. Hyperlipidemia  - managed by PCP  - on lipitor    3. HTN  - well controlled at this visit   - continue current medications    4. DM  - managed by PCP  - on Januvia  - states blood sugars are well controlled  Lab Results   Component Value Date    LABA1C 7.3 (H) 04/10/2019     No results found for: EAG  - see #1    5. GERD  - nexium    6. Diverticulosis  - managed by PCP    7. Obesity  - losing weight, doing well  - Body mass index is 31.01 kg/m². Plan:   1. Today, we discussed further treatment options of his recurrent atrial fibrillation. This included the initiation of antiarrhythmic drug therapy. Given his previous normal LV function and wall thickness as well as negative stress test, we discussed Class 1C as well as Class III antiarrhythmic medications. We discussed that we will check the cost of the medication for him but I have also asked him to repeat an echocardiogram for reevaluation of LV function/RV function/wall thickness/left atrial size. If a 1C agent was used, then we can plan to initiated as an outpatient followed by an ECG in 5-7 days.  If he remains in atrial fibrillation then we will plan for cardioversion. If a class III antiarrhythmic drug is used, then this would require hospitalization for 3-3-1/2 days. At this point I would not pursue a repeat ablative procedure given the difficulty in an isolating the right inferior vein. 2. He remains on Eliquis for oral anticoagulation and has not missed any doses. 3. No changes were made in his medications today. I have spent a total of 25 minutes with the patient and his family reviewing the above stated recommendations. A total of >50% of that time involved face-to-face time providing counseling and or coordination of care with the other providers. Thank you for allowing me to participate in your patient's care. Please call me if there are any questions. Sravanthi Coon DO  [facility] Cardiac Electrophysiology  [address] [facility]    NOTE: This report was transcribed using voice recognition software. Every effort was made to ensure accuracy; however, inadvertent computerized transcription errors may be present.

## 2019-04-30 ENCOUNTER — TELEPHONE (OUTPATIENT)
Dept: NON INVASIVE DIAGNOSTICS | Age: 69
End: 2019-04-30

## 2019-04-30 ENCOUNTER — OFFICE VISIT (OUTPATIENT)
Dept: NON INVASIVE DIAGNOSTICS | Age: 69
End: 2019-04-30
Payer: MEDICARE

## 2019-04-30 VITALS
HEART RATE: 82 BPM | RESPIRATION RATE: 18 BRPM | HEIGHT: 69 IN | DIASTOLIC BLOOD PRESSURE: 80 MMHG | BODY MASS INDEX: 31.1 KG/M2 | SYSTOLIC BLOOD PRESSURE: 126 MMHG | WEIGHT: 210 LBS

## 2019-04-30 DIAGNOSIS — I48.19 PERSISTENT ATRIAL FIBRILLATION (HCC): Primary | ICD-10-CM

## 2019-04-30 DIAGNOSIS — R06.02 SHORTNESS OF BREATH: ICD-10-CM

## 2019-04-30 PROCEDURE — 99214 OFFICE O/P EST MOD 30 MIN: CPT | Performed by: INTERNAL MEDICINE

## 2019-04-30 PROCEDURE — 93000 ELECTROCARDIOGRAM COMPLETE: CPT | Performed by: INTERNAL MEDICINE

## 2019-04-30 NOTE — TELEPHONE ENCOUNTER
Patient has [employer] home delivery. Rythmol is not covered we can call [phone] to start an auth. Flecainide 30 day $15.00 and 90 day $30.00. Tikosyn 30 day $95.00 and 90 day $180.00.

## 2019-05-01 NOTE — TELEPHONE ENCOUNTER
Thanks--if we can let the patient know the cost.  I assume he will start flecainide but I would wait until after his repeat echo to start it.       ALK

## 2019-05-13 ENCOUNTER — HOSPITAL ENCOUNTER (OUTPATIENT)
Dept: CARDIOLOGY | Age: 69
Discharge: HOME | End: 2019-05-13
Payer: MEDICARE

## 2019-05-13 DIAGNOSIS — R06.02 SHORTNESS OF BREATH: ICD-10-CM

## 2019-05-13 LAB
LV EF: 60 %
LVEF MODALITY: NORMAL

## 2019-05-13 PROCEDURE — 93306 TTE W/DOPPLER COMPLETE: CPT

## 2019-05-15 ENCOUNTER — TELEPHONE (OUTPATIENT)
Dept: NON INVASIVE DIAGNOSTICS | Age: 69
End: 2019-05-15

## 2019-05-15 RX ORDER — FLECAINIDE ACETATE 100 MG/1
100 TABLET ORAL 2 TIMES DAILY
Qty: 60 TABLET | Refills: 5 | Status: SHIPPED | OUTPATIENT
Start: 2019-05-15 | End: 2019-06-18

## 2019-05-15 RX ORDER — FLECAINIDE ACETATE 100 MG/1
100 TABLET ORAL 2 TIMES DAILY
COMMUNITY
End: 2019-05-15 | Stop reason: SDUPTHER

## 2019-05-15 NOTE — TELEPHONE ENCOUNTER
----- Message from Deborah Terrell DO sent at 5/14/2019  8:19 PM EDT -----  Please let him know that his echo looks fine. I would start flecainide 100 mg BID and then plan to repeat ECG on Monday or Tuesday of next week. If he remains in AF then we will plan for CV.   Thx.

## 2019-05-15 NOTE — TELEPHONE ENCOUNTER
I spoke with Newton Adorno and reviewed the results and recommendations per Dr Amos Gaitan. I confirmed he has not missed any doses of Eliquis over the past month and reminded him to not miss any doses in the event we need to go forward with CV. Pt verbalized understanding. He is scheduled to come in for ECG 05/20. Rx sent to pharmacy.

## 2019-05-17 ENCOUNTER — HOSPITAL ENCOUNTER (OUTPATIENT)
Dept: ULTRASOUND IMAGING | Age: 69
Discharge: HOME | End: 2019-05-19
Payer: MEDICARE

## 2019-05-17 DIAGNOSIS — Z13.6 SCREENING FOR AAA (ABDOMINAL AORTIC ANEURYSM): ICD-10-CM

## 2019-05-17 PROCEDURE — 76775 US EXAM ABDO BACK WALL LIM: CPT

## 2019-05-20 ENCOUNTER — NURSE ONLY (OUTPATIENT)
Dept: NON INVASIVE DIAGNOSTICS | Age: 69
End: 2019-05-20
Payer: MEDICARE

## 2019-05-20 ENCOUNTER — TELEPHONE (OUTPATIENT)
Dept: NON INVASIVE DIAGNOSTICS | Age: 69
End: 2019-05-20

## 2019-05-20 DIAGNOSIS — I48.19 PERSISTENT ATRIAL FIBRILLATION (HCC): Primary | ICD-10-CM

## 2019-05-20 DIAGNOSIS — Z79.899 ENCOUNTER FOR MONITORING FLECAINIDE THERAPY: ICD-10-CM

## 2019-05-20 DIAGNOSIS — Z51.81 ENCOUNTER FOR MONITORING FLECAINIDE THERAPY: ICD-10-CM

## 2019-05-20 PROCEDURE — 93000 ELECTROCARDIOGRAM COMPLETE: CPT | Performed by: INTERNAL MEDICINE

## 2019-05-20 RX ORDER — MELATONIN 10 MG
CAPSULE ORAL NIGHTLY
COMMUNITY
End: 2021-10-14

## 2019-05-20 NOTE — PROGRESS NOTES
Ekg performed today per Dr. Tyler Barbosa. S/p Flecainide initiation on 5/16/19 and echo on 5/13/19. Medications were reviewed. Patient is still in atrial fibrillation. A cardioversion was scheduled on 5/23/19. Instructions given by Kylie, patient verbalized understanding.

## 2019-05-22 ENCOUNTER — TELEPHONE (OUTPATIENT)
Dept: CARDIAC CATH/INVASIVE PROCEDURES | Age: 69
End: 2019-05-22

## 2019-05-23 ENCOUNTER — ANESTHESIA (OUTPATIENT)
Dept: CARDIAC CATH/INVASIVE PROCEDURES | Age: 69
End: 2019-05-23

## 2019-05-23 ENCOUNTER — HOSPITAL ENCOUNTER (OUTPATIENT)
Dept: CARDIAC CATH/INVASIVE PROCEDURES | Age: 69
Discharge: HOME | End: 2019-05-23
Payer: MEDICARE

## 2019-05-23 ENCOUNTER — ANESTHESIA EVENT (OUTPATIENT)
Dept: CARDIAC CATH/INVASIVE PROCEDURES | Age: 69
End: 2019-05-23

## 2019-05-23 VITALS
HEART RATE: 66 BPM | OXYGEN SATURATION: 99 % | RESPIRATION RATE: 20 BRPM | WEIGHT: 205 LBS | HEIGHT: 69 IN | BODY MASS INDEX: 30.36 KG/M2 | DIASTOLIC BLOOD PRESSURE: 78 MMHG | SYSTOLIC BLOOD PRESSURE: 134 MMHG

## 2019-05-23 VITALS — SYSTOLIC BLOOD PRESSURE: 130 MMHG | OXYGEN SATURATION: 98 % | DIASTOLIC BLOOD PRESSURE: 76 MMHG

## 2019-05-23 LAB
ANION GAP SERPL CALCULATED.3IONS-SCNC: 9 MMOL/L (ref 7–16)
BASOPHILS ABSOLUTE: 0.05 E9/L (ref 0–0.2)
BASOPHILS RELATIVE PERCENT: 0.8 % (ref 0–2)
BUN BLDV-MCNC: 13 MG/DL (ref 8–23)
CALCIUM SERPL-MCNC: 9.7 MG/DL (ref 8.6–10.2)
CHLORIDE BLD-SCNC: 99 MMOL/L (ref 98–107)
CO2: 32 MMOL/L (ref 22–29)
CREAT SERPL-MCNC: 1.3 MG/DL (ref 0.7–1.2)
EKG ATRIAL RATE: 45 BPM
EKG ATRIAL RATE: 56 BPM
EKG P AXIS: 63 DEGREES
EKG P-R INTERVAL: 254 MS
EKG Q-T INTERVAL: 412 MS
EKG Q-T INTERVAL: 450 MS
EKG QRS DURATION: 112 MS
EKG QRS DURATION: 120 MS
EKG QTC CALCULATION (BAZETT): 434 MS
EKG QTC CALCULATION (BAZETT): 447 MS
EKG R AXIS: 110 DEGREES
EKG R AXIS: 80 DEGREES
EKG T AXIS: 51 DEGREES
EKG T AXIS: 55 DEGREES
EKG VENTRICULAR RATE: 56 BPM
EKG VENTRICULAR RATE: 71 BPM
EOSINOPHILS ABSOLUTE: 0.37 E9/L (ref 0.05–0.5)
EOSINOPHILS RELATIVE PERCENT: 6.1 % (ref 0–6)
GFR AFRICAN AMERICAN: >60
GFR NON-AFRICAN AMERICAN: 55 ML/MIN/1.73
GLUCOSE BLD-MCNC: 152 MG/DL (ref 74–99)
HCT VFR BLD CALC: 42.3 % (ref 37–54)
HEMOGLOBIN: 14 G/DL (ref 12.5–16.5)
IMMATURE GRANULOCYTES #: 0.03 E9/L
IMMATURE GRANULOCYTES %: 0.5 % (ref 0–5)
LYMPHOCYTES ABSOLUTE: 1.49 E9/L (ref 1.5–4)
LYMPHOCYTES RELATIVE PERCENT: 24.5 % (ref 20–42)
MCH RBC QN AUTO: 28.8 PG (ref 26–35)
MCHC RBC AUTO-ENTMCNC: 33.1 % (ref 32–34.5)
MCV RBC AUTO: 87 FL (ref 80–99.9)
METER GLUCOSE: 133 MG/DL (ref 74–99)
MONOCYTES ABSOLUTE: 0.76 E9/L (ref 0.1–0.95)
MONOCYTES RELATIVE PERCENT: 12.5 % (ref 2–12)
NEUTROPHILS ABSOLUTE: 3.39 E9/L (ref 1.8–7.3)
NEUTROPHILS RELATIVE PERCENT: 55.6 % (ref 43–80)
PDW BLD-RTO: 12.8 FL (ref 11.5–15)
PLATELET # BLD: 178 E9/L (ref 130–450)
PMV BLD AUTO: 10.4 FL (ref 7–12)
POTASSIUM SERPL-SCNC: 4.6 MMOL/L (ref 3.5–5)
RBC # BLD: 4.86 E12/L (ref 3.8–5.8)
SODIUM BLD-SCNC: 140 MMOL/L (ref 132–146)
WBC # BLD: 6.1 E9/L (ref 4.5–11.5)

## 2019-05-23 PROCEDURE — 3700000001 HC ADD 15 MINUTES (ANESTHESIA)

## 2019-05-23 PROCEDURE — 93010 ELECTROCARDIOGRAM REPORT: CPT | Performed by: INTERNAL MEDICINE

## 2019-05-23 PROCEDURE — 2580000003 HC RX 258: Performed by: INTERNAL MEDICINE

## 2019-05-23 PROCEDURE — 92960 CARDIOVERSION ELECTRIC EXT: CPT | Performed by: INTERNAL MEDICINE

## 2019-05-23 PROCEDURE — 85025 COMPLETE CBC W/AUTO DIFF WBC: CPT

## 2019-05-23 PROCEDURE — 2709999900 HC NON-CHARGEABLE SUPPLY

## 2019-05-23 PROCEDURE — 2580000003 HC RX 258: Performed by: ANESTHESIOLOGIST ASSISTANT

## 2019-05-23 PROCEDURE — 6360000002 HC RX W HCPCS: Performed by: ANESTHESIOLOGIST ASSISTANT

## 2019-05-23 PROCEDURE — 80048 BASIC METABOLIC PNL TOTAL CA: CPT

## 2019-05-23 PROCEDURE — 82962 GLUCOSE BLOOD TEST: CPT

## 2019-05-23 PROCEDURE — 36415 COLL VENOUS BLD VENIPUNCTURE: CPT

## 2019-05-23 PROCEDURE — 93005 ELECTROCARDIOGRAM TRACING: CPT | Performed by: INTERNAL MEDICINE

## 2019-05-23 PROCEDURE — 3700000000 HC ANESTHESIA ATTENDED CARE

## 2019-05-23 RX ORDER — SODIUM CHLORIDE 9 MG/ML
INJECTION, SOLUTION INTRAVENOUS CONTINUOUS PRN
Status: DISCONTINUED | OUTPATIENT
Start: 2019-05-23 | End: 2019-05-23 | Stop reason: SDUPTHER

## 2019-05-23 RX ORDER — SODIUM CHLORIDE 9 MG/ML
500 INJECTION, SOLUTION INTRAVENOUS ONCE
Status: COMPLETED | OUTPATIENT
Start: 2019-05-23 | End: 2019-05-23

## 2019-05-23 RX ORDER — SODIUM CHLORIDE 0.9 % (FLUSH) 0.9 %
10 SYRINGE (ML) INJECTION PRN
OUTPATIENT
Start: 2019-05-23

## 2019-05-23 RX ORDER — SODIUM CHLORIDE 0.9 % (FLUSH) 0.9 %
10 SYRINGE (ML) INJECTION EVERY 12 HOURS SCHEDULED
OUTPATIENT
Start: 2019-05-23

## 2019-05-23 RX ORDER — PROPOFOL 10 MG/ML
INJECTION, EMULSION INTRAVENOUS PRN
Status: DISCONTINUED | OUTPATIENT
Start: 2019-05-23 | End: 2019-05-23 | Stop reason: SDUPTHER

## 2019-05-23 RX ADMIN — PROPOFOL 50 MG: 10 INJECTION, EMULSION INTRAVENOUS at 10:53

## 2019-05-23 RX ADMIN — PROPOFOL 50 MG: 10 INJECTION, EMULSION INTRAVENOUS at 10:54

## 2019-05-23 RX ADMIN — SODIUM CHLORIDE 500 ML: 9 INJECTION, SOLUTION INTRAVENOUS at 09:36

## 2019-05-23 RX ADMIN — SODIUM CHLORIDE: 9 INJECTION, SOLUTION INTRAVENOUS at 09:51

## 2019-05-23 RX ADMIN — PROPOFOL 10 MG: 10 INJECTION, EMULSION INTRAVENOUS at 10:55

## 2019-05-23 NOTE — ANESTHESIA PRE PROCEDURE
Department of Anesthesiology  Preprocedure Note       Name:  Natalie Gallardo   Age:  77 y.o.  :  1950                                          MRN:  [id number]         Date:  2019      Surgeon: Calos Khalil  Procedure: DCCV    Medications prior to admission:   Prior to Admission medications    Medication Sig Start Date End Date Taking?  Authorizing Provider   flecainide (TAMBOCOR) 100 MG tablet Take 1 tablet by mouth 2 times daily 5/15/19  Yes Grant Hernandez DO   potassium chloride (KLOR-CON M) 20 MEQ extended release tablet TAKE ONE TABLET BY MOUTH DAILY 19  Yes Oneal Tucker MD   lisinopril-hydrochlorothiazide [facility]) 20-25 MG per tablet Take 1 tablet by mouth daily 19  Yes Oneal Tucker MD   metFORMIN (GLUCOPHAGE) 500 MG tablet Take 1 tablet by mouth 2 times daily (with meals) 19  Yes Oneal Tucker MD   atorvastatin (LIPITOR) 80 MG tablet TAKE ONE TABLET BY MOUTH DAILY 19  Yes Oneal Tucker MD   magnesium oxide (MAG-OX) 400 (240 Mg) MG tablet Take 1 tablet by mouth 2 times daily 18  Yes Oneal Tucker MD   apixaban (ELIQUIS) 5 MG TABS tablet Take 1 tablet by mouth 2 times daily 18  Yes Oneal Tucker MD   metoprolol succinate (TOPROL XL) 25 MG extended release tablet TAKE ONE TABLET BY MOUTH EVERY DAY 18  Yes MD Theresa Uss LANCETS 43B MISC testing once daily 18  Yes Oneal Tucker MD   ONE TOUCH ULTRA TEST strip TEST ONE TIME DAILY  8/10/18  Yes Oneal Tucker MD   esomeprazole (NEXIUM) 40 MG delayed release capsule Take 40 mg by mouth daily Instructed to take morning of surgery with a sip of water   Yes Historical Provider, MD   Melatonin 10 MG CAPS Take by mouth nightly    Historical Provider, MD       Current medications:    Current Outpatient Medications   Medication Sig Dispense Refill    flecainide (TAMBOCOR) 100 MG tablet Take 1 tablet by mouth 2 times daily 60 tablet 5    potassium chloride (KLOR-CON M) 20 MEQ extended release tablet TAKE ONE TABLET BY MOUTH DAILY 30 tablet 2    lisinopril-hydrochlorothiazide (PRINZIDE;ZESTORETIC) 20-25 MG per tablet Take 1 tablet by mouth daily 90 tablet 3    metFORMIN (GLUCOPHAGE) 500 MG tablet Take 1 tablet by mouth 2 times daily (with meals) 60 tablet 5    atorvastatin (LIPITOR) 80 MG tablet TAKE ONE TABLET BY MOUTH DAILY 90 tablet 2    magnesium oxide (MAG-OX) 400 (240 Mg) MG tablet Take 1 tablet by mouth 2 times daily 60 tablet 5    apixaban (ELIQUIS) 5 MG TABS tablet Take 1 tablet by mouth 2 times daily 180 tablet 3    metoprolol succinate (TOPROL XL) 25 MG extended release tablet TAKE ONE TABLET BY MOUTH EVERY  tablet 3    ONETOUCH DELICA LANCETS 26W MISC testing once daily 100 each 4    ONE TOUCH ULTRA TEST strip TEST ONE TIME DAILY  100 strip 5    esomeprazole (NEXIUM) 40 MG delayed release capsule Take 40 mg by mouth daily Instructed to take morning of surgery with a sip of water      Melatonin 10 MG CAPS Take by mouth nightly       No current facility-administered medications for this encounter.         Allergies:  No Known Allergies    Problem List:    Patient Active Problem List   Diagnosis Code    Diabetes type 2, controlled ([facility] [address] E11.9    Allergic rhinitis J30.9    Hyperlipidemia E78.5    Essential hypertension I10    GERD (gastroesophageal reflux disease) K21.9    Diverticulosis K57.90    Cholelithiasis K80.20    Erectile dysfunction N52.9    Fatigue R53.83    Persistent atrial fibrillation (HCC) I48.1    Sleep-disordered breathing G47.30    Class 1 obesity due to excess calories with serious comorbidity and body mass index (BMI) of 32.0 to 32.9 in adult E66.09, Z68.32    S/P ablation of atrial fibrillation Z98.890, Z86.79    Typical atrial flutter (HCC) I48.3    S/P ablation of atrial flutter Z98.890, Z86.79    Hypokalemia E87.6    Hypomagnesemia E83.42       Past Medical History:        Diagnosis Date    Allergic rhinitis     Atrial fibrillation (HCC)     follows with Dr. Josephine Mccormick Cholelithiasis     noted on 3/11 CT    COPD (chronic obstructive pulmonary disease) ([facility] [address]     early stages    Diabetes type 2, controlled ([facility] [address]     Diverticulosis     noted on 3/11 CT    GERD (gastroesophageal reflux disease)     Hyperlipidemia     Hypertension        Past Surgical History:        Procedure Laterality Date    ABLATION OF DYSRHYTHMIC FOCUS  2018    Afib/Aflutter ablation    APPENDECTOMY      COLONOSCOPY         Social History:    Social History     Tobacco Use    Smoking status: Former Smoker     Packs/day: 1.50     Years: 15.00     Pack years: 22.50     Types: Cigarettes     Last attempt to quit: 1997     Years since quittin.1    Smokeless tobacco: Never Used   Substance Use Topics    Alcohol use: Yes     Comment: moderate alcohol use / drinks 12 beers or shots liquor per week                                Counseling given: Not Answered      Vital Signs (Current):   Vitals:    19 0915   BP: 126/86   Pulse: 78   SpO2: 97%   Weight: 205 lb (93 kg)   Height: 5' 9\" (1.753 m)                                              BP Readings from Last 3 Encounters:   19 126/86   19 126/80   19 124/69       NPO Status: > 8 hours                                                                               BMI:   Wt Readings from Last 3 Encounters:   19 205 lb (93 kg)   19 210 lb (95.3 kg)   19 209 lb (94.8 kg)     Body mass index is 30.27 kg/m².     CBC:   Lab Results   Component Value Date    WBC 6.4 04/10/2019    RBC 4.92 04/10/2019    HGB 14.5 04/10/2019    HCT 43.9 04/10/2019    MCV 89.2 04/10/2019    RDW 12.8 04/10/2019     04/10/2019       CMP:   Lab Results   Component Value Date     04/10/2019    K 4.1 04/10/2019    K 4.0 2018    CL 96 04/10/2019    CO2 28 04/10/2019    BUN 15 04/10/2019    CREATININE 1.1 04/10/2019    GFRAA >60 04/10/2019    LABGLOM >60 04/10/2019    GLUCOSE 136 04/10/2019    GLUCOSE 107 01/03/2012    PROT 7.8 04/10/2019    CALCIUM 9.9 04/10/2019    BILITOT 0.6 04/10/2019    ALKPHOS 59 04/10/2019    AST 20 04/10/2019    ALT 22 04/10/2019       POC Tests: No results for input(s): POCGLU, POCNA, POCK, POCCL, POCBUN, POCHEMO, POCHCT in the last 72 hours. Coags:   Lab Results   Component Value Date    PROTIME 16.7 08/02/2018    INR 1.5 08/02/2018    APTT 33.5 07/27/2018       HCG (If Applicable): No results found for: PREGTESTUR, PREGSERUM, HCG, HCGQUANT     ABGs: No results found for: PHART, PO2ART, YSR1BVQ, DRE9OBJ, BEART, D5BSFYQD     Type & Screen (If Applicable):  No results found for: LABABO, LABRH     EKG 5/20/19  A Fib-68 bpm    ECHO: 5/14/19  NL LV  Mild concentric LVH  Stage 1 diastolic dysfunction  EF = 55-65%  LA = mildly dilated  A. Fib.    7/27/2018 [facility] Heart   Impression   1. No abnormality of the left atrium appreciated. Orifice measurements   as described above for the pulmonary veins. Anesthesia Evaluation  Patient summary reviewed and Nursing notes reviewed no history of anesthetic complications:   Airway: Mallampati: II  TM distance: >3 FB   Neck ROM: full  Mouth opening: > = 3 FB Dental:          Pulmonary:   (+) COPD:      Smoker: ex smoker-quit '97. ROS comment: Former smoker - 22.5 pack years, quit in 1997  Allergic rhinitis  Sleep-disordered breathing   Cardiovascular:    (+) hypertension:, dysrhythmias (s/p afib ablation and aflutter ablation, Per patient - ablation 9/1/2018;): atrial fibrillation and atrial flutter, hyperlipidemia      ECG reviewed  Rhythm: irregular  Rate: normal  Echocardiogram reviewed                  Neuro/Psych:               GI/Hepatic/Renal:   (+) GERD: well controlled,          ROS comment: Diverticulitis  cholelithiasis.    Endo/Other:    (+) DiabetesType II DM, well controlled, , .    (-) blood dyscrasia (on Eliquis-last dose 5/23/19)               Abdominal: Vascular:                                        Anesthesia Plan      MAC     ASA 3       Induction: intravenous. Anesthetic plan and risks discussed with patient. Plan discussed with attending.                   Trino Long   5/23/2019      Trino Long   5/23/2019  9:46 AM

## 2019-05-23 NOTE — PROCEDURES
[address]  [place] [address] Electrophysiology  Procedure Report  PATIENT: Lisa Cisneros  MEDICAL RECORD NUMBER: [id number]  DATE OF PROCEDURE:  5/23/2019  ATTENDING ELECTROPHYSIOLOGIST:  Toi Yip MD  REFERRING PHYSICIAN: Dr. Artem Young:    1. Direct Current Electrical Cardioversion    INDICATION:  Persistent atrial fibrillation    PROCEDURE PERFORMED By: Toi Yip MD    PROCEDURE TIME: Twenty minutes    COMPICATIONS: None immediately apparent    ANESTHESIA: LMAC    DESCRIPTION OF PROCEDURE: The risks, benefits, and alternatives to the procedure were discussed with the patient, and informed consent was obtained. The patient was brought to the cardiovascular lab and sedated under the guidance of anesthesia. Once anesthesia was deemed adequate, a 200 J biphasic synchronous shock was applied and failed to restore normal sinus rhythm A repeat 360 J biphasic synchronous shock was applied and successfully restored normal sinus rhythm The patient was then allowed to wake in the usual fashion. Comment: The patient was therapeutically anticoagulated for a minimum of 3 consecutive weeks prior to cardioversion. SUMMARY:  1. Successful cardioversion of persistent atrial fibrillation  to sinus rhythm. RECOMMENDATIONS:  1. Discharge to home when fully awake and alert, if clinically stable. 2. Resume all pre-procedure medications, including anticoagulation. 3. Follow-up in the office in 1 month with Dr. Gamaliel Walter.     Toi Yip MD  Cardiac Electrophysiology  [facility]  [facility] [place]: [facility]  11:02 AM  5/23/2019

## 2019-05-30 ENCOUNTER — PATIENT MESSAGE (OUTPATIENT)
Dept: NON INVASIVE DIAGNOSTICS | Age: 69
End: 2019-05-30

## 2019-05-30 NOTE — TELEPHONE ENCOUNTER
Spoke to Mr. Wan, who is s/p  Flecainide initiation on 5/16/19. He had a followed EKG after starting Flecainide and was noted to be in AF. He was then scheduled for a cardioversion and is s/p successful cardioversion on 5/23/19 w/ Dr. Micah Leary. He reports that he felt overall well after his cardioversion but he mowed his lawn on 5/29/19 and states he knew he went back into AF. He states fatigue was his only symptom - he denies any SOB, palpitations or chest pain. He is questioning if he should continue on Flecainide or if he should stop due to possible breakthrough AF. He continues on Eliquis and denies missing any doses.

## 2019-05-30 NOTE — TELEPHONE ENCOUNTER
From: Feroz Maddox  To: Rebekah Marlow, ROQUE - CNS  Sent: 5/30/2019 11:57 AM EDT  Subject: Non-Urgent Medical Question    Got cardioversion Thursday, mowed grass yesterday and went out of rhythm should I continue tambocor ?

## 2019-05-31 NOTE — TELEPHONE ENCOUNTER
Patient stated that he was on vacation, and was not coming into the office before his appointment on 06/18/2019. He thought it was ridiculous to get an EKG when he knows he is out of rhythm. He was just wanting to get off medication. I told him that we could not give him recommendations without an EKG on file and he said he was not coming into the office.

## 2019-06-10 ENCOUNTER — TELEPHONE (OUTPATIENT)
Dept: NON INVASIVE DIAGNOSTICS | Age: 69
End: 2019-06-10

## 2019-06-10 NOTE — TELEPHONE ENCOUNTER
I emailed Ora Garcia asking him to send an [phone] [place] to my email today.

## 2019-06-10 NOTE — TELEPHONE ENCOUNTER
----- Message from Nelsy Claudio DO sent at 6/9/2019  8:25 AM EDT -----  Just saw note on Mr. Wan that he was back in AF post CV on flecainide. Can you have him send another TUSHARzolesyaan. If he remains in AF, I would try one more CV on flecainide. If recurrence, then would consider changing medications.     ALK

## 2019-06-11 NOTE — TELEPHONE ENCOUNTER
Per Dr. Anatoliy Melton, I informed Clotilde Morton that his [employer] EKG that he sent shows NSR. Clotilde Morton verbalized understanding and will keep his ov with Eber Marroquin next week. He understands that if he goes back into afib then 1 more attempt at a CV while on flecainide per ALK.

## 2019-06-18 ENCOUNTER — OFFICE VISIT (OUTPATIENT)
Dept: NON INVASIVE DIAGNOSTICS | Age: 69
End: 2019-06-18
Payer: MEDICARE

## 2019-06-18 VITALS
HEIGHT: 69 IN | HEART RATE: 66 BPM | DIASTOLIC BLOOD PRESSURE: 60 MMHG | RESPIRATION RATE: 16 BRPM | BODY MASS INDEX: 31.4 KG/M2 | SYSTOLIC BLOOD PRESSURE: 120 MMHG | WEIGHT: 212 LBS

## 2019-06-18 DIAGNOSIS — I48.91 ATRIAL FIBRILLATION STATUS POST CARDIOVERSION (HCC): Primary | ICD-10-CM

## 2019-06-18 DIAGNOSIS — I48.19 PERSISTENT ATRIAL FIBRILLATION (HCC): ICD-10-CM

## 2019-06-18 PROCEDURE — 93000 ELECTROCARDIOGRAM COMPLETE: CPT | Performed by: INTERNAL MEDICINE

## 2019-06-18 PROCEDURE — 99215 OFFICE O/P EST HI 40 MIN: CPT | Performed by: NURSE PRACTITIONER

## 2019-06-18 ASSESSMENT — ENCOUNTER SYMPTOMS
SHORTNESS OF BREATH: 0
CHEST TIGHTNESS: 0

## 2019-06-18 NOTE — PROGRESS NOTES
[address] [facility] Electrophysiology  Progress note  Patient: Rios Rodriguez  Medical Record Number: [id number]  Date of Procedure:  6/18/2019  Attending Electrophysiologist: Abbe Pennington DO  Referring Physician: Gianluca Ro MD     CHIEF COMPLAINT: persistent atrial fibrillation    S: Mr. Keila Fountain is present today for a follow up. Mr. Keila Fountain was noted to be in atrial fibrillation at his OV with Dr Antonella Gross 4/26/19. He had an echocardiogram demonstrating normal LV function and wall thickness as well as previous negative stress test. Flecainide 100 mg BID was initiated. He had not missed any Eliquis doses. He then underwent successful CV on 5/23/19. On 5/30/19 he called the office he felt he was \"out of rhythm\" and requested to come off flecainide. He was asked to have an ECG performed to verify the arrhythmia which he declined since he was on vacation. He sent an [employer] EKG on 6/10/19 and he was in [place] Dr[place]. Recommendation was made by Dr Antonella Gross, if he goes back into AF, attempt one more CV while on flecainide. He presents for follow-up accompanied by his wife. He is in sinus rhythm today with a HR of 66 bpm. He reports multiple episodes of recurrent atrial fibrillation since the initiation of flecainide and has requested to discontinue the medication since it is \"not working. \" We discussed, at length, the presentation in sinus rhythm, purpose of BB/flecainide combination for rhythm/rate control, role of BB therapy, and other AAD therapy choices (sotalol/Tikosyn) as previously discussed with Dr Antonella Gross. He is leaving for vacation Friday and will return in early July. He is considering elective admission for Tikosyn initation upon his return. We discussed his out-of-pocket Tikosyn cost 30 day $95.00 and 90 day $180.00 (4/30/19 encounter). He also discussed staying only on [address] [place] [address] for stroke risk reduction and Toprol XL. He will call our office when he returns.  He denies angina, dyspnea, syncope, orthopnea and PND.        Patient Active Problem List    Diagnosis Date Noted    Hypokalemia 2018    Hypomagnesemia 2018    Typical atrial flutter ([address] 2018    S/P ablation of atrial flutter 2018    S/P ablation of atrial fibrillation 2018    Persistent atrial fibrillation ([address] 2018    Sleep-disordered breathing 2018    Class 1 obesity due to excess calories with serious comorbidity and body mass index (BMI) of 32.0 to 32.9 in adult 2018    Fatigue 2015    Erectile dysfunction 2012    Diverticulosis     Cholelithiasis     Diabetes type 2, controlled ([address]     Allergic rhinitis     Hyperlipidemia     Essential hypertension     GERD (gastroesophageal reflux disease)        Past Medical History:   Diagnosis Date    Allergic rhinitis     Atrial fibrillation (HCC)     follows with Dr. Colleen Walker Cholelithiasis     noted on 3/11 CT    COPD (chronic obstructive pulmonary disease) ([address]     early stages    Diabetes type 2, controlled ([address]     Diverticulosis     noted on 3/11 CT    GERD (gastroesophageal reflux disease)     Hyperlipidemia     Hypertension      Family History   Problem Relation Age of Onset    Dementia Mother     Heart Disease Father     Other Brother         elevated PSA    Cancer Brother         T cell lymphoma      There is no family history of sudden cardiac arrest    Social History     Tobacco Use    Smoking status: Former Smoker     Packs/day: 1.50     Years: 15.00     Pack years: 22.50     Types: Cigarettes     Last attempt to quit: 1997     Years since quittin.2    Smokeless tobacco: Never Used   Substance Use Topics    Alcohol use: Yes     Comment: moderate alcohol use / drinks 12 beers or shots liquor per week     Current Outpatient Medications   Medication Sig Dispense Refill    Melatonin 10 MG CAPS Take by mouth nightly      potassium chloride (KLOR-CON M) 20 MEQ extended release tablet EDV Index: 81.5   Diastolic: 1.2  OC/36 /H^9LI ESV/LV ESV   cm              Index: 29.4 ml/14ml/ m^2     RV Diastolic Dimension: 2.6   LV PW Systolic: EF Calculated: 33.6 %        cm   1.5 cm          LV Mass Index: 80 l/min*m^2   Septum          LV Length: 8.1 cm            LA/Aorta: 2   Diastolic: 1.2   cm              LVOT: 2.3 cm                 LA volume/Index: 58.7 ml   Septum                                       /34JT/L^4   Systolic: 1.4                                RA Area: 16.7 cm^2   cm      LV Mass: 171.75   g     Doppler Measurements & Calculations      MV Peak E-Wave: 1.57 AV Peak Velocity: 1.32 LVOT Peak Velocity: 0.95 m/s   m/s                  m/s                    LVOT Mean Velocity: 0.73 m/s                        AV Peak Gradient: 6.95 LVOT Peak Gradient: 3.6   MV Peak Gradient:    mmHg                   mmHgLVOT Mean Gradient: 2.3   9.9 mmHg             AV Mean Velocity: 0.95 mmHg   MV Mean Gradient:    m/s   4.3 mmHg             AV Mean Gradient: 4   MV Mean Velocity:    mmHg   0.92 m/s             AV VTI: 27.2 cm   MV Deceleration      AV Area   Time: 152.6 msec     (Continuity):3.42 cm^2 PV Peak Velocity: 1.21 m/s   MV P1/2t: 64.4 msec                         PV Peak Gradient: 5.88 mmHg   MVA by PHT:3.42 cm^2 LVOT VTI: 22.4 cm      PV Mean Velocity: 0.91 m/s   MV Area                                     PV Mean Gradient: 3.6 mmHg   (continuity): 3.4   cm^2             Last Stress Test: 18  Paola Mcdaniel MD     2018  6:45 PM           [facility] [place] [address]  [phone]                Pharmacologic Stress Nuclear Gated SPECT Study    Name: [facility] Account Number: [de-identified]    :  1950          Sex: male         Date of Study:  2018    Height: 5' 9\" (175.3 cm)         Weight: 218 lb (98.9 kg)     Ordering Provider: Mansi Arellano MD          PCP: Alma Rod MD      Cardiologist: Julio Ryder. Dianna Blackwell MD                Interpreting Physician:Oc Blackwell MD      __________________________________________________________________  _______________    Indication:   Detecting the presence and location of coronary artery disease    Clinical History:   Patient has no known history of coronary   artery disease. Resting ECG:    CA intsec, QRS int0.08 sec, QT int sec; HR 70 bpm  Atrial fibrillation with controlled ventricular response    Procedure:   Pharmacologic stress testing was performed with regadenoson 0.4   mg for 15 seconds. Additionally, low-level exercise was performed   along with the infusion.  The heart rate was 70 at baseline and   jonny to 123 beats during the infusion.  This corresponds to 80%   of maximum predicted heart rate.  The blood pressure at baseline   was 110/70 and blood pressure at the end of infusion was 116/70.    Blood pressure response was normal during the stress procedure. The patient experienced mild shortness of breath, leg heaviness,   and headache during the infusion. ECG during the infusion did not change. IMAGING: Myocardial perfusion imaging was performed at rest 30-35   minutes following the intravenous injection of 10.1 mCi of   (Tc-Sestamibi) followed by 10 ml of Normal Saline.  As per   infusion protocol, the patient was injected intravenously with   31.7 mCi of (Tc-Sestamibi) followed by 10 ml of Normal Saline.    Gated post-stress tomographic imaging was performed 20-25 minutes   after stress. FINDINGS: The overall quality of the study was excellent. Left ventricular cavity size was noted to be normal.    Rotational analog analysis demonstrated no patient motion or   abnormal extracardiac radioactivity. The gated SPECT stress imaging in the short, vertical long, and   horizontal long axis demonstrated normal homogeneous tracer   distribution throughout the myocardium.       The resting images show no change. Gated SPECT left ventricular ejection fraction was calculated to   be 82%, with normal myocardial thickening and wall motion. Impression:    1. ECG during the infusion did not change. 2. The myocardial perfusion imaging was normal.    3. Overall left ventricular systolic function was normal without   regional wall motion abnormalities. 4. Low risk  pharmacologic stress test.    Thank you for sending your patient to this [employer]. Electronically signed by Aki Cook MD on 5/17/18 at 6:45   PM              I have independently reviewed all of the ECGs and rhythm strips per above     Assessment:     1. Persistent atrial fibrillation, likely LSPAF  - CHADSVASC= 3 (HTN, DM, Age)  - their current [address] regiment includes Eliquis  - AF ablation with PVI + CTI + Roof line 8/1/2018- difficulty with R vein isolation, incomplete  - ERAF post ablation- discussed DCCV +/- AAD therapy  - we had an extensive discussion regarding options between rate and rhythm control and he is currently using Toprol for rate control  - we had an extensive discussion regarding all 2ndary causes of AFib which include but are not limited to the following and then need for lifestyle modifications and stringent control of contributing medical conditions which include  - We recommended aggressive risk factor modification as previously discussed  - he has lost weight and is doing well with this  - his sleep study is negative for CATRINA   - successful cardioversion on 9/7/18, 5/23/19  - flecainide 100 mg BID: 5/16/19  - c/o recurrent AF/tachycardia; currently in sinus rhythm  - flecainide d/c'd per patient choice 6/18/19  - current medical therapy includes Eliquis and BB    2. Hyperlipidemia  - managed by PCP  - on lipitor    3. HTN  - well controlled at this visit   - continue current medications    4.  DM  - managed by PCP  - on Januvia  - states blood sugars are well controlled  Lab Results   Component Value Date    LABA1C 7.3 (H) 04/10/2019     No results found for: EAG  - see #1    5. GERD  - nexium    6. Diverticulosis  - managed by PCP    7. Obesity  - losing weight, doing well  - Body mass index is 31.31 kg/m². Plan:     Mr Sigrid Vargas presents for one month follow-up s/p successful CV 5/23/19 on flecainide 100 mg BID which was initiated 5/16/19. He states he has had recurrent AF/tachycardia since the procedure. An [facility] EKG was sent for Dr Gamaliel Walter review on 6/10/19 which showed NSR. He is in SR today as well. Based on his ongoing symptoms, despite sinus rhythm today, he has decided to discontinue flecainide which will require a 5-7 day wash-out period. He will continue on Toprol XL for HR control and Eliquis for stroke risk reduction. We discussed admission for Tikosyn loading as previously discussed with Dr Gamaliel Walter for rhythm control. He was given the out-of-pocket cost. He will let the office know his decision when he returns from vacation. 1. Discontinuation of flecainide per his decision. 2. Continue Toprol XL for HR control with consideration of uptitration as needed and BP allows. 3. Continue Eliquis. 4. No other changes were made in his medications today. Thank you for allowing me to participate in their care. I have spent a total of 45 minutes with the patient and his  family reviewing the above stated recommendations.  And a total of >50% of that time involved face-to-face time providing counseling and or coordination of care with the other providers    ROQUE Alvarenga-CNP, AGCNS - discussed with Dr Deyanira [facility]      CC: Dr Marie Meraz

## 2019-07-18 DIAGNOSIS — Z79.899 MEDICATION MANAGEMENT: ICD-10-CM

## 2019-07-18 RX ORDER — POTASSIUM CHLORIDE 20 MEQ/1
20 TABLET, EXTENDED RELEASE ORAL DAILY
Qty: 30 TABLET | Refills: 5 | Status: SHIPPED | OUTPATIENT
Start: 2019-07-18 | End: 2019-12-30 | Stop reason: SDUPTHER

## 2019-10-29 DIAGNOSIS — Z12.5 SCREENING FOR MALIGNANT NEOPLASM OF PROSTATE: ICD-10-CM

## 2019-10-29 DIAGNOSIS — E83.42 HYPOMAGNESEMIA: ICD-10-CM

## 2019-10-29 DIAGNOSIS — E78.2 MIXED HYPERLIPIDEMIA: ICD-10-CM

## 2019-10-29 DIAGNOSIS — E11.9 CONTROLLED TYPE 2 DIABETES MELLITUS WITHOUT COMPLICATION, UNSPECIFIED WHETHER LONG TERM INSULIN USE (HCC): Primary | ICD-10-CM

## 2019-11-01 ENCOUNTER — HOSPITAL ENCOUNTER (OUTPATIENT)
Age: 69
Discharge: HOME | End: 2019-11-01
Payer: MEDICARE

## 2019-11-01 DIAGNOSIS — E11.9 CONTROLLED TYPE 2 DIABETES MELLITUS WITHOUT COMPLICATION, UNSPECIFIED WHETHER LONG TERM INSULIN USE (HCC): ICD-10-CM

## 2019-11-01 DIAGNOSIS — E78.2 MIXED HYPERLIPIDEMIA: ICD-10-CM

## 2019-11-01 DIAGNOSIS — Z12.5 SCREENING FOR MALIGNANT NEOPLASM OF PROSTATE: ICD-10-CM

## 2019-11-01 DIAGNOSIS — E83.42 HYPOMAGNESEMIA: ICD-10-CM

## 2019-11-01 LAB
ALBUMIN SERPL-MCNC: 4.6 G/DL (ref 3.5–5.2)
ALP BLD-CCNC: 59 U/L (ref 40–129)
ALT SERPL-CCNC: 20 U/L (ref 0–40)
ANION GAP SERPL CALCULATED.3IONS-SCNC: 15 MMOL/L (ref 7–16)
AST SERPL-CCNC: 21 U/L (ref 0–39)
BASOPHILS ABSOLUTE: 0.05 E9/L (ref 0–0.2)
BASOPHILS RELATIVE PERCENT: 0.8 % (ref 0–2)
BILIRUB SERPL-MCNC: 0.6 MG/DL (ref 0–1.2)
BUN BLDV-MCNC: 14 MG/DL (ref 8–23)
CALCIUM SERPL-MCNC: 10 MG/DL (ref 8.6–10.2)
CHLORIDE BLD-SCNC: 93 MMOL/L (ref 98–107)
CHOLESTEROL, TOTAL: 159 MG/DL (ref 0–199)
CO2: 25 MMOL/L (ref 22–29)
CREAT SERPL-MCNC: 1.1 MG/DL (ref 0.7–1.2)
CREATININE URINE: 55 MG/DL (ref 40–278)
EOSINOPHILS ABSOLUTE: 0.52 E9/L (ref 0.05–0.5)
EOSINOPHILS RELATIVE PERCENT: 8.8 % (ref 0–6)
GFR AFRICAN AMERICAN: >60
GFR NON-AFRICAN AMERICAN: >60 ML/MIN/1.73
GLUCOSE BLD-MCNC: 146 MG/DL (ref 74–99)
HBA1C MFR BLD: 7.7 % (ref 4–5.6)
HCT VFR BLD CALC: 39.6 % (ref 37–54)
HDLC SERPL-MCNC: 37 MG/DL
HEMOGLOBIN: 13.3 G/DL (ref 12.5–16.5)
IMMATURE GRANULOCYTES #: 0.04 E9/L
IMMATURE GRANULOCYTES %: 0.7 % (ref 0–5)
LDL CHOLESTEROL CALCULATED: 89 MG/DL (ref 0–99)
LYMPHOCYTES ABSOLUTE: 1.52 E9/L (ref 1.5–4)
LYMPHOCYTES RELATIVE PERCENT: 25.8 % (ref 20–42)
MAGNESIUM: 1.8 MG/DL (ref 1.6–2.6)
MCH RBC QN AUTO: 30 PG (ref 26–35)
MCHC RBC AUTO-ENTMCNC: 33.6 % (ref 32–34.5)
MCV RBC AUTO: 89.4 FL (ref 80–99.9)
MICROALBUMIN UR-MCNC: <12 MG/L
MICROALBUMIN/CREAT UR-RTO: ABNORMAL (ref 0–30)
MONOCYTES ABSOLUTE: 0.82 E9/L (ref 0.1–0.95)
MONOCYTES RELATIVE PERCENT: 13.9 % (ref 2–12)
NEUTROPHILS ABSOLUTE: 2.94 E9/L (ref 1.8–7.3)
NEUTROPHILS RELATIVE PERCENT: 50 % (ref 43–80)
PDW BLD-RTO: 12.7 FL (ref 11.5–15)
PLATELET # BLD: 167 E9/L (ref 130–450)
PMV BLD AUTO: 11.2 FL (ref 7–12)
POTASSIUM SERPL-SCNC: 4.3 MMOL/L (ref 3.5–5)
PROSTATE SPECIFIC ANTIGEN: 1.55 NG/ML (ref 0–4)
RBC # BLD: 4.43 E12/L (ref 3.8–5.8)
SODIUM BLD-SCNC: 133 MMOL/L (ref 132–146)
TOTAL PROTEIN: 7.6 G/DL (ref 6.4–8.3)
TRIGL SERPL-MCNC: 163 MG/DL (ref 0–149)
TSH SERPL DL<=0.05 MIU/L-ACNC: 2.39 UIU/ML (ref 0.27–4.2)
VLDLC SERPL CALC-MCNC: 33 MG/DL
WBC # BLD: 5.9 E9/L (ref 4.5–11.5)

## 2019-11-01 PROCEDURE — G0103 PSA SCREENING: HCPCS

## 2019-11-01 PROCEDURE — 80061 LIPID PANEL: CPT

## 2019-11-01 PROCEDURE — 85025 COMPLETE CBC W/AUTO DIFF WBC: CPT

## 2019-11-01 PROCEDURE — 80053 COMPREHEN METABOLIC PANEL: CPT

## 2019-11-01 PROCEDURE — 83735 ASSAY OF MAGNESIUM: CPT

## 2019-11-01 PROCEDURE — 83036 HEMOGLOBIN GLYCOSYLATED A1C: CPT

## 2019-11-01 PROCEDURE — 84443 ASSAY THYROID STIM HORMONE: CPT

## 2019-11-01 PROCEDURE — 82044 UR ALBUMIN SEMIQUANTITATIVE: CPT

## 2019-11-01 PROCEDURE — 82570 ASSAY OF URINE CREATININE: CPT

## 2019-11-01 PROCEDURE — 36415 COLL VENOUS BLD VENIPUNCTURE: CPT

## 2019-11-05 ENCOUNTER — OFFICE VISIT (OUTPATIENT)
Dept: FAMILY MEDICINE CLINIC | Age: 69
End: 2019-11-05
Payer: MEDICARE

## 2019-11-05 VITALS
SYSTOLIC BLOOD PRESSURE: 126 MMHG | OXYGEN SATURATION: 98 % | BODY MASS INDEX: 31.25 KG/M2 | HEIGHT: 69 IN | WEIGHT: 211 LBS | HEART RATE: 61 BPM | RESPIRATION RATE: 16 BRPM | DIASTOLIC BLOOD PRESSURE: 66 MMHG

## 2019-11-05 DIAGNOSIS — I48.91 NEW ONSET ATRIAL FIBRILLATION (HCC): ICD-10-CM

## 2019-11-05 DIAGNOSIS — E11.9 CONTROLLED TYPE 2 DIABETES MELLITUS WITHOUT COMPLICATION, UNSPECIFIED WHETHER LONG TERM INSULIN USE (HCC): ICD-10-CM

## 2019-11-05 DIAGNOSIS — Z00.00 ROUTINE GENERAL MEDICAL EXAMINATION AT A HEALTH CARE FACILITY: Primary | ICD-10-CM

## 2019-11-05 DIAGNOSIS — Z79.899 MEDICATION MANAGEMENT: ICD-10-CM

## 2019-11-05 PROCEDURE — G0438 PPPS, INITIAL VISIT: HCPCS | Performed by: FAMILY MEDICINE

## 2019-11-05 RX ORDER — METOPROLOL SUCCINATE 25 MG/1
TABLET, EXTENDED RELEASE ORAL
Qty: 180 TABLET | Refills: 3 | Status: SHIPPED | OUTPATIENT
Start: 2019-11-05 | End: 2020-01-10 | Stop reason: SDUPTHER

## 2019-11-05 ASSESSMENT — LIFESTYLE VARIABLES
AUDIT-C TOTAL SCORE: 3
HOW OFTEN DURING THE LAST YEAR HAVE YOU FOUND THAT YOU WERE NOT ABLE TO STOP DRINKING ONCE YOU HAD STARTED: 0
HOW OFTEN DURING THE LAST YEAR HAVE YOU NEEDED AN ALCOHOLIC DRINK FIRST THING IN THE MORNING TO GET YOURSELF GOING AFTER A NIGHT OF HEAVY DRINKING: 0
AUDIT-C TOTAL SCORE: 3
HOW OFTEN DO YOU HAVE A DRINK CONTAINING ALCOHOL: 3
HOW MANY STANDARD DRINKS CONTAINING ALCOHOL DO YOU HAVE ON A TYPICAL DAY: 0
HOW OFTEN DO YOU HAVE SIX OR MORE DRINKS ON ONE OCCASION: 0
HAS A RELATIVE, FRIEND, DOCTOR, OR ANOTHER HEALTH PROFESSIONAL EXPRESSED CONCERN ABOUT YOUR DRINKING OR SUGGESTED YOU CUT DOWN: 0
HOW MANY STANDARD DRINKS CONTAINING ALCOHOL DO YOU HAVE ON A TYPICAL DAY: 0
HOW OFTEN DURING THE LAST YEAR HAVE YOU BEEN UNABLE TO REMEMBER WHAT HAPPENED THE NIGHT BEFORE BECAUSE YOU HAD BEEN DRINKING: 0
HAVE YOU OR SOMEONE ELSE BEEN INJURED AS A RESULT OF YOUR DRINKING: 0
AUDIT TOTAL SCORE: 3
HOW OFTEN DURING THE LAST YEAR HAVE YOU HAD A FEELING OF GUILT OR REMORSE AFTER DRINKING: 0
HOW OFTEN DO YOU HAVE A DRINK CONTAINING ALCOHOL: 3
HOW OFTEN DURING THE LAST YEAR HAVE YOU FAILED TO DO WHAT WAS NORMALLY EXPECTED FROM YOU BECAUSE OF DRINKING: 0
HOW OFTEN DO YOU HAVE SIX OR MORE DRINKS ON ONE OCCASION: 0

## 2019-11-05 ASSESSMENT — PATIENT HEALTH QUESTIONNAIRE - PHQ9
SUM OF ALL RESPONSES TO PHQ QUESTIONS 1-9: 0
SUM OF ALL RESPONSES TO PHQ QUESTIONS 1-9: 0

## 2020-01-02 ENCOUNTER — TELEPHONE (OUTPATIENT)
Dept: FAMILY MEDICINE CLINIC | Age: 70
End: 2020-01-02

## 2020-01-10 RX ORDER — METOPROLOL SUCCINATE 25 MG/1
TABLET, EXTENDED RELEASE ORAL
Qty: 180 TABLET | Refills: 3 | Status: SHIPPED
Start: 2020-01-10 | End: 2021-03-23

## 2020-01-10 RX ORDER — METFORMIN HYDROCHLORIDE 500 MG/1
1000 TABLET, EXTENDED RELEASE ORAL
Qty: 180 TABLET | Refills: 3 | Status: SHIPPED | OUTPATIENT
Start: 2020-01-10 | End: 2020-02-03 | Stop reason: SDUPTHER

## 2020-02-03 RX ORDER — METFORMIN HYDROCHLORIDE 500 MG/1
1000 TABLET, EXTENDED RELEASE ORAL
Qty: 180 TABLET | Refills: 3 | Status: SHIPPED
Start: 2020-02-03 | End: 2020-12-08 | Stop reason: SDUPTHER

## 2020-03-10 LAB
CONTROL: NORMAL
HEMOCCULT STL QL: POSITIVE

## 2020-03-10 PROCEDURE — 82274 ASSAY TEST FOR BLOOD FECAL: CPT | Performed by: FAMILY MEDICINE

## 2020-04-14 RX ORDER — LISINOPRIL AND HYDROCHLOROTHIAZIDE 25; 20 MG/1; MG/1
1 TABLET ORAL DAILY
Qty: 90 TABLET | Refills: 3 | Status: SHIPPED
Start: 2020-04-14 | End: 2021-03-30 | Stop reason: SDUPTHER

## 2020-06-22 RX ORDER — POTASSIUM CHLORIDE 1500 MG/1
TABLET, FILM COATED, EXTENDED RELEASE ORAL
Qty: 90 TABLET | Refills: 0 | OUTPATIENT
Start: 2020-06-22

## 2020-06-22 RX ORDER — POTASSIUM CHLORIDE 20 MEQ/1
20 TABLET, EXTENDED RELEASE ORAL DAILY
Qty: 90 TABLET | Refills: 1 | Status: SHIPPED
Start: 2020-06-22 | End: 2020-12-08 | Stop reason: SDUPTHER

## 2020-08-13 ENCOUNTER — VIRTUAL VISIT (OUTPATIENT)
Dept: FAMILY MEDICINE CLINIC | Age: 70
End: 2020-08-13
Payer: MEDICARE

## 2020-08-13 PROCEDURE — 99214 OFFICE O/P EST MOD 30 MIN: CPT | Performed by: FAMILY MEDICINE

## 2020-08-13 ASSESSMENT — PATIENT HEALTH QUESTIONNAIRE - PHQ9
1. LITTLE INTEREST OR PLEASURE IN DOING THINGS: 1
SUM OF ALL RESPONSES TO PHQ9 QUESTIONS 1 & 2: 1
2. FEELING DOWN, DEPRESSED OR HOPELESS: 0
SUM OF ALL RESPONSES TO PHQ QUESTIONS 1-9: 1
SUM OF ALL RESPONSES TO PHQ QUESTIONS 1-9: 1

## 2020-08-13 ASSESSMENT — ENCOUNTER SYMPTOMS
SHORTNESS OF BREATH: 0
ABDOMINAL PAIN: 0

## 2020-08-13 NOTE — PROGRESS NOTES
2020    TELEHEALTH EVALUATION -- Audio/Visual (During BRFKP-23 public health emergency)    HPI:    [employer]  (:  1950) has requested an audio/video evaluation. Consent obtained per MA note, with attention to limitations inherent to a video visit for the following concern(s):    Diabetes:  Type 2  Chronic issue, present for years  Patient feels well. Issue is  controlled. Patient does not have complaints or concerns today. Medications reviewed. Currently on metformin XR  Taking all medications and tolerating well. Glucose screens being checked  yes - not chekcing regularly. States usually 150.   hypoglycemic episodes no  Denies other SSx  Patient is taking ASA No: is on anticoagulation  Taking  Ace Inhibitor/ARB. Yes  Patient is  on appropriately-dosed statin. He is overdue for labs  Most recent labs reviewed with patient. Lab Results   Component Value Date    LABA1C 7.7 (H) 2019     No results found for: EAG  Lab Results   Component Value Date    LDLCALC 89 2019     A. fib, CAD/HTN/HLD  Follow up  Chronic issues, present for over a year  Reports ongoing compliance with his anticoagulation. His other meds are reviewed and he reports compliance  He does have a history of hypokalemia and hypomagnesemia. He is again due for repeat labs  States has transient episodes of cardiac dysrhythmia that will last a matter of hours and spontaneously remit  Has no complaints of chest pain, dizziness, syncope, near syncope  He is overdue to see cardiology    He is due for colorectal cancer screening. He does not want a colonoscopy but is agreeable only to a Cologuard test.  He declines FIT testing    Review of Systems   Constitutional: Negative for chills, fatigue and fever. HENT: Negative for congestion. Respiratory: Negative for shortness of breath. Cardiovascular: Negative for chest pain, palpitations and leg swelling. Gastrointestinal: Negative for abdominal pain. Neurological: Negative for dizziness, syncope and headaches. Prior to Visit Medications    Medication Sig Taking?  Authorizing Provider   potassium chloride (KLOR-CON M) 20 MEQ extended release tablet Take 1 tablet by mouth daily Yes Jennyfer Maxwell MD   lisinopril-hydroCHLOROthiazide (PRINZIDE;ZESTORETIC) 20-25 MG per tablet Take 1 tablet by mouth daily Yes Jennyfer Maxwell MD   metFORMIN (GLUCOPHAGE-XR) 500 MG extended release tablet Take 2 tablets by mouth daily (with breakfast) Yes Jennyfer Maxwell MD   metoprolol succinate (TOPROL XL) 25 MG extended release tablet TAKE ONE TABLET BY MOUTH EVERY DAY Yes Jennyfer Maxwell MD   atorvastatin (LIPITOR) 80 MG tablet Take 1 tablet by mouth daily Yes Jennyfer Maxwell MD   apixaban (ELIQUIS) 5 MG TABS tablet Take 1 tablet by mouth 2 times daily Yes Jennyfer Maxwell MD   magnesium oxide (MAG-OX) 400 (240 Mg) MG tablet Take 1 tablet by mouth 2 times daily Yes Jennyfer Maxwell MD   Jatinder Quill LANCETS 20P MISC testing once daily Yes Jennyfer Maxwell MD   ONE TOUCH ULTRA TEST strip TEST ONE TIME DAILY  Yes Jennyfer Maxwell MD   esomeprazole (NEXIUM) 40 MG delayed release capsule Take 40 mg by mouth daily Instructed to take morning of surgery with a sip of water Yes Historical Provider, MD   Melatonin 10 MG CAPS Take by mouth nightly  Historical Provider, MD       Social History     Tobacco Use    Smoking status: Former Smoker     Packs/day: 1.50     Years: 15.00     Pack years: 22.50     Types: Cigarettes     Last attempt to quit: 1997     Years since quittin.3    Smokeless tobacco: Never Used   Substance Use Topics    Alcohol use: Yes     Comment: moderate alcohol use / drinks 12 beers or shots liquor per week    Drug use: No        Past Medical History:   Diagnosis Date    Allergic rhinitis     Atrial fibrillation ([facility]     follows with Dr. Amada Babinski Cholelithiasis     noted on 3/11 CT    COPD (chronic obstructive pulmonary disease) [facility])     early stages    Diabetes type 2, controlled ([facility]     Diverticulosis     noted on 3/11 CT    GERD (gastroesophageal reflux disease)     Hyperlipidemia     Hypertension        PHYSICAL EXAMINATION:  Vital Signs: (As obtained by patient/caregiver or practitioner observation)    Physical Exam  Constitutional:       General: He is not in acute distress. Appearance: Normal appearance. Pulmonary:      Effort: Pulmonary effort is normal. No respiratory distress. Neurological:      Mental Status: He is alert. Mental status is at baseline. Psychiatric:         Mood and Affect: Mood normal.         Behavior: Behavior normal.           Other pertinent observable physical exam findings-     Due to this being a TeleHealth encounter, evaluation of the following organ systems is limited: Vitals/Constitutional/EENT/Resp/CV/GI//MS/Neuro/Skin/Heme-Lymph-Imm. ASSESSMENT/PLAN:  Zaire Alvarado was seen today for hypertension and diabetes. Diagnoses and all orders for this visit:    Controlled type 2 diabetes mellitus without complication, unspecified whether long term insulin use (HCC)  -     CBC Auto Differential; Future  -     Comprehensive Metabolic Panel; Future  -     Lipid Panel; Future  -     Hemoglobin A1C; Future    Persistent atrial fibrillation    Hypomagnesemia  -     Magnesium; Future    Colon cancer screening  -     Cologuard (For External Results Only); Future    Essential hypertension    Hypokalemia    Plan  Is difficult to assess his overall stability. Clinically he is reporting no significant issues but I do think he needs routine labs. He is agreeable. Stressed the importance of following up with cardiology. He is well overdue for that. Since he wants Cologuard, will ask staff to facilitate. Indicated he should be seen in person in the office for follow-up. Of course issues relating to pandemic may require another video visit. He verbalizes comfort and understanding instructions. Questions were addressed      Return in about 4 months (around 12/13/2020), or if symptoms worsen or fail to improve, for DM check. An  electronic signature was used to authenticate this note. --Tuan Lema MD on 8/13/2020 at 4:08 PM        Pursuant to the emergency declaration under the [address] [place] [address] [facility] authority and the [employer] Act, this Virtual  Visit was conducted, with patient's consent, to reduce the patient's risk of exposure to COVID-19 and provide continuity of care for an established patient. Services were provided through a video synchronous discussion virtually to substitute for in-person clinic visit.

## 2020-08-13 NOTE — PROGRESS NOTES
TeleMedicine Patient Consent    This visit was performed as a virtual video visit using a synchronous, two-way, audio-video telehealth technology platform. Patient identification was verified at the start of the visit, including the patient's telephone number and physical location. I discussed with the patient the nature of our telehealth visits, that:     1. Due to the nature of an audio- video modality, the only components of a physical exam that could be done are the elements supported by direct observation. 2. The provider will evaluate the patient and recommend diagnostics and treatments based on their assessment. 3. If it was felt that the patient should be evaluated in clinic or an emergency room setting, then they would be directed there. 4. Our sessions are not being recorded and that personal health information is protected. 5. Our team would provide follow up care in person if/when the patient needs it. Patient does agree to proceed with telemedicine consultation. Patient location: home address in [facility]    Physician location: regular office location    This visit was completed virtually using D[employer]. me    This visit was performed during the 4932 public health crisis and COVID-19 pandemic.   *Add GT modifier to all Video Visits*

## 2020-08-24 ENCOUNTER — TELEPHONE (OUTPATIENT)
Dept: PRIMARY CARE CLINIC | Age: 70
End: 2020-08-24

## 2020-08-24 NOTE — TELEPHONE ENCOUNTER
Spoke to pt regarding order for Cologuard,pt stated that the Cologuard was defected  and was sent back to [facility] and when he gets the new one he will call stating   specimen was done and sent in.

## 2020-12-08 ENCOUNTER — OFFICE VISIT (OUTPATIENT)
Dept: FAMILY MEDICINE CLINIC | Age: 70
End: 2020-12-08
Payer: MEDICARE

## 2020-12-08 VITALS
WEIGHT: 211 LBS | BODY MASS INDEX: 31.25 KG/M2 | TEMPERATURE: 97.3 F | RESPIRATION RATE: 16 BRPM | DIASTOLIC BLOOD PRESSURE: 66 MMHG | HEART RATE: 60 BPM | SYSTOLIC BLOOD PRESSURE: 138 MMHG | HEIGHT: 69 IN | OXYGEN SATURATION: 96 %

## 2020-12-08 LAB
CREATININE URINE POCT: 100
HBA1C MFR BLD: 9 %
MICROALBUMIN/CREAT 24H UR: 10 MG/G{CREAT}
MICROALBUMIN/CREAT UR-RTO: <30

## 2020-12-08 PROCEDURE — 3052F HG A1C>EQUAL 8.0%<EQUAL 9.0%: CPT | Performed by: FAMILY MEDICINE

## 2020-12-08 PROCEDURE — 83036 HEMOGLOBIN GLYCOSYLATED A1C: CPT | Performed by: FAMILY MEDICINE

## 2020-12-08 PROCEDURE — 99214 OFFICE O/P EST MOD 30 MIN: CPT | Performed by: FAMILY MEDICINE

## 2020-12-08 PROCEDURE — 82044 UR ALBUMIN SEMIQUANTITATIVE: CPT | Performed by: FAMILY MEDICINE

## 2020-12-08 RX ORDER — METFORMIN HYDROCHLORIDE 500 MG/1
1000 TABLET, EXTENDED RELEASE ORAL
Qty: 180 TABLET | Refills: 3 | Status: SHIPPED
Start: 2020-12-08 | End: 2022-01-03 | Stop reason: SDUPTHER

## 2020-12-08 RX ORDER — TRAZODONE HYDROCHLORIDE 100 MG/1
100 TABLET ORAL NIGHTLY PRN
Qty: 90 TABLET | Refills: 1 | Status: SHIPPED
Start: 2020-12-08 | End: 2021-06-30

## 2020-12-08 RX ORDER — POTASSIUM CHLORIDE 20 MEQ/1
20 TABLET, EXTENDED RELEASE ORAL DAILY
Qty: 90 TABLET | Refills: 1 | Status: SHIPPED
Start: 2020-12-08 | End: 2021-06-09

## 2020-12-08 ASSESSMENT — ENCOUNTER SYMPTOMS
SHORTNESS OF BREATH: 0
ABDOMINAL PAIN: 0
CONSTIPATION: 0
TROUBLE SWALLOWING: 0
BLOOD IN STOOL: 0
COUGH: 0
WHEEZING: 0
DIARRHEA: 0

## 2020-12-08 NOTE — PROGRESS NOTES
CC   Chief Complaint   Patient presents with    Diabetes     HPI:   Patient comes in today for the below issue(s). Atrial fibrillation  Follow up  Is on anticoagulation (Eliquis)   Paroxysmal but feels that he is mostly in sinus rhythm. Denies exertional dyspnea  No chest pain, tachycardia or symptomatically having palpitations   No syncope  meds reviewed, compliant   Denies bleeding or bruising     Insomnia  Follow up  Tried on trazodone   States he tolerated it adequately but not helping to degree he had hoped. GERD   Follow-up  Patient doing well. Continues to take Nexium, but only taking it intermittently  States heartburn is resolved. No dysphagia or odontophagia. No abdominal pain. Bowels are regular    Diabetes:  Type 2  Chronic issue, present for years  Patient feels well. Issue is  controlled. Patient does not have complaints or concerns today. Medications reviewed. Currently on Glucophage XRTaking all medications and tolerating well. Glucose screens being checked  No  Admits to deviating from diet. More carbs. hypoglycemic episodes no  Patient is reporting polyphagia. Denies other SSx  Patient is taking ASA No: Eliquis   Taking  Ace Inhibitor/ARB. Yes  Patient is  on appropriately-dosed statin. Patient does not see Podiatry regularly. Patient does not smoke. Most recent labs reviewed with patient. Lab Results   Component Value Date    LABA1C 9.0 12/08/2020     No results found for: EAG  Lab Results   Component Value Date    LDLCALC 89 11/01/2019         Review of Systems  Review of Systems   Constitutional: Positive for fatigue. Negative for chills, diaphoresis and fever. HENT: Positive for congestion. Negative for trouble swallowing. Respiratory: Negative for cough, shortness of breath and wheezing. Cardiovascular: Positive for palpitations (Improved). Negative for chest pain and leg swelling.    Gastrointestinal: Negative for abdominal pain, blood in stool, constipation and diarrhea. Genitourinary: Negative for difficulty urinating and dysuria. Musculoskeletal: Negative for arthralgias. Neurological: Negative for dizziness, syncope and headaches. Hematological: Does not bruise/bleed easily.           Past Medical History:   Diagnosis Date    Allergic rhinitis     [facility]     follows with EP    Cholelithiasis     noted on 3/11 CT    COPD [facility]ch[facility] obstructive [facility] disease[facility]     early stages    Diabetes type 2, [facility] ([facility]     Diverticulosis     noted on 3/11 CT    GERD (gastroesophageal reflux disease)     Hyperlipidemia     Hypertension      Social History     Tobacco Use    Smoking status: Former Smoker     Packs/day: 1.50     Years: 15.00     Pack years: 22.50     Types: Cigarettes     Last attempt to quit: 1997     Years since quittin.6    Smokeless tobacco: Never Used   Substance Use Topics    Alcohol use: Yes     Comment: moderate alcohol use / drinks 12 beers or shots liquor per week    Drug use: No         PE:  VS:  /66   Pulse 60   Temp 97.3 °F (36.3 °C) (Temporal)   Resp 16   Ht 5' 9\" (1.753 m)   Wt 211 lb (95.7 kg)   SpO2 96%   BMI 31.16 kg/m²   Physical Exam  General: Well nourished, well developed, no acute distress  Eyes:  PERRL   Conjunctiva unremarkable   ENT:  TM's intact bilaterally, no effusion   Nasal:  No mucosal edema     No nasal drainage   Oral:  mucosa moist and pink             no posterior pharyngeal drainage     no posterior pharyngeal exudate  Neck:  Supple   No palpable cervical lymphoadenopathy   Thyroid without mass or enlargement   No carotid bruits  Resp: Lungs CTAB   Equal and adequate air exchange without accessory muscle use   No rales, rhonchi or wheeze  CV: S1S2 regular rate and rhythm, no ectopy   No murmur   Intact distal pulses   No edema  GI: Abdomen Soft, non tender, non distended   Normoactive bowel sounds  MS: Physiologic ROM of all extremities Intact distal pulses   No clubbing or cyanosis   Skin Warm and dry; no rash or lesion  Neuro: Alert and oriented; normal and intact dtr's   Psych: Euthymic mood, congruent affect       Assessment (including specific orders/meds/labs):      Ana María Ro was seen today for diabetes. Diagnoses and all orders for this visit:    Controlled type 2 diabetes mellitus without complication, unspecified whether long term insulin use (HCC)  -     POCT glycosylated hemoglobin (Hb A1C)  -     POCT Microalbumin    Paroxysmal atrial fibrillation (HCC)    Mixed hyperlipidemia    Essential hypertension    Gastroesophageal reflux disease with esophagitis without hemorrhage    Medication management  -     apixaban (ELIQUIS) 5 MG TABS tablet; Take 1 tablet by mouth 2 times daily  -     potassium chloride (KLOR-CON M) 20 MEQ extended release tablet; Take 1 tablet by mouth daily    Insomnia, unspecified type    Other orders  -     metFORMIN (GLUCOPHAGE-XR) 500 MG extended release tablet; Take 2 tablets by mouth daily (with breakfast)  -     traZODone (DESYREL) 100 MG tablet; Take 1 tablet by mouth nightly as needed for Sleep        Plan:     Diabetes is not at goal.  I suggested we add another medication, most likely a flozin. However, he declines at this time. He wants to work on his diet. Reasonable for now, but if at follow-up not improved, we will reconsider. Advised on complications of uncontrolled diabetes including but not limited to ophthalmologic, vascular, renal, neuropathic complications    Atrial fibrillation seems to be in sinus rhythm. However he should remain on anticoagulation. Follow-up with cardiology per their recommendations    Blood pressure is at goal.  Check status of lipids. Insomnia that is perhaps worsening. We will titrate up the trazodone. If not improving will either increase dose further or consider switch to a TCA.     Call or go to ED immediately if symptoms worsen or persist. Advised patient to call with any new medication issues, and, as applicable, read all Rx info from pharmacy to assure aware ofall possible risks and side effects of medication before taking. Patient and/or guardian given opportunity to ask questions/raise concerns. He verbalized comfort and understanding of instructions. Follow Up:     Return in about 3 months (around 3/8/2021), or if symptoms worsen or fail to improve, for DM check. or sooner if the above issues change unexpectedly or new issues develop     This document was prepared at least partially through the use ofCurisice recognition software. Although effort is taken to assure the accuracy of this document, it is possible that grammatical, syntax,  or spelling errors may occur.       Electronically signed by Jorge Carias MD FAAFP

## 2020-12-09 DIAGNOSIS — E83.42 HYPOMAGNESEMIA: ICD-10-CM

## 2020-12-09 DIAGNOSIS — E11.9 CONTROLLED TYPE 2 DIABETES MELLITUS WITHOUT COMPLICATION, UNSPECIFIED WHETHER LONG TERM INSULIN USE (HCC): ICD-10-CM

## 2020-12-09 LAB
ALBUMIN SERPL-MCNC: 4.9 G/DL (ref 3.5–5.2)
ALP BLD-CCNC: 58 U/L (ref 40–129)
ALT SERPL-CCNC: 25 U/L (ref 0–40)
ANION GAP SERPL CALCULATED.3IONS-SCNC: 12 MMOL/L (ref 7–16)
AST SERPL-CCNC: 20 U/L (ref 0–39)
BASOPHILS ABSOLUTE: 0.04 E9/L (ref 0–0.2)
BASOPHILS RELATIVE PERCENT: 0.5 % (ref 0–2)
BILIRUB SERPL-MCNC: 0.5 MG/DL (ref 0–1.2)
BUN BLDV-MCNC: 14 MG/DL (ref 8–23)
CALCIUM SERPL-MCNC: 10.1 MG/DL (ref 8.6–10.2)
CHLORIDE BLD-SCNC: 94 MMOL/L (ref 98–107)
CHOLESTEROL, TOTAL: 192 MG/DL (ref 0–199)
CO2: 29 MMOL/L (ref 22–29)
CREAT SERPL-MCNC: 1.1 MG/DL (ref 0.7–1.2)
EOSINOPHILS ABSOLUTE: 0.49 E9/L (ref 0.05–0.5)
EOSINOPHILS RELATIVE PERCENT: 6.4 % (ref 0–6)
GFR AFRICAN AMERICAN: >60
GFR NON-AFRICAN AMERICAN: >60 ML/MIN/1.73
GLUCOSE BLD-MCNC: 168 MG/DL (ref 74–99)
HCT VFR BLD CALC: 44.4 % (ref 37–54)
HDLC SERPL-MCNC: 41 MG/DL
HEMOGLOBIN: 14.5 G/DL (ref 12.5–16.5)
IMMATURE GRANULOCYTES #: 0.03 E9/L
IMMATURE GRANULOCYTES %: 0.4 % (ref 0–5)
LDL CHOLESTEROL CALCULATED: 111 MG/DL (ref 0–99)
LYMPHOCYTES ABSOLUTE: 1.85 E9/L (ref 1.5–4)
LYMPHOCYTES RELATIVE PERCENT: 24 % (ref 20–42)
MAGNESIUM: 1.9 MG/DL (ref 1.6–2.6)
MCH RBC QN AUTO: 29.4 PG (ref 26–35)
MCHC RBC AUTO-ENTMCNC: 32.7 % (ref 32–34.5)
MCV RBC AUTO: 89.9 FL (ref 80–99.9)
MONOCYTES ABSOLUTE: 0.66 E9/L (ref 0.1–0.95)
MONOCYTES RELATIVE PERCENT: 8.6 % (ref 2–12)
NEUTROPHILS ABSOLUTE: 4.64 E9/L (ref 1.8–7.3)
NEUTROPHILS RELATIVE PERCENT: 60.1 % (ref 43–80)
PDW BLD-RTO: 13 FL (ref 11.5–15)
PLATELET # BLD: 206 E9/L (ref 130–450)
PMV BLD AUTO: 11.4 FL (ref 7–12)
POTASSIUM SERPL-SCNC: 4.3 MMOL/L (ref 3.5–5)
RBC # BLD: 4.94 E12/L (ref 3.8–5.8)
SODIUM BLD-SCNC: 135 MMOL/L (ref 132–146)
TOTAL PROTEIN: 8.1 G/DL (ref 6.4–8.3)
TRIGL SERPL-MCNC: 199 MG/DL (ref 0–149)
VLDLC SERPL CALC-MCNC: 40 MG/DL
WBC # BLD: 7.7 E9/L (ref 4.5–11.5)

## 2020-12-21 RX ORDER — ATORVASTATIN CALCIUM 80 MG/1
TABLET, FILM COATED ORAL
Qty: 90 TABLET | Refills: 0 | Status: SHIPPED
Start: 2020-12-21 | End: 2021-04-06 | Stop reason: SDUPTHER

## 2021-01-12 ENCOUNTER — NURSE ONLY (OUTPATIENT)
Dept: NON INVASIVE DIAGNOSTICS | Age: 71
End: 2021-01-12
Payer: MEDICARE

## 2021-01-12 ENCOUNTER — TELEPHONE (OUTPATIENT)
Dept: NON INVASIVE DIAGNOSTICS | Age: 71
End: 2021-01-12

## 2021-01-12 DIAGNOSIS — I48.19 PERSISTENT ATRIAL FIBRILLATION (HCC): Primary | ICD-10-CM

## 2021-01-12 PROCEDURE — 93000 ELECTROCARDIOGRAM COMPLETE: CPT | Performed by: INTERNAL MEDICINE

## 2021-01-12 NOTE — PROGRESS NOTES
Patient was in today for EKG per Dr Niko Ryan. Patient states he felt like he was in afib this morning but is feeling ok right now.     Kathryn Soto

## 2021-02-28 ASSESSMENT — ENCOUNTER SYMPTOMS
SHORTNESS OF BREATH: 0
CHEST TIGHTNESS: 0

## 2021-02-28 NOTE — PROGRESS NOTES
[address] [facility] Electrophysiology  Progress note  Patient: Erin Villafuerte  Medical Record Number: [id number]  Date of Procedure:  3/4/2021  Attending Electrophysiologist: Shazia Gonzalez DO  Referring Physician: Niki Thompson MD     CHIEF COMPLAINT: persistent atrial fibrillation 6/18/19: Mr. Sophia Macias is present today for a follow up. Mr. Sophia Macias was noted to be in atrial fibrillation at his OV with Dr Abelino Parada 4/26/19. He had an echocardiogram demonstrating normal LV function and wall thickness as well as previous negative stress test. Flecainide 100 mg BID was initiated. He had not missed any Eliquis doses. He then underwent successful CV on 5/23/19. On 5/30/19 he called the office he felt he was \"out of rhythm\" and requested to come off flecainide. He was asked to have an ECG performed to verify the arrhythmia which he declined since he was on vacation. He sent an [employer] EKG on 6/10/19 and he was in [place] [facility]. Recommendation was made by Dr Abelino Parada, if he goes back into AF, attempt one more CV while on flecainide. He presents for follow-up accompanied by his wife. He is in sinus rhythm today with a HR of 66 bpm. He reports multiple episodes of recurrent atrial fibrillation since the initiation of flecainide and has requested to discontinue the medication since it is \"not working. \" We discussed, at length, the presentation in sinus rhythm, purpose of BB/flecainide combination for rhythm/rate control, role of BB therapy, and other AAD therapy choices (sotalol/Tikosyn) as previously discussed with Dr Abelino Parada. He is leaving for vacation Friday and will return in early July. He is considering elective admission for Tikosyn initation upon his return. We discussed his out-of-pocket Tikosyn cost 30 day $95.00 and 90 day $180.00 (4/30/19 encounter). He also discussed staying only on [facility] for stroke risk reduction and Toprol XL. He will call our office when he returns. He denies angina, dyspnea, syncope, orthopnea and PND. 3/4/2021: Maria R Horan presents for outpatient follow-up. He remains on Eliquis with no bleeding issues; we discussed long-term use based on [id number] score (3). He was considering elective admission for Tikosyn at his previous visit which he had decided against. He presented for an EKG in January feeling that he was in AF; he was in SR with HR 66 bpm. Today he presents in sinus rhythm, feeling well and offers no complaints. He states he has had very brief episodes of palpitations but nothing bothersome or significant. The episode he came into the office was a little longer with elevated HRs prompting the call. He denies angina, dyspnea, syncope, orthopnea and PND.         Patient Active Problem List    Diagnosis Date Noted    Atrial fibrillation ([address]      Overview Note:     follows with EP      Hypokalemia 12/05/2018    Hypomagnesemia 12/05/2018    Typical atrial flutter ([address] 08/02/2018    S/P ablation of atrial flutter 08/02/2018    S/P ablation of atrial fibrillation 08/01/2018    Persistent atrial fibrillation ([address] 06/06/2018    Sleep-disordered breathing 06/06/2018    Class 1 obesity due to excess calories with serious comorbidity and body mass index (BMI) of 32.0 to 32.9 in adult 06/06/2018    Fatigue 04/02/2015    Erectile dysfunction 05/06/2012    Diverticulosis     Cholelithiasis     Diabetes type 2, controlled ([address]     Allergic rhinitis     Hyperlipidemia     Essential hypertension     GERD (gastroesophageal reflux disease)        Past Medical History:   Diagnosis Date    Allergic rhinitis     Atrial fibrillation ([address]     follows with EP    Cholelithiasis     noted on 3/11 CT    COPD (chronic obstructive pulmonary disease) (HCC)     early stages    Diabetes type 2, controlled (HCC)     Diverticulosis     noted on 3/11 CT    GERD (gastroesophageal reflux disease)     Hyperlipidemia     Hypertension      Family History   Problem Relation Age of Onset    Dementia Mother Cardiovascular: Positive for palpitations. Negative for chest pain. Periods of tachycardia, spontaneously stops   Neurological: Negative for dizziness, syncope and light-headedness. All other systems reviewed and are negative. PHYSICAL EXAM:  Vitals:    03/04/21 0818   BP: 136/84   Pulse: 62   Resp: 18   Weight: 211 lb (95.7 kg)   Height: 5' 10\" (1.778 m)     Constitutional: Oriented to person, place, and time. Well-developed and cooperative. Head: Normocephalic and atraumatic. Eyes: Conjunctivae are normal.    Cardiovascular: S1 normal, S2 normal and intact distal pulses. A regular rhythm present. PMI is not displaced. Pulmonary/Chest: Effort normal and breath sounds normal. No respiratory distress. Abdominal: Soft. No tenderness. Musculoskeletal: Normal range of motion of all extremities, no muscle weakness. Neurological: Alert and oriented to person, place, and time. Gait normal.   Skin: Skin is warm and dry. No bruising, no ecchymosis and no rash noted. Extremity: No clubbing or cyanosis. No edema. Psychiatric: Normal mood and affect. Thought content normal.    Data:    No results for input(s): WBC, HGB, HCT, PLT in the last 72 hours. No results for input(s): NA, K, CL, CO2, BUN, CREATININE, CALCIUM in the last 72 hours. Invalid input(s): GLU  No results for input(s): INR in the last 72 hours. No results for input(s): TSH in the last 72 hours.   Lab Results   Component Value Date    MG 1.9 12/08/2020       EKG: 3/4/21: SR, HR 62 bpm, first degree AV delay        Last Echo: 5/17/18  Details     Reading Physician Reading Date Result Priority   Randee Darling MD 5/17/2018    Narrative     Transthoracic Echocardiography Report (TTE)     Demographics      Patient Name         [facility] [place] [facility]      Medical Record       [id number]     Room Number   Number      Account #           [de-identified]    Procedure Date        05/17/2018     Corporate ID                      Ordering Physician   Alysia Bella MD      Accession Number     [id number]    Referring Physician   Alysia Bella MD                                                           Naif Everett      Date of Birth        1950   Sonographer           [facility]      Age                  68 year(s)   Interpreting         Alysia Bella MD                                     Physician                                        Any Other     Procedure    Type of Study      TTE procedure:Echo Complete W/ Dop & Color Flow.     Procedure Date  Date: 05/17/2018 Start: 08:55 AM    Study Location: Echo Lab  Technical Quality: Adequate visualization    Indications:Atrial fibrillation.     Patient Status: Routine    Height: 69 inches Weight: 218 pounds BSA: 2.14 m^2 BMI: 32.19 kg/m^2    BP: 114/74 mmHg     Findings      Left Ventricle   Normal left ventricle size.   Normal left ventricle wall thickness.   No wall motion abnormalities.   Normal diastolic function.   Ejection fraction is visually estimated at 55-65%.      Right Ventricle   Normal right ventricular size and function.      Left Atrium   Left atrium is of normal size.   Atrial fibrillation      Right Atrium   Normal right atrium size.      Mitral Valve   Normal mitral valve structure and function.   Physiologic and/or trace mitral regurgitation is present.      Tricuspid Valve   Normal tricuspid valve structure and function.      Aortic Valve   Aortic valve opens well.   The aortic valve is trileaflet.   The aortic valve appears mildly sclerotic.      Pulmonic Valve   The pulmonic valve was not well visualized.      Pericardial Effusion   No evidence of pericardial effusion.      Aorta   Aortic root dimension within normal limits.      Conclusions      Summary   Normal diastolic function.   Ejection fraction is visually estimated at 55-65%.   Left atrium is of normal size.   Atrial fibrillation  Normal mitral valve structure and function.   The aortic valve is trileaflet.   The aortic valve appears mildly sclerotic.   No evidence of pericardial effusion.      Signature      ----------------------------------------------------------------   Electronically signed by Terence Limon MD(Interpreting   physician) on 05/17/2018 07:35 PM   ----------------------------------------------------------------     M-Mode/2D Measurements & Calculations      LV Diastolic    LV Systolic Dimension: 2.8   AV Cusp Separation: 1.9 cmLA   Dimension: 4.1  cm                           Dimension: 4.2 cmAO Root   cm              LV Volume Diastolic: 20.9 ml Dimension: 2.9 cm   LV FS:31.7 %    LV Volume Systolic: 25.6 ml   LV PW           LV EDV/LV EDV Index: 10.5   Diastolic: 1.2  MB/32 BV/H^1JU ESV/LV ESV   cm              Index: 29.4 ml/14ml/ m^2     RV Diastolic Dimension: 2.6   LV PW Systolic: EF Calculated: 07.5 %        cm   1.5 cm          LV Mass Index: 80 l/min*m^2   Septum          LV Length: 8.1 cm            LA/Aorta: 2   Diastolic: 1.2   cm              LVOT: 2.3 cm                 LA volume/Index: 58.7 ml   Septum                                       /80YD/K^0   Systolic: 1.4                                RA Area: 16.7 cm^2   cm      LV Mass: 171.75   g     Doppler Measurements & Calculations      MV Peak E-Wave: 1.57 AV Peak Velocity: 1.32 LVOT Peak Velocity: 0.95 m/s   m/s                  m/s                    LVOT Mean Velocity: 0.73 m/s                        AV Peak Gradient: 6.95 LVOT Peak Gradient: 3.6   MV Peak Gradient:    mmHg                   mmHgLVOT Mean Gradient: 2.3   9.9 mmHg             AV Mean Velocity: 0.95 mmHg   MV Mean Gradient:    m/s   4.3 mmHg             AV Mean Gradient: 4   MV Mean Velocity:    mmHg   0.92 m/s             AV VTI: 27.2 cm   MV Deceleration      AV Area   Time: 152.6 msec     (Continuity):3.42 cm^2 PV Peak Velocity: 1.21 m/s  MV P1/2t: 64.4 msec                         PV Peak Gradient: 5.88 mmHg   MVA by PHT:3.42 cm^2 LVOT VTI: 22.4 cm      PV Mean Velocity: 0.91 m/s   MV Area                                     PV Mean Gradient: 3.6 mmHg   (continuity): 3.4   cm^2             Last Stress Test: 18  Paola Farmer MD     2018  6:45 PM  [address] [facility] [address] [address]  [phone]                Pharmacologic Stress Nuclear Gated SPECT Study    Name: [facility] Account Number: [de-identified]    :  1950          Sex: male         Date of Study:  2018    Height: 5' 9\" (175.3 cm)         Weight: 218 lb (98.9 kg)     Ordering Provider: Elvie Chavarria MD          PCP: Marian Vallejo MD      Cardiologist: Elvie Graham. Regina Chavarria MD                Interpreting Physician:Oc Chavarria MD      __________________________________________________________________  _______________    Indication:   Detecting the presence and location of coronary artery disease    Clinical History:   Patient has no known history of coronary   artery disease. Resting ECG:    SD intsec, QRS int0.08 sec, QT int sec; HR 70 bpm  Atrial fibrillation with controlled ventricular response    Procedure:   Pharmacologic stress testing was performed with regadenoson 0.4   mg for 15 seconds. Additionally, low-level exercise was performed   along with the infusion.  The heart rate was 70 at baseline and   jonny to 123 beats during the infusion.  This corresponds to 80%   of maximum predicted heart rate.  The blood pressure at baseline   was 110/70 and blood pressure at the end of infusion was 116/70.    Blood pressure response was normal during the stress procedure. The patient experienced mild shortness of breath, leg heaviness,   and headache during the infusion. ECG during the infusion did not change. IMAGING: Myocardial perfusion imaging was performed at rest 30-35   minutes following the intravenous injection of 10.1 mCi of   (Tc-Sestamibi) followed by 10 ml of Normal Saline.  As per   infusion protocol, the patient was injected intravenously with   31.7 mCi of (Tc-Sestamibi) followed by 10 ml of Normal Saline.    Gated post-stress tomographic imaging was performed 20-25 minutes   after stress. FINDINGS: The overall quality of the study was excellent. Left ventricular cavity size was noted to be normal.    Rotational analog analysis demonstrated no patient motion or   abnormal extracardiac radioactivity. The gated SPECT stress imaging in the short, vertical long, and   horizontal long axis demonstrated normal homogeneous tracer   distribution throughout the myocardium. The resting images show no change. Gated SPECT left ventricular ejection fraction was calculated to   be 82%, with normal myocardial thickening and wall motion. Impression:    1. ECG during the infusion did not change. 2. The myocardial perfusion imaging was normal.    3. Overall left ventricular systolic function was normal without   regional wall motion abnormalities. 4. Low risk  pharmacologic stress test.    Thank you for sending your patient to this [facility]. Electronically signed by Indio Abreu MD on 5/17/18 at 6:45   PM              I have independently reviewed all of the ECGs and rhythm strips per above     Assessment:     1.  Persistent atrial fibrillation, likely LSPAF  - CHADSVASC= 3 (HTN, DM, Age)  - their current [address] regiment includes Eliquis  - AF ablation with PVI + CTI + Roof line 8/1/2018- difficulty with R vein isolation, incomplete  - ERAF post ablation- discussed DCCV +/- AAD therapy  - we had an extensive discussion regarding options between rate and rhythm control and he is currently using Toprol for rate control I have spent a total of 30 minutes with the patient  reviewing the above stated recommendations.  And a total of >50% of that time involved face-to-face time providing counseling and or coordination of care with the other providers    ROQUE Ngo-MARYURI, [address] [facility]    CC: Dr Sweetie Alvarado

## 2021-03-04 ENCOUNTER — OFFICE VISIT (OUTPATIENT)
Dept: NON INVASIVE DIAGNOSTICS | Age: 71
End: 2021-03-04
Payer: MEDICARE

## 2021-03-04 VITALS
RESPIRATION RATE: 18 BRPM | HEIGHT: 70 IN | HEART RATE: 62 BPM | WEIGHT: 211 LBS | DIASTOLIC BLOOD PRESSURE: 84 MMHG | SYSTOLIC BLOOD PRESSURE: 136 MMHG | BODY MASS INDEX: 30.21 KG/M2

## 2021-03-04 DIAGNOSIS — I48.19 PERSISTENT ATRIAL FIBRILLATION (HCC): Primary | ICD-10-CM

## 2021-03-04 PROCEDURE — 93000 ELECTROCARDIOGRAM COMPLETE: CPT | Performed by: INTERNAL MEDICINE

## 2021-03-04 PROCEDURE — 99214 OFFICE O/P EST MOD 30 MIN: CPT | Performed by: NURSE PRACTITIONER

## 2021-03-30 ENCOUNTER — OFFICE VISIT (OUTPATIENT)
Dept: FAMILY MEDICINE CLINIC | Age: 71
End: 2021-03-30
Payer: MEDICARE

## 2021-03-30 VITALS
RESPIRATION RATE: 16 BRPM | HEIGHT: 69 IN | WEIGHT: 209 LBS | TEMPERATURE: 97.4 F | OXYGEN SATURATION: 98 % | DIASTOLIC BLOOD PRESSURE: 59 MMHG | SYSTOLIC BLOOD PRESSURE: 123 MMHG | HEART RATE: 61 BPM | BODY MASS INDEX: 30.96 KG/M2

## 2021-03-30 DIAGNOSIS — I48.0 PAROXYSMAL ATRIAL FIBRILLATION (HCC): ICD-10-CM

## 2021-03-30 DIAGNOSIS — Z79.899 MEDICATION MANAGEMENT: ICD-10-CM

## 2021-03-30 DIAGNOSIS — E78.2 MIXED HYPERLIPIDEMIA: ICD-10-CM

## 2021-03-30 DIAGNOSIS — G47.00 INSOMNIA, UNSPECIFIED TYPE: ICD-10-CM

## 2021-03-30 DIAGNOSIS — E11.65 CONTROLLED TYPE 2 DIABETES MELLITUS WITH HYPERGLYCEMIA, UNSPECIFIED WHETHER LONG TERM INSULIN USE (HCC): Primary | ICD-10-CM

## 2021-03-30 LAB — HBA1C MFR BLD: 8.4 %

## 2021-03-30 PROCEDURE — 83036 HEMOGLOBIN GLYCOSYLATED A1C: CPT | Performed by: FAMILY MEDICINE

## 2021-03-30 PROCEDURE — 3052F HG A1C>EQUAL 8.0%<EQUAL 9.0%: CPT | Performed by: FAMILY MEDICINE

## 2021-03-30 PROCEDURE — 99214 OFFICE O/P EST MOD 30 MIN: CPT | Performed by: FAMILY MEDICINE

## 2021-03-30 RX ORDER — LISINOPRIL AND HYDROCHLOROTHIAZIDE 25; 20 MG/1; MG/1
1 TABLET ORAL DAILY
Qty: 90 TABLET | Refills: 3 | Status: SHIPPED
Start: 2021-03-30 | End: 2021-06-07 | Stop reason: SDUPTHER

## 2021-03-30 RX ORDER — POTASSIUM CHLORIDE 1500 MG/1
TABLET, FILM COATED, EXTENDED RELEASE ORAL
COMMUNITY
Start: 2021-03-06 | End: 2021-06-09 | Stop reason: SDUPTHER

## 2021-03-30 ASSESSMENT — PATIENT HEALTH QUESTIONNAIRE - PHQ9
SUM OF ALL RESPONSES TO PHQ9 QUESTIONS 1 & 2: 0
SUM OF ALL RESPONSES TO PHQ QUESTIONS 1-9: 0
2. FEELING DOWN, DEPRESSED OR HOPELESS: 0
SUM OF ALL RESPONSES TO PHQ QUESTIONS 1-9: 0

## 2021-03-30 NOTE — PROGRESS NOTES
CC   Chief Complaint   Patient presents with    Diabetes     HPI:   Patient comes in today for the below issue(s). Atrial fibrillation  Follow up  Notes in chart from cardiology reviewed. Is on anticoagulation (Eliquis)   Although he feels he is mostly in sinus rhythm he also admits to a few episodes of palpitation. Overall feels he is doing well at the present time. He would like to get off of anticoagulation if possible  But otherwise reports no significant chest pains, shortness of breath, syncope    Insomnia  Follow up  Tried on trazodone   States overall doing better. Does use occasional melatonin with it      GERD   Follow-up  Patient doing well. Continues to take Nexium, but only taking it intermittently  States heartburn is resolved. No dysphagia or odontophagia. No abdominal pain. Bowels are regular    Diabetes:  Type 2  Chronic issue, present for years  Patient feels well. Issue is  controlled. Patient does not have complaints or concerns today. Medications reviewed. Currently on Glucophage XR   Taking all medications and tolerating well. Glucose screens being checked  No  States made changes to diet- more whole grain and less white bread.    hypoglycemic episodes no  Patient is reporting polyphagia. Denies other SSx  Patient is taking ASA No: Eliquis   Taking  Ace Inhibitor/ARB. Yes  Patient is  on appropriately-dosed statin. Patient does not see Podiatry regularly. Patient does not smoke. Most recent labs reviewed with patient. Lab Results   Component Value Date    LABA1C 8.4 03/30/2021     No results found for: EAG  Lab Results   Component Value Date    LDLCALC 111 (H) 12/08/2020   The 10-year ASCVD risk score (Aquilino Terry et al., 2013) is: 36.4%    Values used to calculate the score:      Age: 80 years      Sex: Male      Is Non- : No      Diabetic: Yes      Tobacco smoker: No      Systolic Blood Pressure: 200 mmHg      Is BP treated:  Yes HDL Cholesterol: 41 mg/dL      Total Cholesterol: 192 mg/dL  He is on optimal dose of a statin         Past Medical History:   Diagnosis Date    Allergic rhinitis     Atrial fibrillation ([facility]     follows with EP    Cholelithiasis     noted on 3/11 CT    COPD (chronic obstructive pulmonary disease) ([facility]     early stages    Diabetes type 2, controlled ([address]     Diverticulosis     noted on 3/11 CT    GERD (gastroesophageal reflux disease)     Hyperlipidemia     Hypertension      Social History     Tobacco Use    Smoking status: Former Smoker     Packs/day: 1.50     Years: 15.00     Pack years: 22.50     Types: Cigarettes     Quit date: 1997     Years since quittin.0    Smokeless tobacco: Never Used   Substance Use Topics    Alcohol use: Yes     Comment: moderate alcohol use / drinks 12 beers or shots liquor per week    Drug use: No         PE:  VS:  BP (!) 123/59   Pulse 61   Temp 97.4 °F (36.3 °C) (Temporal)   Resp 16   Ht 5' 9\" (1.753 m)   Wt 209 lb (94.8 kg)   SpO2 98%   BMI 30.86 kg/m²   Physical Exam  General: Well nourished, well developed, no acute distress  Eyes:  PERRL   Conjunctiva unremarkable   Neck:  Supple   No palpable cervical lymphoadenopathy   Thyroid without mass or enlargement   No carotid bruits  Resp: Lungs CTAB   Equal and adequate air exchange without accessory muscle use   No rales, rhonchi or wheeze  CV: S1S2 regular rate and rhythm, no ectopy   No murmur   Intact distal pulses   No edema  GI: Abdomen Soft, non tender, non distended   Normoactive bowel sounds  MS: Physiologic ROM of all extremities    Intact distal pulses   No clubbing or cyanosis   Skin Warm and dry; no rash or lesion  Neuro: Alert and oriented; normal and intact dtr's   Psych: Euthymic mood, congruent affect       Assessment (including specific orders/meds/labs):      Felipe Singer was seen today for diabetes.     Diagnoses and all orders for this visit:    Controlled type 2 diabetes mellitus with hyperglycemia, unspecified whether long term insulin use (HCC)  -     POCT glycosylated hemoglobin (Hb A1C)  -     Lipid Panel; Future  -     Hemoglobin A1C; Future  -     CBC Auto Differential; Future  -     Comprehensive Metabolic Panel; Future  -     Microalbumin / Creatinine Urine Ratio; Future    Paroxysmal atrial fibrillation (HCC)    Mixed hyperlipidemia    Insomnia, unspecified type    Medication management  -     lisinopril-hydroCHLOROthiazide (PRINZIDE;ZESTORETIC) 20-25 MG per tablet; Take 1 tablet by mouth daily        Plan:     Overall doing well. A1c has improved. We will continue to monitor for now on current regimen of medications. He will have labs checked before next visit    A. fib is ongoing, I do not think he is is asymptomatic as he would prefer, but I think he continues to do well overall. No changes being recommended. Stay on anticoagulation    Lipids are being controlled with optimal dose statin. He still remains at high risk for adverse cardiovascular events. Continue to follow with cardiology    Insomnia is ongoing but stable    Patient was counseled regarding COVID-19 vaccination, he declines    Call or go to ED immediately if symptoms worsen or persist. Advised patient to call with any new medication issues, and, as applicable, read all Rx info from pharmacy to assure aware ofall possible risks and side effects of medication before taking. Patient and/or guardian given opportunity to ask questions/raise concerns. He verbalized comfort and understanding of instructions. Follow Up:     Return in about 3 months (around 6/30/2021) for AWV with me. or sooner if the above issues change unexpectedly or new issues develop     This document was prepared at least partially through the use of[employer] recognition software. Although effort is taken to assure the accuracy of this document, it is possible that grammatical, syntax,  or spelling errors may occur.       Electronically signed by Elias Duenas MD FAAFP

## 2021-04-06 DIAGNOSIS — Z79.899 MEDICATION MANAGEMENT: ICD-10-CM

## 2021-04-06 RX ORDER — ATORVASTATIN CALCIUM 80 MG/1
80 TABLET, FILM COATED ORAL DAILY
Qty: 90 TABLET | Refills: 0 | Status: SHIPPED
Start: 2021-04-06 | End: 2021-06-07 | Stop reason: SDUPTHER

## 2021-06-07 DIAGNOSIS — Z79.899 MEDICATION MANAGEMENT: ICD-10-CM

## 2021-06-07 RX ORDER — LISINOPRIL AND HYDROCHLOROTHIAZIDE 25; 20 MG/1; MG/1
1 TABLET ORAL DAILY
Qty: 90 TABLET | Refills: 3 | Status: SHIPPED
Start: 2021-06-07 | End: 2022-02-22 | Stop reason: DRUGHIGH

## 2021-06-16 ENCOUNTER — NURSE ONLY (OUTPATIENT)
Dept: FAMILY MEDICINE CLINIC | Age: 71
End: 2021-06-16
Payer: MEDICARE

## 2021-06-16 DIAGNOSIS — E11.9 CONTROLLED TYPE 2 DIABETES MELLITUS WITHOUT COMPLICATION, UNSPECIFIED WHETHER LONG TERM INSULIN USE (HCC): ICD-10-CM

## 2021-06-16 DIAGNOSIS — E11.65 CONTROLLED TYPE 2 DIABETES MELLITUS WITH HYPERGLYCEMIA, UNSPECIFIED WHETHER LONG TERM INSULIN USE (HCC): ICD-10-CM

## 2021-06-16 LAB
ALBUMIN SERPL-MCNC: 4.8 G/DL (ref 3.5–5.2)
ALP BLD-CCNC: 60 U/L (ref 40–129)
ALT SERPL-CCNC: 18 U/L (ref 0–40)
ANION GAP SERPL CALCULATED.3IONS-SCNC: 12 MMOL/L (ref 7–16)
AST SERPL-CCNC: 18 U/L (ref 0–39)
BASOPHILS ABSOLUTE: 0.04 E9/L (ref 0–0.2)
BASOPHILS RELATIVE PERCENT: 0.6 % (ref 0–2)
BILIRUB SERPL-MCNC: 0.4 MG/DL (ref 0–1.2)
BUN BLDV-MCNC: 16 MG/DL (ref 6–23)
CALCIUM SERPL-MCNC: 9.9 MG/DL (ref 8.6–10.2)
CHLORIDE BLD-SCNC: 96 MMOL/L (ref 98–107)
CHOLESTEROL, TOTAL: 185 MG/DL (ref 0–199)
CO2: 30 MMOL/L (ref 22–29)
CREAT SERPL-MCNC: 1 MG/DL (ref 0.7–1.2)
CREATININE URINE: 94 MG/DL (ref 40–278)
EOSINOPHILS ABSOLUTE: 0.47 E9/L (ref 0.05–0.5)
EOSINOPHILS RELATIVE PERCENT: 7 % (ref 0–6)
GFR AFRICAN AMERICAN: >60
GFR NON-AFRICAN AMERICAN: >60 ML/MIN/1.73
GLUCOSE BLD-MCNC: 186 MG/DL (ref 74–99)
HBA1C MFR BLD: 9 % (ref 4–5.6)
HCT VFR BLD CALC: 41.6 % (ref 37–54)
HDLC SERPL-MCNC: 35 MG/DL
HEMOGLOBIN: 13.9 G/DL (ref 12.5–16.5)
IMMATURE GRANULOCYTES #: 0.04 E9/L
IMMATURE GRANULOCYTES %: 0.6 % (ref 0–5)
LDL CHOLESTEROL CALCULATED: 100 MG/DL (ref 0–99)
LYMPHOCYTES ABSOLUTE: 1.94 E9/L (ref 1.5–4)
LYMPHOCYTES RELATIVE PERCENT: 28.9 % (ref 20–42)
MCH RBC QN AUTO: 29.7 PG (ref 26–35)
MCHC RBC AUTO-ENTMCNC: 33.4 % (ref 32–34.5)
MCV RBC AUTO: 88.9 FL (ref 80–99.9)
MICROALBUMIN UR-MCNC: <12 MG/L
MICROALBUMIN/CREAT UR-RTO: ABNORMAL (ref 0–30)
MONOCYTES ABSOLUTE: 0.64 E9/L (ref 0.1–0.95)
MONOCYTES RELATIVE PERCENT: 9.5 % (ref 2–12)
NEUTROPHILS ABSOLUTE: 3.59 E9/L (ref 1.8–7.3)
NEUTROPHILS RELATIVE PERCENT: 53.4 % (ref 43–80)
PDW BLD-RTO: 12.9 FL (ref 11.5–15)
PLATELET # BLD: 193 E9/L (ref 130–450)
PMV BLD AUTO: 11 FL (ref 7–12)
POTASSIUM SERPL-SCNC: 4 MMOL/L (ref 3.5–5)
RBC # BLD: 4.68 E12/L (ref 3.8–5.8)
SODIUM BLD-SCNC: 138 MMOL/L (ref 132–146)
TOTAL PROTEIN: 8.1 G/DL (ref 6.4–8.3)
TRIGL SERPL-MCNC: 251 MG/DL (ref 0–149)
VLDLC SERPL CALC-MCNC: 50 MG/DL
WBC # BLD: 6.7 E9/L (ref 4.5–11.5)

## 2021-06-16 PROCEDURE — 36415 COLL VENOUS BLD VENIPUNCTURE: CPT | Performed by: FAMILY MEDICINE

## 2021-06-30 ENCOUNTER — OFFICE VISIT (OUTPATIENT)
Dept: FAMILY MEDICINE CLINIC | Age: 71
End: 2021-06-30
Payer: MEDICARE

## 2021-06-30 VITALS
DIASTOLIC BLOOD PRESSURE: 57 MMHG | OXYGEN SATURATION: 97 % | HEART RATE: 63 BPM | TEMPERATURE: 97.3 F | SYSTOLIC BLOOD PRESSURE: 126 MMHG | BODY MASS INDEX: 31.1 KG/M2 | RESPIRATION RATE: 16 BRPM | WEIGHT: 210 LBS | HEIGHT: 69 IN

## 2021-06-30 DIAGNOSIS — Z00.00 ROUTINE GENERAL MEDICAL EXAMINATION AT A HEALTH CARE FACILITY: Primary | ICD-10-CM

## 2021-06-30 DIAGNOSIS — I10 ESSENTIAL HYPERTENSION: ICD-10-CM

## 2021-06-30 DIAGNOSIS — I48.0 PAROXYSMAL ATRIAL FIBRILLATION (HCC): ICD-10-CM

## 2021-06-30 DIAGNOSIS — G47.00 INSOMNIA, UNSPECIFIED TYPE: ICD-10-CM

## 2021-06-30 DIAGNOSIS — E11.65 UNCONTROLLED TYPE 2 DIABETES MELLITUS WITH HYPERGLYCEMIA (HCC): ICD-10-CM

## 2021-06-30 PROCEDURE — G0439 PPPS, SUBSEQ VISIT: HCPCS | Performed by: FAMILY MEDICINE

## 2021-06-30 PROCEDURE — 3052F HG A1C>EQUAL 8.0%<EQUAL 9.0%: CPT | Performed by: FAMILY MEDICINE

## 2021-06-30 RX ORDER — TRAZODONE HYDROCHLORIDE 100 MG/1
150 TABLET ORAL NIGHTLY PRN
Qty: 90 TABLET | Refills: 1
Start: 2021-06-30 | End: 2021-10-14 | Stop reason: SDUPTHER

## 2021-06-30 RX ORDER — LISINOPRIL 20 MG/1
20 TABLET ORAL
COMMUNITY
End: 2022-03-10

## 2021-06-30 RX ORDER — PIOGLITAZONEHYDROCHLORIDE 30 MG/1
30 TABLET ORAL DAILY
Qty: 30 TABLET | Refills: 5 | Status: SHIPPED
Start: 2021-06-30 | End: 2021-11-29 | Stop reason: SDUPTHER

## 2021-06-30 SDOH — ECONOMIC STABILITY: FOOD INSECURITY: WITHIN THE PAST 12 MONTHS, THE FOOD YOU BOUGHT JUST DIDN'T LAST AND YOU DIDN'T HAVE MONEY TO GET MORE.: PATIENT DECLINED

## 2021-06-30 SDOH — ECONOMIC STABILITY: FOOD INSECURITY: WITHIN THE PAST 12 MONTHS, THE FOOD YOU BOUGHT JUST DIDN'T LAST AND YOU DIDN'T HAVE MONEY TO GET MORE.: NEVER TRUE

## 2021-06-30 SDOH — ECONOMIC STABILITY: FOOD INSECURITY: WITHIN THE PAST 12 MONTHS, YOU WORRIED THAT YOUR FOOD WOULD RUN OUT BEFORE YOU GOT MONEY TO BUY MORE.: PATIENT DECLINED

## 2021-06-30 SDOH — ECONOMIC STABILITY: FOOD INSECURITY: WITHIN THE PAST 12 MONTHS, YOU WORRIED THAT YOUR FOOD WOULD RUN OUT BEFORE YOU GOT MONEY TO BUY MORE.: NEVER TRUE

## 2021-06-30 ASSESSMENT — LIFESTYLE VARIABLES
HAS A RELATIVE, FRIEND, DOCTOR, OR ANOTHER HEALTH PROFESSIONAL EXPRESSED CONCERN ABOUT YOUR DRINKING OR SUGGESTED YOU CUT DOWN: NO
HOW OFTEN DURING THE LAST YEAR HAVE YOU NEEDED AN ALCOHOLIC DRINK FIRST THING IN THE MORNING TO GET YOURSELF GOING AFTER A NIGHT OF HEAVY DRINKING: NEVER
HAVE YOU OR SOMEONE ELSE BEEN INJURED AS A RESULT OF YOUR DRINKING: 0
HOW OFTEN DO YOU HAVE SIX OR MORE DRINKS ON ONE OCCASION: 0
HOW OFTEN DURING THE LAST YEAR HAVE YOU BEEN UNABLE TO REMEMBER WHAT HAPPENED THE NIGHT BEFORE BECAUSE YOU HAD BEEN DRINKING: 0
HOW OFTEN DURING THE LAST YEAR HAVE YOU FOUND THAT YOU WERE NOT ABLE TO STOP DRINKING ONCE YOU HAD STARTED: 0
HOW MANY STANDARD DRINKS CONTAINING ALCOHOL DO YOU HAVE ON A TYPICAL DAY: THREE OR FOUR
HOW OFTEN DURING THE LAST YEAR HAVE YOU BEEN UNABLE TO REMEMBER WHAT HAPPENED THE NIGHT BEFORE BECAUSE YOU HAD BEEN DRINKING: NEVER
AUDIT-C TOTAL SCORE: 4
AUDIT-C TOTAL SCORE: 0
HOW OFTEN DURING THE LAST YEAR HAVE YOU FAILED TO DO WHAT WAS NORMALLY EXPECTED FROM YOU BECAUSE OF DRINKING: NEVER
HOW OFTEN DO YOU HAVE A DRINK CONTAINING ALCOHOL: TWO TO THREE TIMES A WEEK
HOW OFTEN DURING THE LAST YEAR HAVE YOU FOUND THAT YOU WERE NOT ABLE TO STOP DRINKING ONCE YOU HAD STARTED: NEVER
HAS A RELATIVE, FRIEND, DOCTOR, OR ANOTHER HEALTH PROFESSIONAL EXPRESSED CONCERN ABOUT YOUR DRINKING OR SUGGESTED YOU CUT DOWN: 0
HOW OFTEN DURING THE LAST YEAR HAVE YOU HAD A FEELING OF GUILT OR REMORSE AFTER DRINKING: NEVER
AUDIT TOTAL SCORE: 0
AUDIT TOTAL SCORE: 4
HAVE YOU OR SOMEONE ELSE BEEN INJURED AS A RESULT OF YOUR DRINKING: NO
HOW OFTEN DO YOU HAVE A DRINK CONTAINING ALCOHOL: 3
HOW OFTEN DURING THE LAST YEAR HAVE YOU NEEDED AN ALCOHOLIC DRINK FIRST THING IN THE MORNING TO GET YOURSELF GOING AFTER A NIGHT OF HEAVY DRINKING: 0
HOW MANY STANDARD DRINKS CONTAINING ALCOHOL DO YOU HAVE ON A TYPICAL DAY: 1
HOW OFTEN DO YOU HAVE SIX OR MORE DRINKS ON ONE OCCASION: NEVER
HOW OFTEN DURING THE LAST YEAR HAVE YOU HAD A FEELING OF GUILT OR REMORSE AFTER DRINKING: 0
HOW OFTEN DURING THE LAST YEAR HAVE YOU FAILED TO DO WHAT WAS NORMALLY EXPECTED FROM YOU BECAUSE OF DRINKING: 0

## 2021-06-30 ASSESSMENT — PATIENT HEALTH QUESTIONNAIRE - PHQ9
SUM OF ALL RESPONSES TO PHQ QUESTIONS 1-9: 0
1. LITTLE INTEREST OR PLEASURE IN DOING THINGS: 0
SUM OF ALL RESPONSES TO PHQ9 QUESTIONS 1 & 2: 0
2. FEELING DOWN, DEPRESSED OR HOPELESS: 0
SUM OF ALL RESPONSES TO PHQ QUESTIONS 1-9: 0
SUM OF ALL RESPONSES TO PHQ QUESTIONS 1-9: 0

## 2021-06-30 ASSESSMENT — SOCIAL DETERMINANTS OF HEALTH (SDOH): HOW HARD IS IT FOR YOU TO PAY FOR THE VERY BASICS LIKE FOOD, HOUSING, MEDICAL CARE, AND HEATING?: NOT HARD AT ALL

## 2021-06-30 NOTE — PATIENT INSTRUCTIONS
Personalized Preventive Plan for Ana Quintana - 6/30/2021  Medicare offers a range of preventive health benefits. Some of the tests and screenings are paid in full while other may be subject to a deductible, co-insurance, and/or copay. Some of these benefits include a comprehensive review of your medical history including lifestyle, illnesses that may run in your family, and various assessments and screenings as appropriate. After reviewing your medical record and screening and assessments performed today your provider may have ordered immunizations, labs, imaging, and/or referrals for you. A list of these orders (if applicable) as well as your Preventive Care list are included within your After Visit Summary for your review. Other Preventive Recommendations:    · A preventive eye exam performed by an eye specialist is recommended every 1-2 years to screen for glaucoma; cataracts, macular degeneration, and other eye disorders. · A preventive dental visit is recommended every 6 months. · Try to get at least 150 minutes of exercise per week or 10,000 steps per day on a pedometer . · Order or download the FREE \"Exercise & Physical Activity: Your Everyday Guide\" from [employer]. Call 9-586.312.1323 or search [employer] online. · You need 5299-1995 mg of calcium and 1158-0390 IU of vitamin D per day. It is possible to meet your calcium requirement with diet alone, but a vitamin D supplement is usually necessary to meet this goal.  · When exposed to the sun, use a sunscreen that protects against both UVA and UVB radiation with an SPF of 30 or greater. Reapply every 2 to 3 hours or after sweating, drying off with a towel, or swimming. · Always wear a seat belt when traveling in a car. Always wear a helmet when riding a bicycle or motorcycle.

## 2021-06-30 NOTE — PROGRESS NOTES
medication    No Known Allergies      Prior to Visit Medications    Medication Sig Taking?  Authorizing Provider   pioglitazone (ACTOS) 30 MG tablet Take 1 tablet by mouth daily Yes Jeannie Montano MD   traZODone (DESYREL) 100 MG tablet Take 1.5 tablets by mouth nightly as needed for Sleep Yes Jeannie Montano MD   potassium chloride (KLOR-CON M) 20 MEQ TBCR extended release tablet Take 1 tablet by mouth daily Yes Jeannie Montano MD   lisinopril-hydroCHLOROthiazide (PRINZIDE;ZESTORETIC) 20-25 MG per tablet Take 1 tablet by mouth daily Yes Jeannie Montano MD   atorvastatin (LIPITOR) 80 MG tablet Take 1 tablet by mouth daily Yes Jeannie Montano MD   metoprolol succinate (TOPROL XL) 25 MG extended release tablet TAKE ONE TABLET BY MOUTH EVERY DAY Yes Jeannie Montano MD   apixaban (ELIQUIS) 5 MG TABS tablet Take 1 tablet by mouth 2 times daily Yes Jeannie Montano MD   metFORMIN (GLUCOPHAGE-XR) 500 MG extended release tablet Take 2 tablets by mouth daily (with breakfast) Yes Jeannie Montano MD   magnesium oxide (MAG-OX) 400 (240 Mg) MG tablet Take 1 tablet by mouth 2 times daily Yes Jeannie Montano MD   Opal Maker LANCETS 13J MISC testing once daily Yes Jeannie Montano MD   ONE TOUCH ULTRA TEST strip TEST ONE TIME DAILY  Yes Jeannie Montano MD   esomeprazole (NEXIUM) 40 MG delayed release capsule Take 40 mg by mouth daily Instructed to take morning of surgery with a sip of water Yes Historical Provider, MD   lisinopril (PRINIVIL;ZESTRIL) 20 MG tablet Take 20 mg by mouth  Historical Provider, MD   Melatonin 10 MG CAPS Take by mouth nightly  Historical Provider, MD         Past Medical History:   Diagnosis Date    Allergic rhinitis     Atrial fibrillation ([facility] [address]     follows with EP    Cholelithiasis     noted on 3/11 CT    COPD (chronic obstructive pulmonary disease) ([facility] [address]     early stages    Diabetes type 2, controlled ([facility] [address]     Diverticulosis     noted on 3/11 CT    GERD (gastroesophageal reflux disease)     Hyperlipidemia     Hypertension        Past Surgical History:   Procedure Laterality Date    ABLATION OF DYSRHYTHMIC FOCUS  08/01/2018    Afib/Aflutter ablation    APPENDECTOMY      COLONOSCOPY           Family History   Problem Relation Age of Onset    Dementia Mother     Heart Disease Father     Other Brother         elevated PSA    Cancer Brother         T cell lymphoma        CareTeam (Including outside providers/suppliers regularly involved in providing care):   Patient Care Team:  Harsh Macias MD as PCP - General ([address] [place] [address])  Harsh Macias MD as PCP - [facility] Provider  Matthew Engle MD as Consulting Physician (Cardiology)  Minda Turner DO as Consulting Physician (Electrophysiology)    Wt Readings from Last 3 Encounters:   06/30/21 210 lb (95.3 kg)   03/30/21 209 lb (94.8 kg)   03/04/21 211 lb (95.7 kg)     Vitals:    06/30/21 0909   BP: (!) 126/57   Pulse: 63   Resp: 16   Temp: 97.3 °F (36.3 °C)   TempSrc: Temporal   SpO2: 97%   Weight: 210 lb (95.3 kg)   Height: 5' 9\" (1.753 m)     Body mass index is 31.01 kg/m². Based upon direct observation of the patient, evaluation of cognition reveals recent and remote memory intact.     General: Well nourished, well developed, no acute distress  Eyes:  PERRL   Conjunctiva unremarkable   ENT:  Oral:  mucosa moist and pink             no posterior pharyngeal drainage     no posterior pharyngeal exudate  Neck:  Supple   No palpable cervical lymphoadenopathy   Thyroid without mass or enlargement  Resp: Lungs CTAB   Equal and adequate air exchange without accessory muscle use   No rales, rhonchi or wheeze  CV: S1S2 RRR   No murmur   Intact distal pulses   No edema  GI: Abdomen Soft, non tender, non distended   Normoactive bowel sounds   No palpable hepatosplenomegaly  MS: Physiologic ROM of all extremities    Intact distal pulses   No clubbing or cyanosis   Skin Warm and dry; no rash or lesion  Neuro: Alert and oriented; normal and intact dtr's    Monofilament is intact bilateral lower extremities  Psych: Euthymic mood, congruent affect       Patient's complete Health Risk Assessment and screening values have been reviewed and are found in Flowsheets. The following problems were reviewed today and where indicated follow up appointments were made and/or referrals ordered.     Positive Risk Factor Screenings with Interventions:           Health Habits/Nutrition:  Health Habits/Nutrition  Do you exercise for at least 20 minutes 2-3 times per week?: Yes  Have you lost any weight without trying in the past 3 months?: No  Do you eat only one meal per day?: No  Have you seen the dentist within the past year?: Yes  Body mass index: (!) 31.01  Health Habits/Nutrition Interventions:  · Nutritional issues:  He will continue to work on his weight       Personalized Preventive Plan   Current Health Maintenance Status  Immunization History   Administered Date(s) Administered    Influenza 12/17/2012, 11/05/2013    Influenza Vaccine, unspecified formulation 11/29/2016    Influenza Virus Vaccine 11/05/2013, 09/25/2014, 02/04/2016, 11/30/2016, 10/01/2018    Influenza, High Dose (Fluzone 65 yrs and older) 02/04/2016, 10/29/2019, 11/23/2020    Influenza, Quadv, adjuvanted, 65 yrs +, IM, PF (Fluad) 11/23/2020    Influenza, Triv, inactivated, subunit, adjuvanted, IM (Fluad 65 yrs and older) 11/06/2017, 10/02/2018    Pneumococcal Conjugate 13-valent ([id number]) 02/16/2017    Pneumococcal Polysaccharide ([id number]) 02/04/2016    Td vaccine (adult) 10/14/2003    Td, unspecified formulation 02/08/2014    Tdap (Boostrix, Adacel) 10/14/2003, 02/08/2014    Zoster Live (Zostavax) 03/05/2013        Health Maintenance   Topic Date Due    Hepatitis C screen  Never done    COVID-19 Vaccine (1) Never done    Shingles Vaccine (2 of 3) 04/30/2013    Annual Wellness Visit (AWV)  Never done    Diabetic retinal exam  01/09/2020    A1C test otherwise stable. Operative for questions provided. Verbalized comfort and understanding of plan    Return in about 3 months (around 9/30/2021), or if symptoms worsen or fail to improve, for DM check.

## 2021-07-05 DIAGNOSIS — Z79.899 MEDICATION MANAGEMENT: ICD-10-CM

## 2021-07-06 RX ORDER — ATORVASTATIN CALCIUM 80 MG/1
80 TABLET, FILM COATED ORAL DAILY
Qty: 90 TABLET | Refills: 1 | Status: SHIPPED
Start: 2021-07-06 | End: 2022-01-03 | Stop reason: SDUPTHER

## 2021-10-14 ENCOUNTER — OFFICE VISIT (OUTPATIENT)
Dept: FAMILY MEDICINE CLINIC | Age: 71
End: 2021-10-14
Payer: MEDICARE

## 2021-10-14 VITALS
WEIGHT: 201 LBS | RESPIRATION RATE: 18 BRPM | HEIGHT: 69 IN | DIASTOLIC BLOOD PRESSURE: 49 MMHG | BODY MASS INDEX: 29.77 KG/M2 | OXYGEN SATURATION: 97 % | TEMPERATURE: 97.1 F | HEART RATE: 57 BPM | SYSTOLIC BLOOD PRESSURE: 111 MMHG

## 2021-10-14 DIAGNOSIS — E11.65 UNCONTROLLED TYPE 2 DIABETES MELLITUS WITH HYPERGLYCEMIA (HCC): Primary | ICD-10-CM

## 2021-10-14 DIAGNOSIS — G47.00 INSOMNIA, UNSPECIFIED TYPE: ICD-10-CM

## 2021-10-14 DIAGNOSIS — I10 ESSENTIAL HYPERTENSION: ICD-10-CM

## 2021-10-14 DIAGNOSIS — I48.0 PAROXYSMAL ATRIAL FIBRILLATION (HCC): ICD-10-CM

## 2021-10-14 DIAGNOSIS — Z79.899 MEDICATION MANAGEMENT: ICD-10-CM

## 2021-10-14 PROBLEM — G47.30 SLEEP-DISORDERED BREATHING: Status: RESOLVED | Noted: 2018-06-06 | Resolved: 2021-10-14

## 2021-10-14 LAB — HBA1C MFR BLD: 7.3 %

## 2021-10-14 PROCEDURE — 83036 HEMOGLOBIN GLYCOSYLATED A1C: CPT | Performed by: FAMILY MEDICINE

## 2021-10-14 PROCEDURE — 3051F HG A1C>EQUAL 7.0%<8.0%: CPT | Performed by: FAMILY MEDICINE

## 2021-10-14 PROCEDURE — 99214 OFFICE O/P EST MOD 30 MIN: CPT | Performed by: FAMILY MEDICINE

## 2021-10-14 RX ORDER — POTASSIUM CHLORIDE 1500 MG/1
20 TABLET, FILM COATED, EXTENDED RELEASE ORAL DAILY
Qty: 90 TABLET | Refills: 1 | Status: SHIPPED | OUTPATIENT
Start: 2021-10-14

## 2021-10-14 RX ORDER — TRAZODONE HYDROCHLORIDE 100 MG/1
150 TABLET ORAL NIGHTLY PRN
Qty: 90 TABLET | Refills: 1 | Status: SHIPPED
Start: 2021-10-14 | End: 2022-07-09 | Stop reason: SDUPTHER

## 2021-10-14 RX ORDER — METOPROLOL SUCCINATE 25 MG/1
TABLET, EXTENDED RELEASE ORAL
Qty: 180 TABLET | Refills: 1 | Status: SHIPPED
Start: 2021-10-14 | End: 2022-02-22 | Stop reason: SDUPTHER

## 2021-10-14 NOTE — PROGRESS NOTES
CC   Chief Complaint   Patient presents with    Diabetes     HPI:   Patient comes in today for the below issue(s). Diabetes:  Type 2  Chronic issue, present for years  Patient feels well. Issue is better controlled. Patient does not have complaints or concerns today. Medications reviewed. Currently on Glucophage XR, Actos. We have tried other meds that he has stopped due to financial considerations. Taking all medications and tolerating well. Glucose screens being checked  Yes- no log though. States was 160 today. hypoglycemic episodes no  Patient is reporting intermittent numbness and tingling in his extremities but otherwise denies no other symptoms such as vision changes polyuria polydipsia or polyphagia  Most recent labs reviewed with patient. Lab Results   Component Value Date    LABA1C 7.3 10/14/2021     No results found for: EAG        Afib, hyperlipidemia, hypertension  Follow up  Doing well. Does not think he has had any issues with A. fib since last assessed  Denies chest pain or palpitation  Blood pressure is low today, but he is asymptomatic. He is not checking his blood pressures at home  Reports compliance with meds  Still wants to get off Eliquis-  He cites cost is a concern    He says his orthopedic issues are improved with interventions. I never got any notes on this. However he says his back and shoulder pain have resolved. Insomnia is ongoing. He gets partial relief from trazodone. However, he has some fatigue the following day and he also gets diminishing return if he takes the medicine 2-3 days in a row. However, he feels that the issue is more or less manageable and he is not inclined to pursue anything      Review of Systems  Review of Systems   Constitutional: Positive for fatigue. Negative for chills and fever. HENT: Negative for trouble swallowing. Respiratory: Negative for shortness of breath.     Cardiovascular: Negative for chest pain and palpitations. Gastrointestinal: Negative for abdominal pain. Genitourinary: Negative for difficulty urinating. Musculoskeletal: Positive for arthralgias. Neurological: Negative for dizziness and headaches. Past Medical History:   Diagnosis Date    Allergic rhinitis     Atrial fibrillation ([address]     follows with EP    Cholelithiasis     noted on 3/11 CT    COPD (chronic obstructive pulmonary disease) ([facility] [address]     early stages    Diabetes type 2, controlled ([facility] [address]     Diverticulosis     noted on 3/11 CT    GERD (gastroesophageal reflux disease)     Hyperlipidemia     Hypertension          PE:  VS:  BP (!) 111/49   Pulse 57   Temp 97.1 °F (36.2 °C) (Temporal)   Resp 18   Ht 5' 9\" (1.753 m)   Wt 201 lb (91.2 kg)   SpO2 97%   BMI 29.68 kg/m²   Physical Exam  General: Well nourished, well developed, no acute distress  Eyes:  PERRL   Conjunctiva unremarkable   Neck:  Supple   No palpable cervical lymphoadenopathy   Thyroid without mass or enlargement   No carotid bruits  Resp: Lungs CTAB   Equal and adequate air exchange without accessory muscle use   No rales, rhonchi or wheeze  CV: S1S2 regular rate and rhythm, no ectopy   No murmur   Intact distal pulses   No edema  GI: Abdomen Soft, non tender, non distended   Normoactive bowel sounds  MS: Physiologic ROM of all extremities    Intact distal pulses   No clubbing or cyanosis   Skin Warm and dry; no rash or lesion  Neuro: Alert and oriented; normal and intact dtr's   Psych: Euthymic mood, congruent affect       Assessment (including specific orders/meds/labs):      Juliet Jose Eduardo was seen today for diabetes. Diagnoses and all orders for this visit:    Uncontrolled type 2 diabetes mellitus with hyperglycemia ([facility] [address]  -     POCT glycosylated hemoglobin (Hb A1C)  -     Comprehensive Metabolic Panel; Future  -     Hemoglobin A1C; Future  -     Lipid Panel;  Future  -     CBC Auto Differential; Future    Paroxysmal atrial fibrillation (HCC)    Insomnia, unspecified type  -     traZODone (DESYREL) 100 MG tablet; Take 1.5 tablets by mouth nightly as needed for Sleep    Essential hypertension  -     Comprehensive Metabolic Panel; Future  -     Lipid Panel; Future  -     CBC Auto Differential; Future    Medication management  -     potassium chloride (KLOR-CON M) 20 MEQ TBCR extended release tablet; Take 1 tablet by mouth daily  -     metoprolol succinate (TOPROL XL) 25 MG extended release tablet; TAKE ONE TABLET BY MOUTH EVERY DAY  -     apixaban (ELIQUIS) 5 MG TABS tablet; Take 1 tablet by mouth 2 times daily        Plan:     He is tolerating the Actos well. We will monitor for continued compliance with diet and activity level. Encouraged him to get a flu shot as well as a Covid vaccine. Questions were addressed regarding both of these. He remains contemplative. Clinically he appears to be in sinus rhythm. He should remain on anticoagulation and the risk of stroke or MI with A. Fib- even paroxysmal A. Fib-remains elevated and I encouraged him to stay on anticoagulation. Follow-up with cardiology as scheduled    Discussed other treatment options for the insomnia. He declines. He is comfortable with current arrangements. Check status of lipids before next visit    Routine meds refilled as indicated    Counseled regarding above diagnosis, including possible risks and complications,  especially if left uncontrolled. Counseled regarding the possible sideeffects, risks, benefits and alternatives to treatment; patient and/or guardian verbalizes understanding, agrees, feels comfortable with and wishes to proceed with above treatment plan. Diabetes has improved significantly. Call or go to ED immediately if symptoms worsen or persist. Advised patient to call with any new medication issues, and, as applicable, read all Rx info from pharmacy to assure aware ofall possible risks and side effects of medication before taking.      As applicable, educational materials and/or home exercises printed forpatient's review and were included in patient instructions on his/her After Visit Summary and given to patient at the end of visit. Patient and/or guardian given opportunity to ask questions/raise concerns. He verbalized comfort and understanding of instructions. Follow Up:     Return in about 4 months (around 2/14/2022), or if symptoms worsen or fail to improve, for DM check. or sooner if the above issues change unexpectedly or new issues develop     This document was prepared at least partially through the use ofYastice recognition software. Although effort is taken to assure the accuracy of this document, it is possible that grammatical, syntax,  or spelling errors may occur.       Electronically signed by Norma Jane MD [facility]

## 2021-10-15 ASSESSMENT — ENCOUNTER SYMPTOMS
SHORTNESS OF BREATH: 0
ABDOMINAL PAIN: 0
TROUBLE SWALLOWING: 0

## 2022-01-03 DIAGNOSIS — Z79.899 MEDICATION MANAGEMENT: ICD-10-CM

## 2022-01-04 RX ORDER — METFORMIN HYDROCHLORIDE 500 MG/1
1000 TABLET, EXTENDED RELEASE ORAL
Qty: 180 TABLET | Refills: 3 | Status: SHIPPED
Start: 2022-01-04 | End: 2022-05-31

## 2022-01-04 RX ORDER — ATORVASTATIN CALCIUM 80 MG/1
80 TABLET, FILM COATED ORAL DAILY
Qty: 90 TABLET | Refills: 1 | Status: SHIPPED
Start: 2022-01-04 | End: 2022-06-27 | Stop reason: SDUPTHER

## 2022-02-16 ENCOUNTER — NURSE ONLY (OUTPATIENT)
Dept: FAMILY MEDICINE CLINIC | Age: 72
End: 2022-02-16
Payer: MEDICARE

## 2022-02-16 DIAGNOSIS — E11.65 UNCONTROLLED TYPE 2 DIABETES MELLITUS WITH HYPERGLYCEMIA (HCC): ICD-10-CM

## 2022-02-16 DIAGNOSIS — E11.65 CONTROLLED TYPE 2 DIABETES MELLITUS WITH HYPERGLYCEMIA, UNSPECIFIED WHETHER LONG TERM INSULIN USE (HCC): ICD-10-CM

## 2022-02-16 DIAGNOSIS — I10 ESSENTIAL HYPERTENSION: ICD-10-CM

## 2022-02-16 LAB
ALBUMIN SERPL-MCNC: 4.6 G/DL (ref 3.5–5.2)
ALP BLD-CCNC: 61 U/L (ref 40–129)
ALT SERPL-CCNC: 14 U/L (ref 0–40)
ANION GAP SERPL CALCULATED.3IONS-SCNC: 12 MMOL/L (ref 7–16)
AST SERPL-CCNC: 19 U/L (ref 0–39)
BASOPHILS ABSOLUTE: 0.04 E9/L (ref 0–0.2)
BASOPHILS RELATIVE PERCENT: 0.7 % (ref 0–2)
BILIRUB SERPL-MCNC: 0.4 MG/DL (ref 0–1.2)
BUN BLDV-MCNC: 17 MG/DL (ref 6–23)
CALCIUM SERPL-MCNC: 10.2 MG/DL (ref 8.6–10.2)
CHLORIDE BLD-SCNC: 96 MMOL/L (ref 98–107)
CHOLESTEROL, TOTAL: 171 MG/DL (ref 0–199)
CO2: 27 MMOL/L (ref 22–29)
CREAT SERPL-MCNC: 1.3 MG/DL (ref 0.7–1.2)
EOSINOPHILS ABSOLUTE: 0.42 E9/L (ref 0.05–0.5)
EOSINOPHILS RELATIVE PERCENT: 7.2 % (ref 0–6)
GFR AFRICAN AMERICAN: >60
GFR NON-AFRICAN AMERICAN: 54 ML/MIN/1.73
GLUCOSE BLD-MCNC: 118 MG/DL (ref 74–99)
HBA1C MFR BLD: 6.7 % (ref 4–5.6)
HCT VFR BLD CALC: 39.4 % (ref 37–54)
HDLC SERPL-MCNC: 42 MG/DL
HEMOGLOBIN: 12.6 G/DL (ref 12.5–16.5)
IMMATURE GRANULOCYTES #: 0.02 E9/L
IMMATURE GRANULOCYTES %: 0.3 % (ref 0–5)
LDL CHOLESTEROL CALCULATED: 103 MG/DL (ref 0–99)
LYMPHOCYTES ABSOLUTE: 1.61 E9/L (ref 1.5–4)
LYMPHOCYTES RELATIVE PERCENT: 27.6 % (ref 20–42)
MCH RBC QN AUTO: 29.6 PG (ref 26–35)
MCHC RBC AUTO-ENTMCNC: 32 % (ref 32–34.5)
MCV RBC AUTO: 92.5 FL (ref 80–99.9)
MONOCYTES ABSOLUTE: 0.56 E9/L (ref 0.1–0.95)
MONOCYTES RELATIVE PERCENT: 9.6 % (ref 2–12)
NEUTROPHILS ABSOLUTE: 3.19 E9/L (ref 1.8–7.3)
NEUTROPHILS RELATIVE PERCENT: 54.6 % (ref 43–80)
PDW BLD-RTO: 14.1 FL (ref 11.5–15)
PLATELET # BLD: 196 E9/L (ref 130–450)
PMV BLD AUTO: 11.6 FL (ref 7–12)
POTASSIUM SERPL-SCNC: 4.1 MMOL/L (ref 3.5–5)
RBC # BLD: 4.26 E12/L (ref 3.8–5.8)
SODIUM BLD-SCNC: 135 MMOL/L (ref 132–146)
TOTAL PROTEIN: 8.1 G/DL (ref 6.4–8.3)
TRIGL SERPL-MCNC: 128 MG/DL (ref 0–149)
VLDLC SERPL CALC-MCNC: 26 MG/DL
WBC # BLD: 5.8 E9/L (ref 4.5–11.5)

## 2022-02-16 PROCEDURE — 36415 COLL VENOUS BLD VENIPUNCTURE: CPT | Performed by: FAMILY MEDICINE

## 2022-02-22 ENCOUNTER — HOSPITAL ENCOUNTER (OUTPATIENT)
Age: 72
Discharge: HOME | End: 2022-02-24
Payer: MEDICARE

## 2022-02-22 ENCOUNTER — HOSPITAL ENCOUNTER (OUTPATIENT)
Dept: GENERAL RADIOLOGY | Age: 72
Discharge: HOME | End: 2022-02-24
Payer: MEDICARE

## 2022-02-22 ENCOUNTER — TELEPHONE (OUTPATIENT)
Dept: ADMINISTRATIVE | Age: 72
End: 2022-02-22

## 2022-02-22 ENCOUNTER — OFFICE VISIT (OUTPATIENT)
Dept: FAMILY MEDICINE CLINIC | Age: 72
End: 2022-02-22
Payer: MEDICARE

## 2022-02-22 VITALS
HEIGHT: 69 IN | RESPIRATION RATE: 16 BRPM | SYSTOLIC BLOOD PRESSURE: 119 MMHG | TEMPERATURE: 97.4 F | WEIGHT: 195 LBS | BODY MASS INDEX: 28.88 KG/M2 | DIASTOLIC BLOOD PRESSURE: 55 MMHG | OXYGEN SATURATION: 98 % | HEART RATE: 60 BPM

## 2022-02-22 DIAGNOSIS — I48.0 PAROXYSMAL ATRIAL FIBRILLATION (HCC): ICD-10-CM

## 2022-02-22 DIAGNOSIS — R10.9 LEFT FLANK PAIN: Primary | ICD-10-CM

## 2022-02-22 DIAGNOSIS — E11.65 CONTROLLED TYPE 2 DIABETES MELLITUS WITH HYPERGLYCEMIA, UNSPECIFIED WHETHER LONG TERM INSULIN USE (HCC): ICD-10-CM

## 2022-02-22 DIAGNOSIS — E78.2 MIXED HYPERLIPIDEMIA: ICD-10-CM

## 2022-02-22 DIAGNOSIS — R42 POSITIONAL LIGHTHEADEDNESS: ICD-10-CM

## 2022-02-22 DIAGNOSIS — Z79.899 MEDICATION MANAGEMENT: ICD-10-CM

## 2022-02-22 DIAGNOSIS — R10.9 LEFT FLANK PAIN: ICD-10-CM

## 2022-02-22 LAB
BILIRUBIN, POC: NEGATIVE
BLOOD URINE, POC: NEGATIVE
CLARITY, POC: CLEAR
COLOR, POC: YELLOW
GLUCOSE URINE, POC: NEGATIVE
KETONES, POC: NEGATIVE
LEUKOCYTE EST, POC: NEGATIVE
NITRITE, POC: NEGATIVE
PH, POC: 7
PROTEIN, POC: NEGATIVE
SPECIFIC GRAVITY, POC: 1.01
UROBILINOGEN, POC: 0.2

## 2022-02-22 PROCEDURE — 99214 OFFICE O/P EST MOD 30 MIN: CPT | Performed by: FAMILY MEDICINE

## 2022-02-22 PROCEDURE — 81002 URINALYSIS NONAUTO W/O SCOPE: CPT | Performed by: FAMILY MEDICINE

## 2022-02-22 PROCEDURE — 72074 X-RAY EXAM THORAC SPINE4/>VW: CPT

## 2022-02-22 RX ORDER — LISINOPRIL AND HYDROCHLOROTHIAZIDE 20; 12.5 MG/1; MG/1
1 TABLET ORAL DAILY
Qty: 90 TABLET | Refills: 1 | Status: SHIPPED
Start: 2022-02-22 | End: 2022-08-14 | Stop reason: SDUPTHER

## 2022-02-22 RX ORDER — METOPROLOL SUCCINATE 25 MG/1
TABLET, EXTENDED RELEASE ORAL
Qty: 180 TABLET | Refills: 1 | Status: SHIPPED
Start: 2022-02-22 | End: 2022-03-15 | Stop reason: SDUPTHER

## 2022-02-22 ASSESSMENT — ENCOUNTER SYMPTOMS
TROUBLE SWALLOWING: 0
SHORTNESS OF BREATH: 0
ABDOMINAL PAIN: 0

## 2022-02-22 NOTE — TELEPHONE ENCOUNTER
NP scheduled from the [facility]. Patient Appointment Form:      PCP: Dr. Jed Hashimoto  Referring: Dr. Jed Hashimoto     Has the Patient:    Seen a Cardiologist? Yes Dr. Bere Garcia 2018    Had a heart catheterization? No Ablation 2018 [facility] Epic     Had heart surgery? Had a stress test or nuclear stress test? Yes 2018 Epic 2013 Epic     Had an echocardiogram? Yes 2019; 2018 2013 Epic     Had a vascular ultrasound? No    Had a 24/48 heart monitor or extended cardiac event monitor? No    Had recent blood work in the last 6 months? Yes 2/16/22 Epic PCP     Had a pacemaker/ICD/ILR implant? No    Seen an Electrophysiologist? Yes [facility] 2019 Epic  [facility] ablation 2018         Will send records via: All prior Cardiology and EP recs in [address] [place] [address] in [address]         Date & time of appointment:  3/10/22 @ 2:00 with Dr. Avril Garcia

## 2022-02-22 NOTE — PROGRESS NOTES
CC   Chief Complaint   Patient presents with    Diabetes     HPI:   Patient comes in today for the below issue(s). Left flank pain  New issue  Off and on for several months  Worse in AM and improves as day goes on  Tried topical anesthetic with [place]. Tylenol as well  No relation to movement   No hematuria or hx of kidney stones   Thinks feels like back could be an issue. Diabetes:  Type 2  Chronic issue, present for years  Patient feels well. Issue is better controlled. Patient does not have complaints or concerns today. Medications reviewed. Currently on Glucophage XR, Actos. We have tried other meds that he has stopped due to financial considerations. Taking all medications and tolerating well. Glucose screens being checked  Yes- no log though. States was  hypoglycemic episodes no  Patient is reporting intermittent numbness and tingling in his extremities but otherwise denies no other symptoms such as vision changes polyuria polydipsia or polyphagia  Most recent labs reviewed with patient. Lab Results   Component Value Date    LABA1C 6.7 (H) 02/16/2022     No results found for: EAG    Afib, hyperlipidemia, hypertension  Follow up  Doing well. Does not think he has had any issues with A. fib since last assessed  Denies chest pain or palpitation  Blood pressure is low today, and he is noting a degree of positional lightheadedness. He is not checking his blood pressures at home  Reports compliance with meds including DOAC  Needs cardiology appt- not seen in >2 years by EP. Review of Systems  Review of Systems   Constitutional: Positive for fatigue. Negative for chills and fever. HENT: Negative for trouble swallowing. Respiratory: Negative for shortness of breath. Cardiovascular: Negative for chest pain and palpitations. Gastrointestinal: Negative for abdominal pain. Genitourinary: Negative for difficulty urinating. Musculoskeletal: Positive for arthralgias. Neurological: Positive for light-headedness. Negative for dizziness and headaches. Past Medical History:   Diagnosis Date    Allergic rhinitis     Atrial fibrillation ([facility] [address]     follows with EP    Cholelithiasis     noted on 3/11 CT    COPD (chronic obstructive pulmonary disease) ([facility] [address]     early stages    Diabetes type 2, controlled ([facility] [address]     Diverticulosis     noted on 3/11 CT    GERD (gastroesophageal reflux disease)     Hyperlipidemia     Hypertension          PE:  VS:  BP (!) 119/55   Pulse 60   Temp 97.4 °F (36.3 °C) (Temporal)   Resp 16   Ht 5' 9\" (1.753 m)   Wt 195 lb (88.5 kg)   SpO2 98%   BMI 28.80 kg/m²   Physical Exam  General: Well nourished, well developed, no acute distress  Eyes:  PERRL   Conjunctiva unremarkable   Neck:  Supple   No palpable cervical lymphoadenopathy   Thyroid without mass or enlargement   No carotid bruits  Resp: Lungs CTAB   Equal and adequate air exchange without accessory muscle use   No rales, rhonchi or wheeze  CV: S1S2 regular rate and rhythm, no ectopy   No murmur   Intact distal pulses   No edema  GI: Abdomen Soft, non tender, non distended   Normoactive bowel sounds   No CVA tenderness  MS: Physiologic ROM of all extremities    Thoracic and lumbar spine normal on inspection and palpation. No increased paravertebral muscle tone   Intact distal pulses   No clubbing or cyanosis   Skin Warm and dry; no rash or lesion  Neuro: Alert and oriented; normal and intact dtr's   Psych: Euthymic mood, congruent affect     Data: CBC CMP lipids and A1c reviewed  Assessment (including specific orders/meds/labs):      Lindsay Menendez was seen today for diabetes. Diagnoses and all orders for this visit:    Left flank pain  -     POCT Urinalysis no Micro  -     XR THORACIC SPINE (MIN 4 VIEWS);  Future    Controlled type 2 diabetes mellitus with hyperglycemia, unspecified whether long term insulin use (HCC)    Paroxysmal atrial fibrillation (HCC)  -     metoprolol succinate (TOPROL XL) 25 MG extended release tablet; TAKE ONE TABLET BY MOUTH Gregory Ocampo MD, Cardiology, [place]    Mixed hyperlipidemia    Medication management  -     metoprolol succinate (TOPROL XL) 25 MG extended release tablet; TAKE ONE TABLET BY MOUTH EVERY DAY    Positional lightheadedness    Other orders  -     lisinopril-hydroCHLOROthiazide (PRINZIDE;ZESTORETIC) 20-12.5 MG per tablet; Take 1 tablet by mouth daily        Plan:     Diabetes doing much better. I congratulated  him on his efforts. He wants to know if he can come off of medication. I indicated another 3 to 4 months of glycemic control that is similar to this would make me comfortable with trying a dose reduction of perhaps Actos. The left flank pain is of uncertain significance. Urinalysis in the office was unremarkable. We will check plain film imaging of thoracic spine. Notify if issues worsen. Tylenol for pain. PAF is doing well. He reveals to me that he has not actually seen cardiology in almost 2 years and his electrophysiologist has left. Because he is overall stable, I will refer him to general cardiology for assessment and stratification. Because of the positional lightheadedness and his hypotension noted on presentation today, I will decrease the hydrochlorothiazide as above. Continue other antihypertensive regimen. Counseled regarding above diagnosis, including possible risks and complications,  especially if left uncontrolled. Counseled regarding the possible sideeffects, risks, benefits and alternatives to treatment; patient and/or guardian verbalizes understanding, agrees, feels comfortable with and wishes to proceed with above treatment plan. Call or go to ED immediately if symptoms worsen or persist. Advised patient to call with any new medication issues, and, as applicable, read all Rx info from pharmacy to assure aware ofall possible risks and side effects of medication before taking. As applicable, educational materials and/or home exercises printed forpatient's review and were included in patient instructions on his/her After Visit Summary and given to patient at the end of visit. Patient and/or guardian given opportunity to ask questions/raise concerns. He verbalized comfort and understanding of instructions. Follow Up:     Return in about 3 months (around 5/22/2022), or if symptoms worsen or fail to improve, for DM check. or sooner if the above issues change unexpectedly or new issues develop     This document was prepared at least partially through the use ofCellARideice recognition software. Although effort is taken to assure the accuracy of this document, it is possible that grammatical, syntax,  or spelling errors may occur.       Electronically signed by Greta Agustin MD [facility]

## 2022-03-10 ENCOUNTER — OFFICE VISIT (OUTPATIENT)
Dept: CARDIOLOGY CLINIC | Age: 72
End: 2022-03-10
Payer: MEDICARE

## 2022-03-10 VITALS
HEART RATE: 66 BPM | HEIGHT: 70 IN | WEIGHT: 198.3 LBS | BODY MASS INDEX: 28.39 KG/M2 | RESPIRATION RATE: 18 BRPM | SYSTOLIC BLOOD PRESSURE: 116 MMHG | DIASTOLIC BLOOD PRESSURE: 60 MMHG

## 2022-03-10 DIAGNOSIS — I48.91 ATRIAL FIBRILLATION, UNSPECIFIED TYPE (HCC): Primary | ICD-10-CM

## 2022-03-10 PROCEDURE — 99204 OFFICE O/P NEW MOD 45 MIN: CPT | Performed by: INTERNAL MEDICINE

## 2022-03-10 PROCEDURE — 93000 ELECTROCARDIOGRAM COMPLETE: CPT | Performed by: INTERNAL MEDICINE

## 2022-03-10 RX ORDER — ASCORBIC ACID 500 MG
500 TABLET ORAL DAILY
COMMUNITY

## 2022-03-10 NOTE — PROGRESS NOTES
OUTPATIENT CARDIOLOGY CONSULT    Name: Concetta Manzo    Age: 71 y.o. Date of Service: 3/10/2022    Reason for Consultation: Atrial fibrillation    Referring Physician: Louis Valdovinos MD    History of Present Illness:  Concetta Manzo is a 71 y.o. male who presents today for further evaluation of atrial fibrillation. Previously followed with EP, underwent extensive ablation of atrial fibrillation and atrial flutter that occurred intraprocedurally in 2018 with Dr. Kortney Gunn. Has had 2 cardioversions subsequently and has been maintaining sinus rhythm since 2019. He is referred to reestablish cardiac care, has not been seen since his cardioversion in 2019. He feels well. He denies chest pain, shortness of breath, palpitations, edema, syncope. Thinks he had a brief episode of atrial fibrillation sometime last year when he \"overdid it\", but resolved on its own. He checks his heart rhythm daily with a Cartia monitor. He tries to exercise daily, uses recumbent bike in the colder weather and walks a few miles per day in the warmer weather. He does woodworking. He does mention difficulty maintaining erection and is inquiring about use of PDE 5 inhibitors. Had a stress test in 2018 that was unremarkable and his EF is normal by echo in 2019. Review of Systems:  Complete review of systems otherwise negative except as described above.     Past Medical History:  Past Medical History:   Diagnosis Date    Allergic rhinitis     Atrial fibrillation ([address]     follows with EP    Cholelithiasis     noted on 3/11 CT    COPD (chronic obstructive pulmonary disease) ([address]     early stages    Diabetes type 2, controlled ([address]     Diverticulosis     noted on 3/11 CT    GERD (gastroesophageal reflux disease)     Hyperlipidemia     Hypertension        Past Surgical History:  Past Surgical History:   Procedure Laterality Date    ABLATION OF DYSRHYTHMIC FOCUS  08/01/2018    Afib/Aflutter ablation    APPENDECTOMY      COLONOSCOPY         Family History:  Family History   Problem Relation Age of Onset    Dementia Mother     Heart Disease Father     Other Brother         elevated PSA    Cancer Brother         T cell lymphoma        Social History:  Social History     Tobacco Use    Smoking status: Former Smoker     Packs/day: 1.50     Years: 15.00     Pack years: 22.50     Types: Cigarettes     Quit date: 1997     Years since quittin.9    Smokeless tobacco: Never Used   Vaping Use    Vaping Use: Never used   Substance Use Topics    Alcohol use: Yes     Comment: moderate alcohol use / drinks 12 beers or shots liquor per week    Drug use: No        Allergies:  No Known Allergies    Current Medications:    Current Outpatient Medications:     ascorbic acid (VITAMIN C) 500 MG tablet, Take 500 mg by mouth daily, Disp: , Rfl:     metoprolol succinate (TOPROL XL) 25 MG extended release tablet, TAKE ONE TABLET BY MOUTH EVERY DAY, Disp: 180 tablet, Rfl: 1    lisinopril-hydroCHLOROthiazide (PRINZIDE;ZESTORETIC) 20-12.5 MG per tablet, Take 1 tablet by mouth daily, Disp: 90 tablet, Rfl: 1    metFORMIN (GLUCOPHAGE-XR) 500 MG extended release tablet, Take 2 tablets by mouth daily (with breakfast), Disp: 180 tablet, Rfl: 3    atorvastatin (LIPITOR) 80 MG tablet, Take 1 tablet by mouth daily, Disp: 90 tablet, Rfl: 1    pioglitazone (ACTOS) 30 MG tablet, Take 1 tablet by mouth daily, Disp: 90 tablet, Rfl: 1    potassium chloride (KLOR-CON M) 20 MEQ TBCR extended release tablet, Take 1 tablet by mouth daily, Disp: 90 tablet, Rfl: 1    traZODone (DESYREL) 100 MG tablet, Take 1.5 tablets by mouth nightly as needed for Sleep, Disp: 90 tablet, Rfl: 1    apixaban (ELIQUIS) 5 MG TABS tablet, Take 1 tablet by mouth 2 times daily, Disp: 180 tablet, Rfl: 3    magnesium oxide (MAG-OX) 400 (240 Mg) MG tablet, Take 1 tablet by mouth 2 times daily, Disp: 60 tablet, Rfl: 5    esomeprazole (NEXIUM) 40 MG delayed release capsule, Take 40 mg by mouth daily Instructed to take morning of surgery with a sip of water, Disp: , Rfl:     Physical Exam:  /60   Pulse 66   Resp 18   Ht 5' 10\" (1.778 m)   Wt 198 lb 4.8 oz (89.9 kg)   BMI 28.45 kg/m²   Wt Readings from Last 3 Encounters:   03/10/22 198 lb 4.8 oz (89.9 kg)   02/22/22 195 lb (88.5 kg)   10/14/21 201 lb (91.2 kg)     Appearance: Well-appearing gentleman, awake, alert, no acute respiratory distress  Skin: Intact, no rash  Eyes: EOMI, no conjunctival erythema  ENMT: Moist mucous membranes. Neck: Supple, no elevated JVP, no carotid bruits  Lungs: Clear to auscultation bilaterally. No wheezes, rales, or rhonchi. Cardiac: Regular rhythm with a normal rate.   S1 & S2 normal, no murmurs  Abdomen: Soft, nontender, +bowel sounds  Extremities: Moves all extremities x 4, no lower extremity edema  Neurologic: No focal motor deficits apparent, normal mood and affect  Peripheral Pulses: Intact posterior tibial pulses bilaterally    Laboratory Tests:  Lab Results   Component Value Date    CREATININE 1.3 (H) 02/16/2022    BUN 17 02/16/2022     02/16/2022    K 4.1 02/16/2022    CL 96 (L) 02/16/2022    CO2 27 02/16/2022     Lab Results   Component Value Date    MG 1.9 12/08/2020     Lab Results   Component Value Date    WBC 5.8 02/16/2022    HGB 12.6 02/16/2022    HCT 39.4 02/16/2022    MCV 92.5 02/16/2022     02/16/2022     Lab Results   Component Value Date    ALT 14 02/16/2022    AST 19 02/16/2022    ALKPHOS 61 02/16/2022    BILITOT 0.4 02/16/2022     Lab Results   Component Value Date    CKTOTAL 134 03/24/2015     Lab Results   Component Value Date    INR 1.5 08/02/2018    INR 1.5 07/27/2018    PROTIME 16.7 (H) 08/02/2018    PROTIME 17.1 (H) 07/27/2018     Lab Results   Component Value Date    TSH 2.390 11/01/2019     Lab Results   Component Value Date    LABA1C 6.7 (H) 02/16/2022     No results found for: EAG  Lab Results   Component Value Date    CHOL 171 02/16/2022    CHOL 185 06/16/2021    CHOL 192 12/08/2020     Lab Results   Component Value Date    TRIG 128 02/16/2022    TRIG 251 (H) 06/16/2021    TRIG 199 (H) 12/08/2020     Lab Results   Component Value Date    HDL 42 02/16/2022    HDL 35 06/16/2021    HDL 41 12/08/2020     Lab Results   Component Value Date    LDLCALC 103 (H) 02/16/2022    LDLCALC 100 (H) 06/16/2021    LDLCALC 111 (H) 12/08/2020     Lab Results   Component Value Date    LABVLDL 26 02/16/2022    LABVLDL 50 06/16/2021    LABVLDL 40 12/08/2020     No results found for: CHOLHDLRATIO  No results for input(s): PROBNP in the last 72 hours. Cardiac Tests:  ECG:   3/10/2022: Sinus rhythm 66 bpm.  Normal axis and intervals. No ST or T wave changes. Echocardiogram:   Transthoracic echo 5/2019   Summary   Aortic valve opens well. Stage I diastolic dysfunction. Ejection fraction is visually estimated at 55-65%. Mild concentric left ventricular hypertrophy. The left atrium is mildly dilated. Atrial fibrillation   Structurally normal mitral valve. Physiologic and/or trace mitral regurgitation is present. The aortic valve appears mildly sclerotic. No pericardial effusion. Stress test:    Pharmacologic stress 5/2018    Gated SPECT left ventricular ejection fraction was calculated to be 82%, with normal myocardial thickening and wall motion.     Impression:    1. ECG during the infusion did not change. 2. The myocardial perfusion imaging was normal.    3. Overall left ventricular systolic function was normal without regional wall motion abnormalities. 4. Low risk  pharmacologic stress test.    Cardiac catheterization:     Orders Placed This Encounter   Procedures    EKG 12 Lead        Requested Prescriptions      No prescriptions requested or ordered in this encounter        ASSESSMENT / PLAN:  1. Persistent atrial fibrillation.   [id number] at least 4  · Extensive AF/AFL ablation with PVI and CTI and roofline 8/2018, subsequent ERAF  · Multiple cardioversions, 6/2018, 10/2018 (after ablation), most recently 5/2019  · Maintaining sinus  2. Hypertension, well controlled  3. Type 2 diabetes, oral medications A1c 6.7%  4. Hyperlipidemia  5. CKD creatinine 1.3    Recommendations:  Doing well from a cardiac standpoint. He is maintaining sinus rhythm and remaining physically active. · Continue metoprolol succinate  · Continue apixaban for stroke risk reduction -he asked if he could come off of it and I explained that the risk of stroke is still present and he remains at risk of recurrent A. Fib and stroke  · Low risk for use of PDE 5 inhibitors from my standpoint, he has good functional capacity and no active cardiac disease with reassuring cardiac testing in the past few years. Okay to use, would just watch for hypotension given recent episodes of dizziness and reduction of antihypertensive medications, defer prescribing of the medication to Dr. Siddharth Sloan  · Continue exercise regimen  · Risk factor modification  · Follow-up in 1 year or sooner if need arises    Thank you for allowing me to participate in your patient's care. Please feel free to contact me if you have any questions or concerns.     Obi Barragan MD, [facility]

## 2022-03-15 DIAGNOSIS — I48.0 PAROXYSMAL ATRIAL FIBRILLATION (HCC): ICD-10-CM

## 2022-03-15 DIAGNOSIS — Z79.899 MEDICATION MANAGEMENT: ICD-10-CM

## 2022-03-15 RX ORDER — METOPROLOL SUCCINATE 25 MG/1
25 TABLET, EXTENDED RELEASE ORAL DAILY
Qty: 90 TABLET | Refills: 3 | Status: SHIPPED | OUTPATIENT
Start: 2022-03-15

## 2022-05-31 ENCOUNTER — OFFICE VISIT (OUTPATIENT)
Dept: FAMILY MEDICINE CLINIC | Age: 72
End: 2022-05-31
Payer: MEDICARE

## 2022-05-31 VITALS
SYSTOLIC BLOOD PRESSURE: 125 MMHG | WEIGHT: 197 LBS | OXYGEN SATURATION: 97 % | HEIGHT: 69 IN | DIASTOLIC BLOOD PRESSURE: 55 MMHG | HEART RATE: 83 BPM | BODY MASS INDEX: 29.18 KG/M2 | RESPIRATION RATE: 18 BRPM | TEMPERATURE: 98.2 F

## 2022-05-31 DIAGNOSIS — I10 ESSENTIAL HYPERTENSION: ICD-10-CM

## 2022-05-31 DIAGNOSIS — I48.3 TYPICAL ATRIAL FLUTTER (HCC): ICD-10-CM

## 2022-05-31 DIAGNOSIS — R10.9 LEFT FLANK PAIN: ICD-10-CM

## 2022-05-31 DIAGNOSIS — E11.65 CONTROLLED TYPE 2 DIABETES MELLITUS WITH HYPERGLYCEMIA, UNSPECIFIED WHETHER LONG TERM INSULIN USE (HCC): Primary | ICD-10-CM

## 2022-05-31 LAB
CHP ED QC CHECK: NORMAL
GLUCOSE BLD-MCNC: 127 MG/DL
HBA1C MFR BLD: 6.3 %

## 2022-05-31 PROCEDURE — 3044F HG A1C LEVEL LT 7.0%: CPT | Performed by: FAMILY MEDICINE

## 2022-05-31 PROCEDURE — 83036 HEMOGLOBIN GLYCOSYLATED A1C: CPT | Performed by: FAMILY MEDICINE

## 2022-05-31 PROCEDURE — 82962 GLUCOSE BLOOD TEST: CPT | Performed by: FAMILY MEDICINE

## 2022-05-31 PROCEDURE — 99214 OFFICE O/P EST MOD 30 MIN: CPT | Performed by: FAMILY MEDICINE

## 2022-05-31 PROCEDURE — 1123F ACP DISCUSS/DSCN MKR DOCD: CPT | Performed by: FAMILY MEDICINE

## 2022-05-31 RX ORDER — METFORMIN HYDROCHLORIDE 500 MG/1
500 TABLET, EXTENDED RELEASE ORAL
Qty: 180 TABLET | Refills: 3
Start: 2022-05-31 | End: 2022-09-20

## 2022-05-31 ASSESSMENT — PATIENT HEALTH QUESTIONNAIRE - PHQ9
SUM OF ALL RESPONSES TO PHQ9 QUESTIONS 1 & 2: 0
SUM OF ALL RESPONSES TO PHQ QUESTIONS 1-9: 0
SUM OF ALL RESPONSES TO PHQ QUESTIONS 1-9: 0
1. LITTLE INTEREST OR PLEASURE IN DOING THINGS: 0
2. FEELING DOWN, DEPRESSED OR HOPELESS: 0
SUM OF ALL RESPONSES TO PHQ QUESTIONS 1-9: 0
SUM OF ALL RESPONSES TO PHQ QUESTIONS 1-9: 0

## 2022-05-31 ASSESSMENT — ENCOUNTER SYMPTOMS
SHORTNESS OF BREATH: 0
TROUBLE SWALLOWING: 0
ABDOMINAL PAIN: 0

## 2022-05-31 NOTE — PROGRESS NOTES
CC   Chief Complaint   Patient presents with    Diabetes     , 2 hour fast     HPI:   Patient comes in today for the below issue(s). Left flank pain/thoracic back pain  Follow up  Ongoing but intermittent pain- can go weeks without pain   Worse with activity, improves with rest and heating pad  He is comofrtable with observation. Chiropractor has helped soemwhat as well. Diabetes:  Type 2  Chronic issue, present for years  Patient feels well. Issue is better controlled. Patient does not have complaints or concerns today. Medications reviewed. Currently on Glucophage XR, Actos. We have tried other meds that he has stopped due to financial considerations. Taking all medications and tolerating well. Glucose screens being checked  Yes- no log though. hypoglycemic episodes no  Patient is reporting intermittent numbness and tingling in his extremities but otherwise denies no other symptoms such as vision changes polyuria polydipsia or polyphagia  Most recent labs reviewed with patient. Lab Results   Component Value Date    LABA1C 6.3 05/31/2022     No results found for: EAG    Afib, hyperlipidemia, hypertension  Follow up  Doing well. Saw cardiology- note reviewed. Is noting resting HR is down into the low 50's. Not dizzy or lightheaded or excessively fatigued. Denies chest pain or palpitation  Blood pressure is low today, and he is noting a degree of positional lightheadedness. He is not checking his blood pressures at home  Reports compliance with meds including DOAC        Review of Systems  Review of Systems   Constitutional: Negative for chills, fatigue and fever. HENT: Negative for trouble swallowing. Respiratory: Negative for shortness of breath. Cardiovascular: Negative for chest pain and palpitations. Gastrointestinal: Negative for abdominal pain. Genitourinary: Negative for difficulty urinating. Musculoskeletal: Positive for arthralgias and back pain. Neurological: Positive for light-headedness. Negative for dizziness, syncope and headaches. Past Medical History:   Diagnosis Date    Allergic rhinitis     At[address] ([facility]     follows with EP    Cholelithiasis     noted on 3/11 CT    COPD (chronic obstructive pulmonary disease) ([facility]     early stages    Diabetes type 2, controlled ([facility]     Diverticulosis     noted on 3/11 CT    GERD (gastroesophageal reflux disease)     Hyperlipidemia     Hypertension          PE:  VS:  BP (!) 125/55   Pulse 83   Temp 98.2 °F (36.8 °C) (Temporal)   Resp 18   Ht 5' 9\" (1.753 m)   Wt 197 lb (89.4 kg)   SpO2 97%   BMI 29.09 kg/m²   Physical ExamGeneral: Well nourished, well developed, no acute distress  Eyes:  PERRL   Conjunctiva unremarkable   Neck:  Supple   No palpable cervical lymphoadenopathy   Thyroid without mass or enlargement   No carotid bruits  Resp: Lungs CTAB   Equal and adequate air exchange without accessory muscle use   No rales, rhonchi or wheeze  CV: S1S2 regular rate and rhythm, no ectopy   No murmur   Intact distal pulses   No edema  GI: Abdomen Soft, non tender, non distended   Normoactive bowel sounds   No CVA tenderness  MS: Physiologic ROM of all extremities    Thoracic and lumbar spine normal on inspection and palpation. Intact distal pulses   No clubbing or cyanosis   Skin Warm and dry; no rash or lesion  Neuro: Alert and oriented; normal and intact dtr's   Psych: Euthymic mood, congruent affect     Data:  A1c reviewed  Assessment (including specific orders/meds/labs):      Jon Blackman was seen today for diabetes. Diagnoses and all orders for this visit:    Controlled type 2 diabetes mellitus with hyperglycemia, unspecified whether long term insulin use (HCC)  -     POCT glycosylated hemoglobin (Hb A1C)  -     POCT Glucose  -     metFORMIN (GLUCOPHAGE-XR) 500 mg extended release tablet;  Take 1 tablet by mouth daily (with breakfast)  -     Microalbumin / Creatinine Urine Ratio; Future  -     Hemoglobin A1C; Future  -     Comprehensive Metabolic Panel; Future  -     Lipid Panel; Future    Typical atrial flutter (HCC)    Essential hypertension    Left flank pain        Plan:     Diabetes doing much better. I congratulated  him on his efforts. Will drop metformin to 500 mg daily with intent to stop in 3-4 weeks if things remain well controlled. Expect a modest increase in A1C at follow up. Cardiac issues are as per cardiology. Blood pressure is at goal.  Some lightheadedness, but he is comfortable with continued observation. Defer discussion anticoagulation to them. Flank pain/thoracic back pain is ongoing but stable. Continue current treatment plan    Counseled regarding above diagnosis, including possible risks and complications,  especially if left uncontrolled. Counseled regarding the possible sideeffects, risks, benefits and alternatives to treatment; patient and/or guardian verbalizes understanding, agrees, feels comfortable with and wishes to proceed with above treatment plan. Call or go to ED immediately if symptoms worsen or persist. Advised patient to call with any new medication issues, and, as applicable, read all Rx info from pharmacy to assure aware ofall possible risks and side effects of medication before taking. As applicable, educational materials and/or home exercises printed forpatient's review and were included in patient instructions on his/her After Visit Summary and given to patient at the end of visit. Patient and/or guardian given opportunity to ask questions/raise concerns. He verbalized comfort and understanding of instructions. Follow Up:     Return in about 4 months (around 9/30/2022), or if symptoms worsen or fail to improve, for AWV with me (30 min). or sooner if the above issues change unexpectedly or new issues develop     This document was prepared at least partially through the use ofNational Transcript Centerice recognition software. Although effort is taken to assure the accuracy of this document, it is possible that grammatical, syntax,  or spelling errors may occur.       Electronically signed by Feliciano Grimm MD [facility]

## 2022-06-01 ASSESSMENT — ENCOUNTER SYMPTOMS: BACK PAIN: 1

## 2022-06-11 DIAGNOSIS — E11.65 UNCONTROLLED TYPE 2 DIABETES MELLITUS WITH HYPERGLYCEMIA (HCC): ICD-10-CM

## 2022-06-13 RX ORDER — PIOGLITAZONEHYDROCHLORIDE 30 MG/1
TABLET ORAL
Qty: 90 TABLET | Refills: 0 | Status: SHIPPED
Start: 2022-06-13 | End: 2022-09-20 | Stop reason: SDUPTHER

## 2022-06-21 ENCOUNTER — TELEPHONE (OUTPATIENT)
Dept: NON INVASIVE DIAGNOSTICS | Age: 72
End: 2022-06-21

## 2022-06-21 NOTE — TELEPHONE ENCOUNTER
Called the patient to schedule appt he said that he is following with general cardiology right now and that he will call us back if he needs us in the future.

## 2022-06-27 DIAGNOSIS — Z79.899 MEDICATION MANAGEMENT: ICD-10-CM

## 2022-06-27 RX ORDER — ATORVASTATIN CALCIUM 80 MG/1
80 TABLET, FILM COATED ORAL DAILY
Qty: 90 TABLET | Refills: 1 | Status: SHIPPED | OUTPATIENT
Start: 2022-06-27

## 2022-06-27 RX ORDER — ATORVASTATIN CALCIUM 80 MG/1
TABLET, FILM COATED ORAL
Qty: 90 TABLET | Refills: 0 | OUTPATIENT
Start: 2022-06-27

## 2022-07-09 DIAGNOSIS — G47.00 INSOMNIA, UNSPECIFIED TYPE: ICD-10-CM

## 2022-07-11 RX ORDER — TRAZODONE HYDROCHLORIDE 100 MG/1
150 TABLET ORAL NIGHTLY PRN
Qty: 90 TABLET | Refills: 1 | Status: SHIPPED
Start: 2022-07-11 | End: 2022-09-20

## 2022-08-15 RX ORDER — LISINOPRIL AND HYDROCHLOROTHIAZIDE 20; 12.5 MG/1; MG/1
1 TABLET ORAL DAILY
Qty: 90 TABLET | Refills: 1 | Status: SHIPPED | OUTPATIENT
Start: 2022-08-15

## 2022-09-14 DIAGNOSIS — E11.65 CONTROLLED TYPE 2 DIABETES MELLITUS WITH HYPERGLYCEMIA, UNSPECIFIED WHETHER LONG TERM INSULIN USE (HCC): ICD-10-CM

## 2022-09-14 LAB
ALBUMIN SERPL-MCNC: 4.4 G/DL (ref 3.5–5.2)
ALP BLD-CCNC: 65 U/L (ref 40–129)
ALT SERPL-CCNC: 14 U/L (ref 0–40)
ANION GAP SERPL CALCULATED.3IONS-SCNC: 16 MMOL/L (ref 7–16)
AST SERPL-CCNC: 20 U/L (ref 0–39)
BILIRUB SERPL-MCNC: 0.4 MG/DL (ref 0–1.2)
BUN BLDV-MCNC: 11 MG/DL (ref 6–23)
CALCIUM SERPL-MCNC: 9.9 MG/DL (ref 8.6–10.2)
CHLORIDE BLD-SCNC: 101 MMOL/L (ref 98–107)
CHOLESTEROL, TOTAL: 162 MG/DL (ref 0–199)
CO2: 24 MMOL/L (ref 22–29)
CREAT SERPL-MCNC: 1.2 MG/DL (ref 0.7–1.2)
CREATININE URINE: 109 MG/DL (ref 40–278)
GFR AFRICAN AMERICAN: >60
GFR NON-AFRICAN AMERICAN: 60 ML/MIN/1.73
GLUCOSE BLD-MCNC: 135 MG/DL (ref 74–99)
HBA1C MFR BLD: 6.7 % (ref 4–5.6)
HDLC SERPL-MCNC: 41 MG/DL
LDL CHOLESTEROL CALCULATED: 94 MG/DL (ref 0–99)
MICROALBUMIN UR-MCNC: <12 MG/L
MICROALBUMIN/CREAT UR-RTO: ABNORMAL (ref 0–30)
POTASSIUM SERPL-SCNC: 4.6 MMOL/L (ref 3.5–5)
SODIUM BLD-SCNC: 141 MMOL/L (ref 132–146)
TOTAL PROTEIN: 7.7 G/DL (ref 6.4–8.3)
TRIGL SERPL-MCNC: 137 MG/DL (ref 0–149)
VLDLC SERPL CALC-MCNC: 27 MG/DL

## 2022-09-18 DIAGNOSIS — E11.65 UNCONTROLLED TYPE 2 DIABETES MELLITUS WITH HYPERGLYCEMIA (HCC): ICD-10-CM

## 2022-09-18 RX ORDER — PIOGLITAZONEHYDROCHLORIDE 30 MG/1
TABLET ORAL
Qty: 90 TABLET | Refills: 0 | Status: CANCELLED | OUTPATIENT
Start: 2022-09-18

## 2022-09-20 ENCOUNTER — OFFICE VISIT (OUTPATIENT)
Dept: FAMILY MEDICINE CLINIC | Age: 72
End: 2022-09-20
Payer: MEDICARE

## 2022-09-20 VITALS
OXYGEN SATURATION: 98 % | HEIGHT: 69 IN | RESPIRATION RATE: 16 BRPM | DIASTOLIC BLOOD PRESSURE: 57 MMHG | SYSTOLIC BLOOD PRESSURE: 122 MMHG | WEIGHT: 196 LBS | HEART RATE: 54 BPM | TEMPERATURE: 97.6 F | BODY MASS INDEX: 29.03 KG/M2

## 2022-09-20 DIAGNOSIS — Z12.5 SCREENING FOR MALIGNANT NEOPLASM OF PROSTATE: ICD-10-CM

## 2022-09-20 DIAGNOSIS — Z12.11 SCREEN FOR COLON CANCER: ICD-10-CM

## 2022-09-20 DIAGNOSIS — Z00.00 MEDICARE ANNUAL WELLNESS VISIT, SUBSEQUENT: Primary | ICD-10-CM

## 2022-09-20 DIAGNOSIS — I48.3 TYPICAL ATRIAL FLUTTER (HCC): ICD-10-CM

## 2022-09-20 DIAGNOSIS — E78.2 MIXED HYPERLIPIDEMIA: ICD-10-CM

## 2022-09-20 DIAGNOSIS — E11.65 CONTROLLED TYPE 2 DIABETES MELLITUS WITH HYPERGLYCEMIA, UNSPECIFIED WHETHER LONG TERM INSULIN USE (HCC): ICD-10-CM

## 2022-09-20 DIAGNOSIS — G47.00 INSOMNIA, UNSPECIFIED TYPE: ICD-10-CM

## 2022-09-20 DIAGNOSIS — K21.00 GASTROESOPHAGEAL REFLUX DISEASE WITH ESOPHAGITIS WITHOUT HEMORRHAGE: ICD-10-CM

## 2022-09-20 DIAGNOSIS — I10 ESSENTIAL HYPERTENSION: ICD-10-CM

## 2022-09-20 PROCEDURE — 3044F HG A1C LEVEL LT 7.0%: CPT | Performed by: FAMILY MEDICINE

## 2022-09-20 PROCEDURE — 1123F ACP DISCUSS/DSCN MKR DOCD: CPT | Performed by: FAMILY MEDICINE

## 2022-09-20 PROCEDURE — G0439 PPPS, SUBSEQ VISIT: HCPCS | Performed by: FAMILY MEDICINE

## 2022-09-20 RX ORDER — TRAZODONE HYDROCHLORIDE 100 MG/1
200 TABLET ORAL NIGHTLY PRN
Qty: 180 TABLET | Refills: 1 | Status: SHIPPED | OUTPATIENT
Start: 2022-09-20

## 2022-09-20 RX ORDER — PIOGLITAZONEHYDROCHLORIDE 30 MG/1
30 TABLET ORAL DAILY
Qty: 90 TABLET | Refills: 3 | Status: SHIPPED | OUTPATIENT
Start: 2022-09-20

## 2022-09-20 SDOH — ECONOMIC STABILITY: FOOD INSECURITY: WITHIN THE PAST 12 MONTHS, YOU WORRIED THAT YOUR FOOD WOULD RUN OUT BEFORE YOU GOT MONEY TO BUY MORE.: NEVER TRUE

## 2022-09-20 SDOH — ECONOMIC STABILITY: FOOD INSECURITY: WITHIN THE PAST 12 MONTHS, THE FOOD YOU BOUGHT JUST DIDN'T LAST AND YOU DIDN'T HAVE MONEY TO GET MORE.: NEVER TRUE

## 2022-09-20 ASSESSMENT — LIFESTYLE VARIABLES
HAVE YOU OR SOMEONE ELSE BEEN INJURED AS A RESULT OF YOUR DRINKING: 0
HOW OFTEN DURING THE LAST YEAR HAVE YOU FAILED TO DO WHAT WAS NORMALLY EXPECTED FROM YOU BECAUSE OF DRINKING: 0
HAS A RELATIVE, FRIEND, DOCTOR, OR ANOTHER HEALTH PROFESSIONAL EXPRESSED CONCERN ABOUT YOUR DRINKING OR SUGGESTED YOU CUT DOWN: 0
HOW OFTEN DURING THE LAST YEAR HAVE YOU NEEDED AN ALCOHOLIC DRINK FIRST THING IN THE MORNING TO GET YOURSELF GOING AFTER A NIGHT OF HEAVY DRINKING: 0
HOW OFTEN DURING THE LAST YEAR HAVE YOU HAD A FEELING OF GUILT OR REMORSE AFTER DRINKING: 0
HOW MANY STANDARD DRINKS CONTAINING ALCOHOL DO YOU HAVE ON A TYPICAL DAY: 3 OR 4
HOW OFTEN DURING THE LAST YEAR HAVE YOU FOUND THAT YOU WERE NOT ABLE TO STOP DRINKING ONCE YOU HAD STARTED: 0
HOW OFTEN DURING THE LAST YEAR HAVE YOU BEEN UNABLE TO REMEMBER WHAT HAPPENED THE NIGHT BEFORE BECAUSE YOU HAD BEEN DRINKING: 0
HOW OFTEN DO YOU HAVE A DRINK CONTAINING ALCOHOL: 2-3 TIMES A WEEK

## 2022-09-20 ASSESSMENT — PATIENT HEALTH QUESTIONNAIRE - PHQ9
SUM OF ALL RESPONSES TO PHQ QUESTIONS 1-9: 0
SUM OF ALL RESPONSES TO PHQ QUESTIONS 1-9: 0
1. LITTLE INTEREST OR PLEASURE IN DOING THINGS: 0
SUM OF ALL RESPONSES TO PHQ QUESTIONS 1-9: 0
2. FEELING DOWN, DEPRESSED OR HOPELESS: 0
SUM OF ALL RESPONSES TO PHQ QUESTIONS 1-9: 0
SUM OF ALL RESPONSES TO PHQ9 QUESTIONS 1 & 2: 0

## 2022-09-20 ASSESSMENT — SOCIAL DETERMINANTS OF HEALTH (SDOH): HOW HARD IS IT FOR YOU TO PAY FOR THE VERY BASICS LIKE FOOD, HOUSING, MEDICAL CARE, AND HEATING?: NOT HARD AT ALL

## 2022-09-20 NOTE — PROGRESS NOTES
Medicare Annual Wellness Visit    Marcos Lockhart is here for Medicare AWV    Assessment & Plan   Medicare annual wellness visit, subsequent  Controlled type 2 diabetes mellitus with hyperglycemia, unspecified whether long term insulin use ([facility]  -      DIABETES FOOT EXAM  -     Comprehensive Metabolic Panel; Future  -     Lipid Panel; Future  -     Hemoglobin A1C; Future  -     CBC with Auto Differential; Future  Essential hypertension  -     Comprehensive Metabolic Panel; Future  -     Lipid Panel; Future  Mixed hyperlipidemia  -     Comprehensive Metabolic Panel; Future  -     Lipid Panel; Future  Typical atrial flutter (HCC)  Gastroesophageal reflux disease with esophagitis without hemorrhage  Insomnia, unspecified type  -     traZODone (DESYREL) 100 MG tablet; Take 2 tablets by mouth nightly as needed for Sleep, Disp-180 tablet, R-1Normal  Screen for colon cancer  -     Daisy Fabian MD, General Surgery, [facility] [place]  Screening for malignant neoplasm of prostate  -     PSA Screening; Future        Overall doing well. Diabetes is controlled even off metformin. No changes being planned, continue current regimen. Blood pressure is reassuring. He is asymptomatic from the low diastolic. Monitor for now    Atrial fib flutter is as per cardiology. Lipids are reassuring. Continue statin at high dose    No new GERD symptoms reported    Insomnia is slowly returning. He is sleeping less than before. We will try a little higher dose of trazodone to see if he can get an adequate relief. Labs are ordered to be done before next visit. He is referred for colorectal cancer screening    Recommendations for Preventive Services Due: see orders and patient instructions/AVS.  Recommended screening schedule for the next 5-10 years is provided to the patient in written form: see Patient Instructions/AVS.     Return for Medicare Annual Wellness Visit in 1 year.      Subjective   The following acute and/or chronic problems were also addressed today:    A. fib/flutter, hypertension, hyperlipidemia. Issues are stable. Clinically feels well. Has no cardiac complaints. Diabetes type 2 is stable. He has no complaints. GI symptoms have improved off of metformin. A1c is still acceptable    Is reporting worsening insomnia symptoms. Only staying asleep for a few hours again. Does report some nocturia that will get him up sometimes but not always. He is having a hard time falling back asleep. Asking if trazodone can be increased. Patient's complete Health Risk Assessment and screening values have been reviewed and are found in Flowsheets. The following problems were reviewed today and where indicated follow up appointments were made and/or referrals ordered. Positive Risk Factor Screenings with Interventions:                  No Positive Risk Factors identified today. Objective   Vitals:    09/20/22 1049   BP: (!) 122/57   Pulse: 54   Resp: 16   Temp: 97.6 °F (36.4 °C)   TempSrc: Temporal   SpO2: 98%   Weight: 196 lb (88.9 kg)   Height: 5' 9\" (1.753 m)      Body mass index is 28.94 kg/m². Physical Exam  General: Well nourished, well developed, no acute distress  Eyes:  PERRL   Conjunctiva unremarkable   Neck:  Supple   No palpable cervical lymphoadenopathy   Thyroid without mass or enlargement   No carotid bruits  Resp: Lungs CTAB   Equal and adequate air exchange without accessory muscle use   No rales, rhonchi or wheeze  CV: S1S2 regular rate and rhythm, no ectopy   No murmur   Intact distal pulses   No edema  GI: Abdomen Soft, non tender, non distended   Normoactive bowel sounds  MS: Physiologic ROM of all extremities    Intact distal pulses   No clubbing or cyanosis   Skin Warm and dry; no rash or lesion  Neuro: Alert and oriented; normal and intact dtr's   Psych: Euthymic mood, congruent affect       No Known Allergies  Prior to Visit Medications    Medication Sig Taking?  Authorizing Provider lisinopril-hydroCHLOROthiazide (PRINZIDE;ZESTORETIC) 20-12.5 MG per tablet Take 1 tablet by mouth in the morning.  Yes Estefania Jose MD   apixaban (ELIQUIS) 5 MG TABS tablet Take 1 tablet by mouth 2 times daily for 1 day [id number]      Lot [id number]  Exp 7/24 Yes Arleen Hickman MD   atorvastatin (LIPITOR) 80 MG tablet Take 1 tablet by mouth daily Yes Estefania Jose MD   metoprolol succinate (TOPROL XL) 25 MG extended release tablet Take 1 tablet by mouth daily Yes Estefania Jose MD   ascorbic acid (VITAMIN C) 500 MG tablet Take 500 mg by mouth daily Yes Historical Provider, MD   potassium chloride (KLOR-CON M) 20 MEQ TBCR extended release tablet Take 1 tablet by mouth daily Yes Estefania Jose MD   magnesium oxide (MAG-OX) 400 (240 Mg) MG tablet Take 1 tablet by mouth 2 times daily Yes Estefania Jose MD   esomeprazole (NEXIUM) 40 MG delayed release capsule Take 40 mg by mouth daily Instructed to take morning of surgery with a sip of water Yes Historical Provider, MD   pioglitazone (ACTOS) 30 MG tablet Take 1 tablet by mouth daily Yes Estefania Jose MD   traZODone (DESYREL) 100 MG tablet Take 2 tablets by mouth nightly as needed for Sleep Yes Estefania Jose MD   metFORMIN (GLUCOPHAGE-XR) 500 mg extended release tablet Take 1 tablet by mouth daily (with breakfast)  Patient not taking: Reported on 9/20/2022  Estefania Jose MD   apixaban Georgana Space) 5 MG TABS tablet Take 1 tablet by mouth 2 times daily for 1 day Lot number: [id number]  EXP DATE: 2024  Arleen Hickman MD   apixaban (ELIQUIS) 5 MG TABS tablet Take 1 tablet by mouth 2 times daily  Estefania Jose MD       [facility] (Including outside providers/suppliers regularly involved in providing care):   Patient Care Team:  Estefania Jose MD as PCP - General (Family Medicine)  Estefania Jose MD as PCP - [facility] Empaneled Provider  Lyndsey Luna MD as Consulting Physician (Cardiology)  Kendra Najjar, DO as Consulting Physician (Electrophysiology)     Reviewed and updated this visit:  Tobacco  Allergies  Meds  Med Hx  Surg Hx  Soc Hx  Fam Hx

## 2022-09-30 RX ORDER — BLOOD SUGAR DIAGNOSTIC
STRIP MISCELLANEOUS
Refills: 0 | OUTPATIENT
Start: 2022-09-30

## 2022-11-07 DIAGNOSIS — G47.00 INSOMNIA, UNSPECIFIED TYPE: ICD-10-CM

## 2022-11-07 RX ORDER — TRAZODONE HYDROCHLORIDE 100 MG/1
TABLET ORAL
Qty: 135 TABLET | Refills: 1 | Status: SHIPPED | OUTPATIENT
Start: 2022-11-07

## 2022-11-11 ENCOUNTER — OFFICE VISIT (OUTPATIENT)
Dept: SURGERY | Age: 72
End: 2022-11-11
Payer: MEDICARE

## 2022-11-11 VITALS
SYSTOLIC BLOOD PRESSURE: 103 MMHG | HEART RATE: 51 BPM | HEIGHT: 69 IN | WEIGHT: 202 LBS | RESPIRATION RATE: 16 BRPM | BODY MASS INDEX: 29.92 KG/M2 | DIASTOLIC BLOOD PRESSURE: 50 MMHG

## 2022-11-11 DIAGNOSIS — Z86.010 HISTORY OF COLON POLYPS: Primary | ICD-10-CM

## 2022-11-11 PROCEDURE — 99203 OFFICE O/P NEW LOW 30 MIN: CPT | Performed by: SURGERY

## 2022-11-11 PROCEDURE — 3074F SYST BP LT 130 MM HG: CPT | Performed by: SURGERY

## 2022-11-11 PROCEDURE — 1123F ACP DISCUSS/DSCN MKR DOCD: CPT | Performed by: SURGERY

## 2022-11-11 PROCEDURE — 3078F DIAST BP <80 MM HG: CPT | Performed by: SURGERY

## 2022-11-11 NOTE — PROGRESS NOTES
[address] [facility] Surgery Clinic Note    Assessment/Plan:      Diagnosis Orders   1. History of colon polyps      We will plan for colonoscopy [place] ([facility]) price is up 17% anytime I see if any            Return for Colonoscopy. Chief Complaint   Patient presents with    New Patient    Colonoscopy     consult       PCP: Sheryl Neal MD    HPI: Sandrita Hoyos is a 71 y.o. male who presents in consultation for history of colon polyps. His last colonoscopy was 6 years ago with Dr. Jacqueline Ventura. He says polyps were removed. He denies any issues. He does have chronic loose stools that he says are from medications. He has no melena or hematochezia. There is no abdominal pain or unintentional weight loss. There are no bowel caliber changes. There is no family history of colon cancer or inflammatory bowel disease. Past Medical History:   Diagnosis Date    Allergic rhinitis     Atrial fibrillation ([facility] [address]     follows with EP    Cholelithiasis     noted on 3/11 CT    COPD (chronic obstructive pulmonary disease) ([facility] [address]     early stages    Diabetes type 2, controlled ([facility] [address]     Diverticulosis     noted on 3/11 CT    GERD (gastroesophageal reflux disease)     Hyperlipidemia     Hypertension        Past Surgical History:   Procedure Laterality Date    ABLATION OF DYSRHYTHMIC FOCUS  08/01/2018    Afib/Aflutter ablation    APPENDECTOMY      COLONOSCOPY         Prior to Admission medications    Medication Sig Start Date End Date Taking? Authorizing Provider   traZODone (DESYREL) 100 MG tablet TAKE 1 & 1/2 TABLETS BY MOUTH NIGHTLY AS NEEDED FOR SLEEP 11/7/22  Yes Sheryl Neal MD   pioglitazone (ACTOS) 30 MG tablet Take 1 tablet by mouth daily 9/20/22  Yes Sheryl Neal MD   lisinopril-hydroCHLOROthiazide (PRINZIDE;ZESTORETIC) 20-12.5 MG per tablet Take 1 tablet by mouth in the morning.  8/15/22  Yes Sheryl Neal MD   apixaban (ELIQUIS) 5 MG TABS tablet Take 1 tablet by mouth 2 times daily for 1 day [id number]      Lot [id number]  Exp 22 Yes Siddhartha Velez MD   atorvastatin (LIPITOR) 80 MG tablet Take 1 tablet by mouth daily 22  Yes Yolande Flannery MD   metoprolol succinate (TOPROL XL) 25 MG extended release tablet Take 1 tablet by mouth daily 3/15/22  Yes Yolande Flannery MD   ascorbic acid (VITAMIN C) 500 MG tablet Take 500 mg by mouth daily   Yes Historical Provider, MD   potassium chloride (KLOR-CON M) 20 MEQ TBCR extended release tablet Take 1 tablet by mouth daily 10/14/21  Yes Yolande Flannery MD   magnesium oxide (MAG-OX) 400 (240 Mg) MG tablet Take 1 tablet by mouth 2 times daily 18  Yes Yolande Flannery MD   esomeprazole ([address]) 40 MG delayed release capsule Take 40 mg by mouth daily Instructed to take morning of surgery with a sip of water   Yes Historical Provider, MD       No Known Allergies    Social History     Socioeconomic History    Marital status:      Spouse name: None    Number of children: None    Years of education: None    Highest education level: None   Tobacco Use    Smoking status: Former     Packs/day: 1.50     Years: 15.00     Pack years: 22.50     Types: Cigarettes     Quit date: 1997     Years since quittin.6    Smokeless tobacco: Never   Vaping Use    Vaping Use: Never used   Substance and Sexual Activity    Alcohol use: Yes     Comment: moderate alcohol use / drinks 12 beers or shots liquor per week    Drug use: No     Social Determinants of Health     Financial Resource Strain: Low Risk     Difficulty of Paying Living Expenses: Not hard at all   Food Insecurity: No Food Insecurity    Worried About Running Out of Food in the Last Year: Never true    Ran Out of Food in the Last Year: Never true   Physical Activity: Sufficiently Active    Days of Exercise per Week: 5 days    Minutes of Exercise per Session: 40 min       Family History   Problem Relation Age of Onset    Dementia Mother     Heart Disease Father     Other Brother         elevated PSA Cancer Brother         T cell lymphoma        Review of Systems   All other systems reviewed and are negative. Objective:  Vitals:    11/11/22 0857   BP: (!) 103/50   Pulse: 51   Resp: 16   Weight: 202 lb (91.6 kg)   Height: 5' 9\" (1.753 m)          Physical Exam  Constitutional:       General: He is not in acute distress. Appearance: He is not diaphoretic. HENT:      Head: Normocephalic and atraumatic. Eyes:      General:         Right eye: No discharge. Left eye: No discharge. Neck:      Trachea: No tracheal deviation. Cardiovascular:      Rate and Rhythm: Normal rate. Pulmonary:      Effort: Pulmonary effort is normal. No respiratory distress. Abdominal:      General: There is no distension. Palpations: Abdomen is soft. Tenderness: There is no abdominal tenderness. There is no guarding or rebound. Skin:     General: Skin is warm and dry. Neurological:      Mental Status: He is alert and oriented to person, place, and time. Tyler Alvares MD    I have examined the patient and performed the key aspects of physical exam, reviewed the record (including all pertinent and new radiology images and laboratory findings), and discussed the case with the primary and referring provider where applicable. NOTE: This report, in part or full,may have been transcribed using voice recognition software. Every effort was made to ensure accuracy; however, inadvertent computerized transcription errors may be present. Please excuse any transcriptional grammatical or spelling errors that may have escaped my editorial review.     CC: Alanna Judge MD

## 2022-11-14 ENCOUNTER — PREP FOR PROCEDURE (OUTPATIENT)
Dept: SURGERY | Age: 72
End: 2022-11-14

## 2022-11-14 PROBLEM — Z12.11 SCREEN FOR COLON CANCER: Status: ACTIVE | Noted: 2022-11-14

## 2022-11-28 RX ORDER — APIXABAN 5 MG/1
TABLET, FILM COATED ORAL
Qty: 180 TABLET | Refills: 3 | Status: SHIPPED | OUTPATIENT
Start: 2022-11-28

## 2022-12-14 PROBLEM — Z12.11 SCREEN FOR COLON CANCER: Status: RESOLVED | Noted: 2022-11-14 | Resolved: 2022-12-14

## 2022-12-24 DIAGNOSIS — Z79.899 MEDICATION MANAGEMENT: ICD-10-CM

## 2022-12-27 RX ORDER — ATORVASTATIN CALCIUM 80 MG/1
TABLET, FILM COATED ORAL
Qty: 90 TABLET | Refills: 3 | Status: SHIPPED | OUTPATIENT
Start: 2022-12-27

## 2023-01-10 ENCOUNTER — TELEPHONE (OUTPATIENT)
Dept: SURGERY | Age: 73
End: 2023-01-10

## 2023-01-10 NOTE — TELEPHONE ENCOUNTER
Prior Authorization Form:      DEMOGRAPHICS:                     Patient Name:  Yessi Valdivia  Patient :  1950            Insurance:  Payor: Chris Yap / Plan: Susana Winchester ESSENTIAL/PLUS / Product Type: *No Product type* /   Insurance ID Number:    Payer/Plan Subscr  Sex Relation Sub. Ins. ID Effective Group Num   1.  BCBS MEDICAREVonzella Donate 1950 Male Self [id number] [id number]                                    [id number]         DIAGNOSIS & PROCEDURE:                       Procedure/Operation: colonoscopy           CPT Code: 15247    Diagnosis:  screening    ICD10 Code: z12.11    Location:  [facility]    Surgeon:  Kasey Tiwari INFORMATION:                          Date: 3/16/23    Time: 900am              Anesthesia:  MAC/TIVA                                                       Status:  Outpatient        Special Comments:         Electronically signed by Marilu Silva MA on 1/10/2023 at 8:47 AM

## 2023-02-16 RX ORDER — LISINOPRIL AND HYDROCHLOROTHIAZIDE 20; 12.5 MG/1; MG/1
TABLET ORAL
Qty: 90 TABLET | Refills: 3 | Status: SHIPPED | OUTPATIENT
Start: 2023-02-16

## 2023-02-20 ENCOUNTER — OFFICE VISIT (OUTPATIENT)
Dept: CARDIOLOGY CLINIC | Age: 73
End: 2023-02-20
Payer: MEDICARE

## 2023-02-20 VITALS
BODY MASS INDEX: 30.33 KG/M2 | DIASTOLIC BLOOD PRESSURE: 70 MMHG | WEIGHT: 204.8 LBS | SYSTOLIC BLOOD PRESSURE: 128 MMHG | HEIGHT: 69 IN | HEART RATE: 58 BPM

## 2023-02-20 DIAGNOSIS — Z79.01 ON APIXABAN THERAPY: ICD-10-CM

## 2023-02-20 DIAGNOSIS — I48.91 ATRIAL FIBRILLATION, UNSPECIFIED TYPE (HCC): Primary | ICD-10-CM

## 2023-02-20 DIAGNOSIS — I48.3 TYPICAL ATRIAL FLUTTER (HCC): ICD-10-CM

## 2023-02-20 DIAGNOSIS — R07.9 CHEST PAIN, UNSPECIFIED TYPE: ICD-10-CM

## 2023-02-20 PROCEDURE — 99214 OFFICE O/P EST MOD 30 MIN: CPT | Performed by: INTERNAL MEDICINE

## 2023-02-20 PROCEDURE — 3074F SYST BP LT 130 MM HG: CPT | Performed by: INTERNAL MEDICINE

## 2023-02-20 PROCEDURE — 3078F DIAST BP <80 MM HG: CPT | Performed by: INTERNAL MEDICINE

## 2023-02-20 PROCEDURE — 1123F ACP DISCUSS/DSCN MKR DOCD: CPT | Performed by: INTERNAL MEDICINE

## 2023-02-20 PROCEDURE — 93000 ELECTROCARDIOGRAM COMPLETE: CPT | Performed by: INTERNAL MEDICINE

## 2023-02-20 NOTE — PROGRESS NOTES
OUTPATIENT CARDIOLOGY FOLLOW-UP    Name: Silas Gamez    Age: 68 y.o. Primary Care Physician: Wilbert Greer MD    Date of Service: 2/20/2023    Chief Complaint:   Chief Complaint   Patient presents with    Atrial Fibrillation        Interim History:   Here for follow-up regarding atrial fibrillation, established with me 3/2022. Previously followed with EP, underwent extensive ablation of atrial fibrillation and also atrial flutter which occurred intraprocedurally in 2018 with Dr. Aleta Ba. Has had 2 cardioversions subsequently and has been maintaining sinus rhythm since 2019. Overall doing okay. States 6 weeks ago had episodes of very brief pinching left-sided chest pain lasting a couple seconds. Also states he felt \"off\" around the same time. He stays active, exercising, denies any recent shortness of breath or chest pain. Feeling better recently. Denies palpitations or episodes of atrial fibrillation that he is aware of.     Review of Systems:   Negatives have been described above    Past Medical History:  Past Medical History:   Diagnosis Date    Allergic rhinitis     Atrial fibrillation ([address]     follows with EP    Cholelithiasis     noted on 3/11 CT    COPD (chronic obstructive pulmonary disease) ([address]     early stages    Diabetes type 2, controlled ([address]     Diverticulosis     noted on 3/11 CT    GERD (gastroesophageal reflux disease)     Hyperlipidemia     Hypertension        Past Surgical History:  Past Surgical History:   Procedure Laterality Date    ABLATION OF DYSRHYTHMIC FOCUS  08/01/2018    Afib/Aflutter ablation    APPENDECTOMY      COLONOSCOPY         Family History:  Family History   Problem Relation Age of Onset    Dementia Mother     Heart Disease Father     Other Brother         elevated PSA    Cancer Brother         T cell lymphoma        Social History:  Social History     Tobacco Use    Smoking status: Former     Packs/day: 1.50     Years: 15.00     Pack years: 22.50 Types: Cigarettes     Quit date: 1997     Years since quittin.8    Smokeless tobacco: Never   Vaping Use    Vaping Use: Never used   Substance Use Topics    Alcohol use: Yes     Comment: moderate alcohol use / drinks 12 beers or shots liquor per week    Drug use: No        Allergies:  No Known Allergies    Current Medications:    Current Outpatient Medications:     lisinopril-hydroCHLOROthiazide (PRINZIDE;ZESTORETIC) 20-12.5 MG per tablet, TAKE ONE TABLET BY MOUTH IN THE MORNING, Disp: 90 tablet, Rfl: 3    atorvastatin (LIPITOR) 80 MG tablet, TAKE ONE TABLET BY MOUTH DAILY, Disp: 90 tablet, Rfl: 3    ELIQUIS 5 MG TABS tablet, TAKE ONE TABLET BY MOUTH TWO TIMES A DAY, Disp: 180 tablet, Rfl: 3    traZODone (DESYREL) 100 MG tablet, TAKE 1 & 1/2 TABLETS BY MOUTH NIGHTLY AS NEEDED FOR SLEEP, Disp: 135 tablet, Rfl: 1    pioglitazone (ACTOS) 30 MG tablet, Take 1 tablet by mouth daily, Disp: 90 tablet, Rfl: 3    metoprolol succinate (TOPROL XL) 25 MG extended release tablet, Take 1 tablet by mouth daily, Disp: 90 tablet, Rfl: 3    ascorbic acid (VITAMIN C) 500 MG tablet, Take 500 mg by mouth daily, Disp: , Rfl:     potassium chloride (KLOR-CON M) 20 MEQ TBCR extended release tablet, Take 1 tablet by mouth daily, Disp: 90 tablet, Rfl: 1    magnesium oxide (MAG-OX) 400 (240 Mg) MG tablet, Take 1 tablet by mouth 2 times daily, Disp: 60 tablet, Rfl: 5    esomeprazole (NEXIUM) 40 MG delayed release capsule, Take 40 mg by mouth daily Instructed to take morning of surgery with a sip of water, Disp: , Rfl:     Physical Exam:  /70   Pulse 58   Ht 5' 9\" (1.753 m)   Wt 204 lb 12.8 oz (92.9 kg)   BMI 30.24 kg/m²   Wt Readings from Last 3 Encounters:   23 204 lb 12.8 oz (92.9 kg)   22 202 lb (91.6 kg)   22 196 lb (88.9 kg)     Appearance: Awake, alert and oriented x 3, no acute respiratory distress  Skin: Intact, no rash  Head: Normocephalic, atraumatic  Eyes: EOMI, no conjunctival erythema  ENMT: No pharyngeal erythema, MMM, no rhinorrhea  Neck: Supple, no elevated JVP, no carotid bruits  Lungs: Clear to auscultation bilaterally. No wheezes, rales, or rhonchi.   Cardiac: Regular rate and rhythm, +S1S2, no murmurs apparent  Abdomen: Soft, nontender, +bowel sounds  Extremities: Moves all extremities x 4, no lower extremity edema  Neurologic: No focal motor deficits apparent, normal mood and affect, alert and oriented x 3  Peripheral Pulses: Intact posterior tibial pulses bilaterally    Laboratory Tests:  Lab Results   Component Value Date    CREATININE 1.2 09/14/2022    BUN 11 09/14/2022     09/14/2022    K 4.6 09/14/2022     09/14/2022    CO2 24 09/14/2022     Lab Results   Component Value Date/Time    MG 1.9 12/08/2020 12:00 PM     Lab Results   Component Value Date    WBC 5.8 02/16/2022    HGB 12.6 02/16/2022    HCT 39.4 02/16/2022    MCV 92.5 02/16/2022     02/16/2022     Lab Results   Component Value Date    ALT 14 09/14/2022    AST 20 09/14/2022    ALKPHOS 65 09/14/2022    BILITOT 0.4 09/14/2022     Lab Results   Component Value Date    CKTOTAL 134 03/24/2015     Lab Results   Component Value Date    INR 1.5 08/02/2018    INR 1.5 07/27/2018    PROTIME 16.7 (H) 08/02/2018    PROTIME 17.1 (H) 07/27/2018     Lab Results   Component Value Date    TSH 2.390 11/01/2019     Lab Results   Component Value Date    LABA1C 6.7 (H) 09/14/2022     No results found for: EAG  Lab Results   Component Value Date    CHOL 162 09/14/2022    CHOL 171 02/16/2022    CHOL 185 06/16/2021     Lab Results   Component Value Date    TRIG 137 09/14/2022    TRIG 128 02/16/2022    TRIG 251 (H) 06/16/2021     Lab Results   Component Value Date    HDL 41 09/14/2022    HDL 42 02/16/2022    HDL 35 06/16/2021     Lab Results   Component Value Date    LDLCALC 94 09/14/2022    LDLCALC 103 (H) 02/16/2022    LDLCALC 100 (H) 06/16/2021     Lab Results   Component Value Date    LABVLDL 27 09/14/2022    LABVLDL 26 02/16/2022 LABVLDL 50 2021     No results found for: CHOLHDLRATIO  No results for input(s): PROBNP in the last 72 hours. Cardiac Tests:  EC2023: Sinus bradycardia 58 beats minute. Normal axis and intervals. No ST or T wave changes. Echocardiogram:   Transthoracic echo 2019   Summary   Aortic valve opens well. Stage I diastolic dysfunction. Ejection fraction is visually estimated at 55-65%. Mild concentric left ventricular hypertrophy. The left atrium is mildly dilated. Atrial fibrillation   Structurally normal mitral valve. Physiologic and/or trace mitral regurgitation is present. The aortic valve appears mildly sclerotic. No pericardial effusion. Stress test:    Pharmacologic stress 2018    Gated SPECT left ventricular ejection fraction was calculated to be 82%, with normal myocardial thickening and wall motion. Impression:    ECG during the infusion did not change. The myocardial perfusion imaging was normal.    Overall left ventricular systolic function was normal without regional wall motion abnormalities. Low risk  pharmacologic stress test.     Cardiac catheterization:     Orders Placed This Encounter   Procedures    EKG 12 Lead        Requested Prescriptions      No prescriptions requested or ordered in this encounter        ASSESSMENT / PLAN:  H/o Persistent atrial fibrillation, intraprocedural atrial flutter. [id number] at least 4, AC apixaban. Extensive AF/AFL ablation with PVI and CTI and roofline 2018, subsequent ERAF  Multiple cardioversions, 2018, 10/2018 (after ablation), most recently 2019  Maintaining sinus  Atypical chest pain  Hypertension, well controlled  Type 2 diabetes, oral medications A1c 6.7%  Hyperlipidemia  CKD creatinine 1.2    Recommendations:  Stable from cardiac standpoint. Maintaining sinus rhythm.   Stays active, does not appear to be having anginal symptoms and low suspicion that his episodes of chest pain were due to obstructive CAD. Discussed ischemic evaluation versus observation, joint decision-making, will observe for now, if continued or more frequent chest pain, or more intense or longer duration low threshold to repeat ischemic evaluation  Continue metoprolol succinate to promote maintenance of sinus rhythm  Continue apixaban for stroke risk reduction  Continue exercise regimen  Aggressive risk factor modification  Follow-up in 6 months or sooner if need arises    The patient's current medication list, allergies, problem list and results of all previously ordered testing were reviewed at today's visit.     Marietta Dumas MD, [facility]

## 2023-03-05 DIAGNOSIS — Z79.899 MEDICATION MANAGEMENT: ICD-10-CM

## 2023-03-05 DIAGNOSIS — I48.0 PAROXYSMAL ATRIAL FIBRILLATION (HCC): ICD-10-CM

## 2023-03-06 RX ORDER — METOPROLOL SUCCINATE 25 MG/1
25 TABLET, EXTENDED RELEASE ORAL DAILY
Qty: 90 TABLET | Refills: 3 | Status: SHIPPED | OUTPATIENT
Start: 2023-03-06

## 2023-03-14 NOTE — PROGRESS NOTES
[facility] PRE-ADMISSION TESTING INSTRUCTIONS    The Preadmission Testing patient is instructed accordingly using the following criteria (check applicable):    ARRIVAL INSTRUCTIONS:  [x] Parking the day of Surgery is located in the Main Entrance lot. Upon entering the door, make an immediate right to the surgery reception desk    [x] Bring photo ID and insurance card    [x] Bring in a copy of Living will or Durable Power of  papers. [x] Please be sure to arrange for responsible adult to provide transportation to and from the hospital    [x] Please arrange for responsible adult to be with you for the 24 hour period post procedure due to having anesthesia    [x] If you awake am of surgery not feeling well or have temperature >100 please call [phone]    GENERAL INSTRUCTIONS:    [x] Nothing by mouth after midnight, including gum, candy, mints or water    [x] You may brush your teeth, but do not swallow any water    [x] Take medications as instructed with 1-2 oz of water    [x] Stop herbal supplements and vitamins 5 days prior to procedure    [x] Follow preop dosing of blood thinners per physician instructions    [] Take 1/2 dose of evening insulin, but no insulin after midnight    [x] No oral diabetic medications after midnight    [x] If diabetic and have low blood sugar or feel symptomatic, take 1-2oz apple juice only    [] Bring inhalers day of surgery    [] Bring C-PAP/ Bi-Pap day of surgery    [] Bring urine specimen day of surgery    [x] Shower or bath with soap, lather and rinse well, AM of Surgery, no lotion, powders or creams to surgical site    [x] Follow bowel prep as instructed per surgeon    [] No tobacco products within 24 hours of surgery     [x] No alcohol or illegal drug use within 24 hours of surgery.     [x] Jewelry, body piercing's, eyeglasses, contact lenses and dentures are not permitted into surgery (bring cases)      [] Please do not wear any nail polish, make up or hair products on the day of surgery    [x] You can expect a call the business day prior to procedure to notify you if your arrival time changes    [x] If you receive a survey after surgery we would greatly appreciate your comments    [] Parent/guardian of a minor must accompany their child and remain on the premises  the entire time they are under our care     [] Pediatric patients may bring favorite toy, blanket or comfort item with them    [] A caregiver or family member must remain with the patient during their stay if they are mentally handicapped, have dementia, disoriented or unable to use a call light or would be a safety concern if left unattended    [x] Please notify surgeon if you develop any illness between now and time of surgery (cold, cough, sore throat, fever, nausea, vomiting) or any signs of infections  including skin, wounds, and dental.    [x]  The Outpatient Pharmacy is available to fill your prescription here on your day of surgery, ask your preop nurse for details    [] Other instructions    EDUCATIONAL MATERIALS PROVIDED:    [] PAT Preoperative Education Packet/Booklet     [] Medication List    [] Transfusion bracelet applied with instructions    [] Shower with soap, lather and rinse well, and use CHG wipes provided the evening before surgery as instructed    [] Incentive spirometer with instructions

## 2023-03-16 ENCOUNTER — HOSPITAL ENCOUNTER (OUTPATIENT)
Age: 73
Setting detail: OUTPATIENT SURGERY
Discharge: HOME | End: 2023-03-16
Attending: SURGERY | Admitting: SURGERY
Payer: MEDICARE

## 2023-03-16 ENCOUNTER — ANESTHESIA (OUTPATIENT)
Dept: ENDOSCOPY | Age: 73
End: 2023-03-16
Payer: MEDICARE

## 2023-03-16 ENCOUNTER — ANESTHESIA EVENT (OUTPATIENT)
Dept: ENDOSCOPY | Age: 73
End: 2023-03-16
Payer: MEDICARE

## 2023-03-16 VITALS
HEART RATE: 50 BPM | OXYGEN SATURATION: 97 % | DIASTOLIC BLOOD PRESSURE: 67 MMHG | TEMPERATURE: 98.1 F | HEIGHT: 69 IN | SYSTOLIC BLOOD PRESSURE: 143 MMHG | RESPIRATION RATE: 16 BRPM | BODY MASS INDEX: 28.88 KG/M2 | WEIGHT: 195 LBS

## 2023-03-16 DIAGNOSIS — Z12.11 SCREEN FOR COLON CANCER: ICD-10-CM

## 2023-03-16 LAB — METER GLUCOSE: 141 MG/DL (ref 74–99)

## 2023-03-16 PROCEDURE — 7100000010 HC PHASE II RECOVERY - FIRST 15 MIN: Performed by: SURGERY

## 2023-03-16 PROCEDURE — 7100000011 HC PHASE II RECOVERY - ADDTL 15 MIN: Performed by: SURGERY

## 2023-03-16 PROCEDURE — 82962 GLUCOSE BLOOD TEST: CPT

## 2023-03-16 PROCEDURE — 45385 COLONOSCOPY W/LESION REMOVAL: CPT | Performed by: SURGERY

## 2023-03-16 PROCEDURE — 3700000000 HC ANESTHESIA ATTENDED CARE: Performed by: SURGERY

## 2023-03-16 PROCEDURE — 2709999900 HC NON-CHARGEABLE SUPPLY: Performed by: SURGERY

## 2023-03-16 PROCEDURE — 3700000001 HC ADD 15 MINUTES (ANESTHESIA): Performed by: SURGERY

## 2023-03-16 PROCEDURE — 2580000003 HC RX 258: Performed by: NURSE ANESTHETIST, CERTIFIED REGISTERED

## 2023-03-16 PROCEDURE — 45380 COLONOSCOPY AND BIOPSY: CPT | Performed by: SURGERY

## 2023-03-16 PROCEDURE — 88305 TISSUE EXAM BY PATHOLOGIST: CPT

## 2023-03-16 PROCEDURE — 3609010400 HC COLONOSCOPY POLYPECTOMY HOT BIOPSY: Performed by: SURGERY

## 2023-03-16 PROCEDURE — 6360000002 HC RX W HCPCS: Performed by: NURSE ANESTHETIST, CERTIFIED REGISTERED

## 2023-03-16 RX ORDER — PROPOFOL 10 MG/ML
INJECTION, EMULSION INTRAVENOUS PRN
Status: DISCONTINUED | OUTPATIENT
Start: 2023-03-16 | End: 2023-03-16 | Stop reason: SDUPTHER

## 2023-03-16 RX ORDER — SODIUM CHLORIDE 9 MG/ML
INJECTION, SOLUTION INTRAVENOUS CONTINUOUS PRN
Status: DISCONTINUED | OUTPATIENT
Start: 2023-03-16 | End: 2023-03-16 | Stop reason: SDUPTHER

## 2023-03-16 RX ADMIN — SODIUM CHLORIDE: 9 INJECTION, SOLUTION INTRAVENOUS at 08:30

## 2023-03-16 RX ADMIN — PROPOFOL 100 MG: 10 INJECTION, EMULSION INTRAVENOUS at 08:58

## 2023-03-16 RX ADMIN — PROPOFOL 100 MG: 10 INJECTION, EMULSION INTRAVENOUS at 08:54

## 2023-03-16 RX ADMIN — PROPOFOL 50 MG: 10 INJECTION, EMULSION INTRAVENOUS at 09:10

## 2023-03-16 RX ADMIN — PROPOFOL 50 MG: 10 INJECTION, EMULSION INTRAVENOUS at 09:03

## 2023-03-16 ASSESSMENT — PAIN SCALES - GENERAL
PAINLEVEL_OUTOF10: 0

## 2023-03-16 ASSESSMENT — PAIN - FUNCTIONAL ASSESSMENT: PAIN_FUNCTIONAL_ASSESSMENT: 0-10

## 2023-03-16 ASSESSMENT — LIFESTYLE VARIABLES: SMOKING_STATUS: 0

## 2023-03-16 NOTE — ANESTHESIA PRE PROCEDURE
Department of Anesthesiology  Preprocedure Note       Name:  Kaylee Gutiérrez   Age:  68 y.o.  :  1950                                          MRN:  [id number]         Date:  3/16/2023      Surgeon: Padmini Ramirez):  Bro Sarabia MD    Procedure: Procedure(s):  COLORECTAL CANCER SCREENING, NOT HIGH RISK    Medications prior to admission:   Prior to Admission medications    Medication Sig Start Date End Date Taking? Authorizing Provider   metoprolol succinate (TOPROL XL) 25 MG extended release tablet Take 1 tablet by mouth daily 3/6/23   Hadley Alberts MD   lisinopril-hydroCHLOROthiazide (PRINZIDE;ZESTORETIC) 20-12.5 MG per tablet TAKE ONE TABLET BY MOUTH IN THE MORNING 23   Hadley Alberts MD   atorvastatin (LIPITOR) 80 MG tablet TAKE ONE TABLET BY MOUTH DAILY 22   Hadley Alberts MD   ELIQUIS 5 MG TABS tablet TAKE ONE TABLET BY MOUTH TWO TIMES A DAY 22   Hadley Alberts MD   traZODone (DESYREL) 100 MG tablet TAKE 1 & 1/2 TABLETS BY MOUTH NIGHTLY AS NEEDED FOR SLEEP 22   Hadley Alberts MD   pioglitazone (ACTOS) 30 MG tablet Take 1 tablet by mouth daily 22   Hadley Alberts MD   ascorbic acid (VITAMIN C) 500 MG tablet Take 500 mg by mouth daily    Historical Provider, MD   potassium chloride (KLOR-CON M) 20 MEQ TBCR extended release tablet Take 1 tablet by mouth daily 10/14/21   Hadley Alberts MD   magnesium oxide (MAG-OX) 400 (240 Mg) MG tablet Take 1 tablet by mouth 2 times daily 18   Hadley Alberts MD   esomeprazole (NEXIUM) 40 MG delayed release capsule Take 40 mg by mouth daily Instructed to take morning of surgery with a sip of water    Historical Provider, MD       Current medications:    No current facility-administered medications for this encounter.        Allergies:  No Known Allergies    Problem List:    Patient Active Problem List   Diagnosis Code    Diabetes type 2, controlled ([facility] E11.9    Allergic rhinitis J30.9    Hyperlipidemia E78.5    Essential hypertension I10    GERD (gastroesophageal reflux disease) K21.9    Diverticulosis K57.90    Cholelithiasis K80.20    Erectile dysfunction N52.9    Fatigue R53.83    Persistent atrial fibrillation (HCC) I48.19    Class 1 obesity due to excess calories with serious comorbidity and body mass index (BMI) of 32.0 to 32.9 in adult E66.09, Z68.32    S/P ablation of atrial fibrillation Z98.890, Z86.79    Typical atrial flutter (HCC) I48.3    S/P ablation of atrial flutter Z98.890, Z86.79    Hypokalemia E87.6    Hypomagnesemia E83.42    Atrial fibrillation (HCC) I48.91       Past Medical History:        Diagnosis Date    Allergic rhinitis     Atrial fibrillation (HCC)     follows with EP    Cholelithiasis     noted on 3/11 CT    Diabetes type 2, controlled ([facility]     Diverticulosis     noted on 3/11 CT    GERD (gastroesophageal reflux disease)     Hyperlipidemia     Hypertension        Past Surgical History:        Procedure Laterality Date    ABLATION OF DYSRHYTHMIC FOCUS  2018    Afib/Aflutter ablation    APPENDECTOMY      COLONOSCOPY         Social History:    Social History     Tobacco Use    Smoking status: Former     Packs/day: 1.50     Years: 15.00     Pack years: 22.50     Types: Cigarettes     Quit date: 1997     Years since quittin.9    Smokeless tobacco: Never   Substance Use Topics    Alcohol use: Yes     Comment: drinks 12 beers or shots liquor per week                                Counseling given: Not Answered      Vital Signs (Current):   Vitals:    23 0946 23 0800   BP:  (!) 158/68   Pulse:  62   Resp:  16   Temp:  96.9 °F (36.1 °C)   TempSrc:  Temporal   SpO2:  99%   Weight: 195 lb (88.5 kg)    Height: 5' 9\" (1.753 m)                                               BP Readings from Last 3 Encounters:   23 (!) 158/68   23 128/70   22 (!) 103/50       NPO Status: Time of last liquid consumption: 0600 (sip with med)          Time of last solid consumption: 1900                        Date of last liquid consumption: 03/16/23                        Date of last solid food consumption: 03/14/23    BMI:   Wt Readings from Last 3 Encounters:   03/14/23 195 lb (88.5 kg)   02/20/23 204 lb 12.8 oz (92.9 kg)   11/11/22 202 lb (91.6 kg)     Body mass index is 28.8 kg/m².    CBC:   Lab Results   Component Value Date/Time    WBC 5.8 02/16/2022 12:00 PM    RBC 4.26 02/16/2022 12:00 PM    HGB 12.6 02/16/2022 12:00 PM    HCT 39.4 02/16/2022 12:00 PM    MCV 92.5 02/16/2022 12:00 PM    RDW 14.1 02/16/2022 12:00 PM     02/16/2022 12:00 PM       CMP:   Lab Results   Component Value Date/Time     09/14/2022 09:18 AM    K 4.6 09/14/2022 09:18 AM    K 4.0 08/02/2018 04:10 AM     09/14/2022 09:18 AM    CO2 24 09/14/2022 09:18 AM    BUN 11 09/14/2022 09:18 AM    CREATININE 1.2 09/14/2022 09:18 AM    GFRAA >60 09/14/2022 09:18 AM    LABGLOM 60 09/14/2022 09:18 AM    GLUCOSE 135 09/14/2022 09:18 AM    GLUCOSE 107 01/03/2012 03:00 PM    PROT 7.7 09/14/2022 09:18 AM    CALCIUM 9.9 09/14/2022 09:18 AM    BILITOT 0.4 09/14/2022 09:18 AM    ALKPHOS 65 09/14/2022 09:18 AM    AST 20 09/14/2022 09:18 AM    ALT 14 09/14/2022 09:18 AM       POC Tests: No results for input(s): POCGLU, POCNA, POCK, POCCL, POCBUN, POCHEMO, POCHCT in the last 72 hours.    Coags:   Lab Results   Component Value Date/Time    PROTIME 16.7 08/02/2018 04:10 AM    INR 1.5 08/02/2018 04:10 AM    APTT 33.5 07/27/2018 09:30 AM       HCG (If Applicable): No results found for: PREGTESTUR, PREGSERUM, HCG, HCGQUANT     ABGs: No results found for: PHART, PO2ART, FOE8WWM, GOP0LZH, BEART, C9ZDNHTY     Type & Screen (If Applicable):  No results found for: LABABO, LABRH    Drug/Infectious Status (If Applicable):  No results found for: HIV, HEPCAB    COVID-19 Screening (If Applicable): No results found for: COVID19        Anesthesia Evaluation  Patient summary reviewed no history of  anesthetic complications:   Airway: Mallampati: II  TM distance: >3 FB   Neck ROM: full  Mouth opening: > = 3 FB   Dental: normal exam         Pulmonary:Negative Pulmonary ROS breath sounds clear to auscultation      (-) not a current smoker                           Cardiovascular:    (+) hypertension:, dysrhythmias (S/P ablation of atrial fibrillation / flutter): atrial fibrillation, hyperlipidemia        Rhythm: regular  Rate: normal                    Neuro/Psych:   Negative Neuro/Psych ROS              GI/Hepatic/Renal:   (+) GERD:, bowel prep,          ROS comment: Screen for colon cancer (Z12.11)    Diverticulosis  Cholelithiasis. Endo/Other:    (+) DiabetesType II DM, , .                 Abdominal:             Vascular: negative vascular ROS. Other Findings:           Anesthesia Plan      MAC     ASA 3       Induction: intravenous. Anesthetic plan and risks discussed with patient. Plan discussed with CRNA.                 Patient seen and evaluated ROQUE Hanna MD   3/16/2023

## 2023-03-16 NOTE — ANESTHESIA POSTPROCEDURE EVALUATION
Department of Anesthesiology  Postprocedure Note    Patient: Kirby Smith  MRN: [id number]  YOB: 1950  Date of evaluation: 3/16/2023      Procedure Summary     Date: 03/16/23 Room / Location: [facility] / [facility]    Anesthesia Start: 4778 Anesthesia Stop:     Procedure: COLONOSCOPY WITH BIOPSY Diagnosis:       Screen for colon cancer      (Screen for colon cancer [Z12.11])    Surgeons: Cecile Roca MD Responsible Provider: Toi Phillip MD    Anesthesia Type: MAC ASA Status: 3          Anesthesia Type: No value filed.     Bryce Phase I: Bryce Score: 10    Bryce Phase II:        Anesthesia Post Evaluation    Patient location during evaluation: PACU  Patient participation: complete - patient participated  Level of consciousness: awake  Airway patency: patent  Nausea & Vomiting: no nausea and no vomiting  Complications: no  Cardiovascular status: hemodynamically stable  Respiratory status: acceptable  Hydration status: euvolemic

## 2023-03-16 NOTE — H&P
[facility] Clinic Note     Assessment/Plan:        Diagnosis Orders   1. History of colon polyps        We will plan for colonoscopy                 Return for Colonoscopy. Chief Complaint   Patient presents with    New Patient    Colonoscopy       consult         PCP: Sanjana Glass MD     HPI: Jessica Ryan is a 71 y.o. male who presents in consultation for history of colon polyps. His last colonoscopy was 6 years ago with Dr. Stanley Chapa. He says polyps were removed. He denies any issues. He does have chronic loose stools that he says are from medications. He has no melena or hematochezia. There is no abdominal pain or unintentional weight loss. There are no bowel caliber changes. There is no family history of colon cancer or inflammatory bowel disease. Past Medical History        Past Medical History:   Diagnosis Date    Allergic rhinitis      Atrial fibrillation ([address]       follows with EP    Cholelithiasis       noted on 3/11 CT    COPD (chronic obstructive pulmonary disease) ([facility]       early stages    Diabetes type 2, controlled ([facility]      Diverticulosis       noted on 3/11 CT    GERD (gastroesophageal reflux disease)      Hyperlipidemia      Hypertension              Past Surgical History         Past Surgical History:   Procedure Laterality Date    ABLATION OF DYSRHYTHMIC FOCUS   08/01/2018     Afib/Aflutter ablation    APPENDECTOMY        COLONOSCOPY                Home Medications           Prior to Admission medications    Medication Sig Start Date End Date Taking? Authorizing Provider   traZODone (DESYREL) 100 MG tablet TAKE 1 & 1/2 TABLETS BY MOUTH NIGHTLY AS NEEDED FOR SLEEP 11/7/22   Yes Sanjana Glass MD   pioglitazone (ACTOS) 30 MG tablet Take 1 tablet by mouth daily 9/20/22   Yes Sanjana Glass MD   lisinopril-hydroCHLOROthiazide (PRINZIDE;ZESTORETIC) 20-12.5 MG per tablet Take 1 tablet by mouth in the morning.  8/15/22   Yes Sanjana Glass MD   apixaban (ELIQUIS) 5 MG TABS tablet Take 1 tablet by mouth 2 times daily for 1 day [id number]        Lot [id number]  Exp 22 Yes Daniel Duarte MD   atorvastatin (LIPITOR) 80 MG tablet Take 1 tablet by mouth daily 22   Yes Corie Cisneros MD   metoprolol succinate (TOPROL XL) 25 MG extended release tablet Take 1 tablet by mouth daily 3/15/22   Yes Corie Cisneros MD   ascorbic acid (VITAMIN C) 500 MG tablet Take 500 mg by mouth daily     Yes Historical Provider, MD   potassium chloride (KLOR-CON M) 20 MEQ TBCR extended release tablet Take 1 tablet by mouth daily 10/14/21   Yes Corie Cisneros MD   magnesium oxide (MAG-OX) 400 (240 Mg) MG tablet Take 1 tablet by mouth 2 times daily 18   Yes Corie Cisneros MD   esomeprazole ([address]) 40 MG delayed release capsule Take 40 mg by mouth daily Instructed to take morning of surgery with a sip of water     Yes Historical Provider, MD            No Known Allergies     Social History   Social History            Socioeconomic History    Marital status:        Spouse name: None    Number of children: None    Years of education: None    Highest education level: None   Tobacco Use    Smoking status: Former       Packs/day: 1.50       Years: 15.00       Pack years: 22.50       Types: Cigarettes       Quit date: 1997       Years since quittin.6    Smokeless tobacco: Never   Vaping Use    Vaping Use: Never used   Substance and Sexual Activity    Alcohol use: Yes       Comment: moderate alcohol use / drinks 12 beers or shots liquor per week    Drug use: No      Social Determinants of Health          Financial Resource Strain: Low Risk     Difficulty of Paying Living Expenses: Not hard at all   Food Insecurity: No Food Insecurity    Worried About Running Out of Food in the Last Year: Never true    Ran Out of Food in the Last Year: Never true   Physical Activity: Sufficiently Active    Days of Exercise per Week: 5 days    Minutes of Exercise per Session: 40 min            Family History         Family History   Problem Relation Age of Onset    Dementia Mother      Heart Disease Father      Other Brother           elevated PSA    Cancer Brother           T cell lymphoma             Review of Systems   All other systems reviewed and are negative. Objective:  Vitals       Vitals:     11/11/22 0857   BP: (!) 103/50   Pulse: 51   Resp: 16   Weight: 202 lb (91.6 kg)   Height: 5' 9\" (1.753 m)            Physical Exam  Constitutional:       General: He is not in acute distress. Appearance: He is not diaphoretic. HENT:      Head: Normocephalic and atraumatic. Eyes:      General:         Right eye: No discharge. Left eye: No discharge. Neck:      Trachea: No tracheal deviation. Cardiovascular:      Rate and Rhythm: Normal rate. Pulmonary:      Effort: Pulmonary effort is normal. No respiratory distress. Abdominal:      General: There is no distension. Palpations: Abdomen is soft. Tenderness: There is no abdominal tenderness. There is no guarding or rebound. Skin:     General: Skin is warm and dry. Neurological:      Mental Status: He is alert and oriented to person, place, and time. Maudry Kussmaul, MD     I have examined the patient and performed the key aspects of physical exam, reviewed the record (including all pertinent and new radiology images and laboratory findings), and discussed the case with the primary and referring provider where applicable. NOTE: This report, in part or full,may have been transcribed using voice recognition software. Every effort was made to ensure accuracy; however, inadvertent computerized transcription errors may be present. Please excuse any transcriptional grammatical or spelling errors that may have escaped my editorial review.      CC: Dottie Ayers MD

## 2023-03-16 NOTE — OP NOTE
Colonoscopy Op Note  PATIENT: Evelena Lunch    DATE OF PROCEDURE: 3/16/2023    SURGEON: Becca Toth MD    PREOPERATIVE DIAGNOSIS:  History of colon polyps    POSTOPERATIVE DIAGNOSIS: Same, colon polyps, diverticulosis, grade 1 hemorrhoids    OPERATION: Procedure(s):  COLONOSCOPY WITH BIOPSY    ANESTHESIA: Local monitored anesthesia. ESTIMATED BLOOD LOSS: nil     COMPLICATIONS: None. SPECIMENS:   ID Type Source Tests Collected by Time Destination   A : Transverse Colon Polyp Bx Tissue Colon SURGICAL PATHOLOGY Becca Toth MD 3/16/2023 0900    B : Hepatic Flexure Polyp Bx Tissue Colon SURGICAL PATHOLOGY Becca Toth MD 3/16/2023 0901    C : Splenic Flexure Polyp Bx Tissue Colon SURGICAL PATHOLOGY Becca Toth MD 3/16/2023 0908    D : Sigmoid Polypectomy X2 Tissue Colon SURGICAL PATHOLOGY Becca Toth MD 3/16/2023 0913        HISTORY: The patient is a 68y.o. year old male with history of above preop diagnosis. I recommended colonoscopy with possible biopsy or polypectomy and I explained the risk, benefits, expected outcome, and alternatives to the procedure. Risks included but are not limited to bleeding, infection, respiratory distress, hypotension, and perforation of the colon. The patient understands and is in agreement. PROCEDURE: The patient was given IV conscious sedation per anesthesia. The patient was given supplemental oxygen by nasal cannula. The colonoscope was inserted per rectum and advanced under direct vision to the cecum without difficulty, identified by appendiceal orifice and ileocecal valve. The prep was good so exam was adequate. FINDINGS:    DANIAL: normal    Colon: At the hepatic flexure in the proximal transverse colon there is a diminutive polyp status post forcep polypectomy. In the mid transverse colon there is a diminutive polyp status post forcep polypectomy. In the splenic flexure there is a diminutive polyp status post forcep polypectomy.   In the sigmoid there are 2 polyps approximately 4 to 5 mm status post hot snare polypectomy. There is diverticulosis. Rectum/Anus: examined in normal and retroflexed positions -grade 1 hemorrhoids    The colon was decompressed and the scope was removed. The withdraw time was approximately 9 minutes. The patient tolerated the procedure well. ASSESSMENT/PLAN:   Follow-up pathology  Fiber diet  Colorectal Cancer Screening - recommend repeat colonoscopy in 3 years (may change pending biopsy results). Sooner if issues/concerns.     Jana Simmons MD  03/16/23  9:15 AM

## 2023-03-20 PROBLEM — D36.9 TUBULAR ADENOMA: Status: ACTIVE | Noted: 2023-03-20

## 2023-03-20 PROBLEM — K63.5 HYPERPLASTIC COLONIC POLYP: Status: ACTIVE | Noted: 2023-03-20

## 2023-03-22 DIAGNOSIS — I10 ESSENTIAL HYPERTENSION: ICD-10-CM

## 2023-03-22 DIAGNOSIS — Z12.5 SCREENING FOR MALIGNANT NEOPLASM OF PROSTATE: ICD-10-CM

## 2023-03-22 DIAGNOSIS — E11.65 CONTROLLED TYPE 2 DIABETES MELLITUS WITH HYPERGLYCEMIA, UNSPECIFIED WHETHER LONG TERM INSULIN USE (HCC): ICD-10-CM

## 2023-03-22 DIAGNOSIS — E78.2 MIXED HYPERLIPIDEMIA: ICD-10-CM

## 2023-03-22 LAB
ALBUMIN SERPL-MCNC: 4.3 G/DL (ref 3.5–5.2)
ALP SERPL-CCNC: 61 U/L (ref 40–129)
ALT SERPL-CCNC: 12 U/L (ref 0–40)
ANION GAP SERPL CALCULATED.3IONS-SCNC: 15 MMOL/L (ref 7–16)
AST SERPL-CCNC: 18 U/L (ref 0–39)
BASOPHILS # BLD: 0.06 E9/L (ref 0–0.2)
BASOPHILS NFR BLD: 0.9 % (ref 0–2)
BILIRUB SERPL-MCNC: 0.3 MG/DL (ref 0–1.2)
BUN SERPL-MCNC: 15 MG/DL (ref 6–23)
CALCIUM SERPL-MCNC: 9.7 MG/DL (ref 8.6–10.2)
CHLORIDE SERPL-SCNC: 98 MMOL/L (ref 98–107)
CHOLESTEROL, TOTAL: 159 MG/DL (ref 0–199)
CO2 SERPL-SCNC: 25 MMOL/L (ref 22–29)
CREAT SERPL-MCNC: 1.3 MG/DL (ref 0.7–1.2)
EOSINOPHIL # BLD: 0.6 E9/L (ref 0.05–0.5)
EOSINOPHIL NFR BLD: 9.2 % (ref 0–6)
ERYTHROCYTE [DISTWIDTH] IN BLOOD BY AUTOMATED COUNT: 12.9 FL (ref 11.5–15)
GLUCOSE SERPL-MCNC: 108 MG/DL (ref 74–99)
HBA1C MFR BLD: 6.4 % (ref 4–5.6)
HCT VFR BLD AUTO: 37.9 % (ref 37–54)
HDLC SERPL-MCNC: 43 MG/DL
HGB BLD-MCNC: 12.5 G/DL (ref 12.5–16.5)
IMM GRANULOCYTES # BLD: 0.03 E9/L
IMM GRANULOCYTES NFR BLD: 0.5 % (ref 0–5)
LDLC SERPL CALC-MCNC: 94 MG/DL (ref 0–99)
LYMPHOCYTES # BLD: 1.7 E9/L (ref 1.5–4)
LYMPHOCYTES NFR BLD: 26.2 % (ref 20–42)
MCH RBC QN AUTO: 31 PG (ref 26–35)
MCHC RBC AUTO-ENTMCNC: 33 % (ref 32–34.5)
MCV RBC AUTO: 94 FL (ref 80–99.9)
MONOCYTES # BLD: 0.65 E9/L (ref 0.1–0.95)
MONOCYTES NFR BLD: 10 % (ref 2–12)
NEUTROPHILS # BLD: 3.45 E9/L (ref 1.8–7.3)
NEUTS SEG NFR BLD: 53.2 % (ref 43–80)
PLATELET # BLD AUTO: 155 E9/L (ref 130–450)
PMV BLD AUTO: 11.1 FL (ref 7–12)
POTASSIUM SERPL-SCNC: 4.1 MMOL/L (ref 3.5–5)
PROT SERPL-MCNC: 6.9 G/DL (ref 6.4–8.3)
PSA SERPL-MCNC: 2.2 NG/ML (ref 0–4)
RBC # BLD AUTO: 4.03 E12/L (ref 3.8–5.8)
SODIUM SERPL-SCNC: 138 MMOL/L (ref 132–146)
TRIGL SERPL-MCNC: 112 MG/DL (ref 0–149)
VLDLC SERPL CALC-MCNC: 22 MG/DL
WBC # BLD: 6.5 E9/L (ref 4.5–11.5)

## 2023-03-28 ENCOUNTER — OFFICE VISIT (OUTPATIENT)
Dept: FAMILY MEDICINE CLINIC | Age: 73
End: 2023-03-28
Payer: MEDICARE

## 2023-03-28 VITALS
HEIGHT: 69 IN | OXYGEN SATURATION: 97 % | DIASTOLIC BLOOD PRESSURE: 60 MMHG | SYSTOLIC BLOOD PRESSURE: 129 MMHG | BODY MASS INDEX: 30.36 KG/M2 | RESPIRATION RATE: 16 BRPM | HEART RATE: 52 BPM | TEMPERATURE: 97.3 F | WEIGHT: 205 LBS

## 2023-03-28 DIAGNOSIS — N18.2 STAGE 2 CHRONIC KIDNEY DISEASE: ICD-10-CM

## 2023-03-28 DIAGNOSIS — N52.9 VASCULOGENIC ERECTILE DYSFUNCTION, UNSPECIFIED VASCULOGENIC ERECTILE DYSFUNCTION TYPE: ICD-10-CM

## 2023-03-28 DIAGNOSIS — I48.0 PAROXYSMAL ATRIAL FIBRILLATION (HCC): ICD-10-CM

## 2023-03-28 DIAGNOSIS — G47.00 INSOMNIA, UNSPECIFIED TYPE: ICD-10-CM

## 2023-03-28 DIAGNOSIS — E11.65 CONTROLLED TYPE 2 DIABETES MELLITUS WITH HYPERGLYCEMIA, UNSPECIFIED WHETHER LONG TERM INSULIN USE (HCC): Primary | ICD-10-CM

## 2023-03-28 PROBLEM — F33.0 MAJOR DEPRESSIVE DISORDER, RECURRENT, MILD (HCC): Status: ACTIVE | Noted: 2023-03-28

## 2023-03-28 PROCEDURE — 3044F HG A1C LEVEL LT 7.0%: CPT | Performed by: FAMILY MEDICINE

## 2023-03-28 PROCEDURE — 1123F ACP DISCUSS/DSCN MKR DOCD: CPT | Performed by: FAMILY MEDICINE

## 2023-03-28 PROCEDURE — 99214 OFFICE O/P EST MOD 30 MIN: CPT | Performed by: FAMILY MEDICINE

## 2023-03-28 PROCEDURE — 3074F SYST BP LT 130 MM HG: CPT | Performed by: FAMILY MEDICINE

## 2023-03-28 PROCEDURE — 3078F DIAST BP <80 MM HG: CPT | Performed by: FAMILY MEDICINE

## 2023-03-28 RX ORDER — SILDENAFIL 50 MG/1
TABLET, FILM COATED ORAL
Qty: 10 TABLET | Refills: 5 | Status: SHIPPED | OUTPATIENT
Start: 2023-03-28

## 2023-03-28 SDOH — ECONOMIC STABILITY: INCOME INSECURITY: HOW HARD IS IT FOR YOU TO PAY FOR THE VERY BASICS LIKE FOOD, HOUSING, MEDICAL CARE, AND HEATING?: NOT HARD AT ALL

## 2023-03-28 SDOH — ECONOMIC STABILITY: FOOD INSECURITY: WITHIN THE PAST 12 MONTHS, YOU WORRIED THAT YOUR FOOD WOULD RUN OUT BEFORE YOU GOT MONEY TO BUY MORE.: NEVER TRUE

## 2023-03-28 SDOH — ECONOMIC STABILITY: HOUSING INSECURITY
IN THE LAST 12 MONTHS, WAS THERE A TIME WHEN YOU DID NOT HAVE A STEADY PLACE TO SLEEP OR SLEPT IN A SHELTER (INCLUDING NOW)?: NO

## 2023-03-28 SDOH — ECONOMIC STABILITY: FOOD INSECURITY: WITHIN THE PAST 12 MONTHS, THE FOOD YOU BOUGHT JUST DIDN'T LAST AND YOU DIDN'T HAVE MONEY TO GET MORE.: NEVER TRUE

## 2023-03-28 ASSESSMENT — PATIENT HEALTH QUESTIONNAIRE - PHQ9
SUM OF ALL RESPONSES TO PHQ QUESTIONS 1-9: 0
SUM OF ALL RESPONSES TO PHQ9 QUESTIONS 1 & 2: 0
SUM OF ALL RESPONSES TO PHQ QUESTIONS 1-9: 0
2. FEELING DOWN, DEPRESSED OR HOPELESS: 0
SUM OF ALL RESPONSES TO PHQ QUESTIONS 1-9: 0
1. LITTLE INTEREST OR PLEASURE IN DOING THINGS: 0
SUM OF ALL RESPONSES TO PHQ QUESTIONS 1-9: 0

## 2023-03-28 ASSESSMENT — ENCOUNTER SYMPTOMS
BACK PAIN: 1
BLOOD IN STOOL: 0
TROUBLE SWALLOWING: 0
SHORTNESS OF BREATH: 0
ABDOMINAL PAIN: 0

## 2023-03-28 NOTE — PROGRESS NOTES
CC   Chief Complaint   Patient presents with    Diabetes    Hypertension    Atrial Fibrillation     HPI:   Patient comes in today for the below issue(s). Diabetes:  Type 2  Chronic issue, present for years  Patient feels well. Issue is well currently denying any neuropathy symptoms controlled. Patient does not have complaints or concerns today. Medications reviewed. Currently on Glucophage XR, Actos. We have tried other meds that he has stopped due to financial considerations. Taking all medications and tolerating well. Glucose screens being checked  Yes- no log though. hypoglycemic episodes no  Patient is reporting intermittent numbness and tingling in his extremities but otherwise denies no other symptoms such as vision changes polyuria polydipsia or polyphagia  Most recent labs reviewed with patient. Lab Results   Component Value Date    LABA1C 6.4 (H) 03/22/2023     No results found for: EAG  His labs do show stage II CKD. No specific concerns are identified    Afib, hyperlipidemia, hypertension  Follow up  Doing well. Saw cardiology in February- note reviewed. Feels well, again asks if he can be getting off of Eliquis citing cost and concern about long-term medication side effects no further lightheadedness  Denies chest pain or palpitation   He is not checking his blood pressures at home  Reports compliance with meds including DOAC    ED  New issue  Describes an issue with maintaining erection.   Typically able to get an erection but is having trouble maintaining it and even having ejaculation  Reports sexual desire  Denies any issues with testicle pain penile pain or discharge  Is on a beta-blocker, also has diabetes    Insomnia  Follow-up  Stable on trazodone  States mood is good  Only using trazodone on as-needed basis  Usually if he cannot sleep 2 or 3 nights in a row then he will consider using it    Review of Systems  Review of Systems   Constitutional:  Negative for chills,

## 2023-03-31 ENCOUNTER — OFFICE VISIT (OUTPATIENT)
Dept: SURGERY | Age: 73
End: 2023-03-31
Payer: MEDICARE

## 2023-03-31 VITALS
SYSTOLIC BLOOD PRESSURE: 113 MMHG | BODY MASS INDEX: 30.42 KG/M2 | DIASTOLIC BLOOD PRESSURE: 58 MMHG | HEART RATE: 52 BPM | WEIGHT: 206 LBS

## 2023-03-31 DIAGNOSIS — K63.5 COLORECTAL POLYP DETECTED ON COLONOSCOPY: Primary | ICD-10-CM

## 2023-03-31 DIAGNOSIS — K64.9 HEMORRHOIDS, UNSPECIFIED HEMORRHOID TYPE: ICD-10-CM

## 2023-03-31 DIAGNOSIS — K57.30 DIVERTICULOSIS OF LARGE INTESTINE WITHOUT HEMORRHAGE: ICD-10-CM

## 2023-03-31 PROCEDURE — 3078F DIAST BP <80 MM HG: CPT | Performed by: SURGERY

## 2023-03-31 PROCEDURE — 1123F ACP DISCUSS/DSCN MKR DOCD: CPT | Performed by: SURGERY

## 2023-03-31 PROCEDURE — 3074F SYST BP LT 130 MM HG: CPT | Performed by: SURGERY

## 2023-03-31 PROCEDURE — 99213 OFFICE O/P EST LOW 20 MIN: CPT | Performed by: SURGERY

## 2023-04-03 NOTE — PROGRESS NOTES
education level: Not on file   Occupational History    Not on file   Tobacco Use    Smoking status: Former     Packs/day: 1.50     Years: 15.00     Pack years: 22.50     Types: Cigarettes     Quit date: 1997     Years since quittin.0    Smokeless tobacco: Never   Vaping Use    Vaping Use: Never used   Substance and Sexual Activity    Alcohol use: Yes     Comment: drinks 12 beers or shots liquor per week    Drug use: No    Sexual activity: Not on file   Other Topics Concern    Not on file   Social History Narrative    Not on file     Social Determinants of Health     Financial Resource Strain: Low Risk     Difficulty of Paying Living Expenses: Not hard at all   Food Insecurity: No Food Insecurity    Worried About [address] in the Last Year: Never true    [email or web address] in the Last Year: Never true   Transportation Needs: Unknown    Lack of Transportation (Medical): Not on file    Lack of Transportation (Non-Medical):  No   Physical Activity: Sufficiently Active    Days of Exercise per Week: 5 days    Minutes of Exercise per Session: 40 min   Stress: Not on file   Social Connections: Not on file   Intimate Partner Violence: Not on file   Housing Stability: Unknown    Unable to Pay for Housing in the Last Year: Not on file    Number of Places Lived in the Last Year: Not on file    Unstable Housing in the Last Year: No       No Known Allergies      Current Outpatient Medications   Medication Sig Dispense Refill    sildenafil (VIAGRA) 50 MG tablet 1/2 to 1 tablet po 30-60 min before sexual intercourse; max of 50 mg in 24 hours 10 tablet 5    metoprolol succinate (TOPROL XL) 25 MG extended release tablet Take 1 tablet by mouth daily 90 tablet 3    lisinopril-hydroCHLOROthiazide (PRINZIDE;ZESTORETIC) 20-12.5 MG per tablet TAKE ONE TABLET BY MOUTH IN THE MORNING 90 tablet 3    atorvastatin (LIPITOR) 80 MG tablet TAKE ONE TABLET BY MOUTH DAILY 90 tablet 3    ELIQUIS 5 MG TABS tablet TAKE ONE TABLET BY

## 2023-05-11 DIAGNOSIS — G47.00 INSOMNIA, UNSPECIFIED TYPE: ICD-10-CM

## 2023-05-11 RX ORDER — TRAZODONE HYDROCHLORIDE 100 MG/1
TABLET ORAL
Qty: 135 TABLET | Refills: 1 | Status: SHIPPED | OUTPATIENT
Start: 2023-05-11

## 2023-06-27 ENCOUNTER — TELEPHONE (OUTPATIENT)
Dept: CARDIOLOGY CLINIC | Age: 73
End: 2023-06-27

## 2023-07-03 NOTE — TELEPHONE ENCOUNTER
My recommendation is for continued anticoagulation with apixaban to reduce the risk of strokes. His stroke risk is still elevated even if he is maintaining normal rhythm, especially since he has had multiple recurrences of arrhythmias even after his extensive ablation, and this recommendation is consistent with recent guidelines.

## 2023-09-05 RX ORDER — PIOGLITAZONEHYDROCHLORIDE 30 MG/1
30 TABLET ORAL DAILY
Qty: 90 TABLET | Refills: 3 | Status: SHIPPED | OUTPATIENT
Start: 2023-09-05

## 2023-09-25 SDOH — HEALTH STABILITY: PHYSICAL HEALTH: ON AVERAGE, HOW MANY MINUTES DO YOU ENGAGE IN EXERCISE AT THIS LEVEL?: 50 MIN

## 2023-09-25 SDOH — HEALTH STABILITY: PHYSICAL HEALTH: ON AVERAGE, HOW MANY DAYS PER WEEK DO YOU ENGAGE IN MODERATE TO STRENUOUS EXERCISE (LIKE A BRISK WALK)?: 7 DAYS

## 2023-09-25 ASSESSMENT — LIFESTYLE VARIABLES
HOW MANY STANDARD DRINKS CONTAINING ALCOHOL DO YOU HAVE ON A TYPICAL DAY: 1 OR 2
HOW MANY STANDARD DRINKS CONTAINING ALCOHOL DO YOU HAVE ON A TYPICAL DAY: 1
HOW OFTEN DO YOU HAVE A DRINK CONTAINING ALCOHOL: 2-3 TIMES A WEEK
HOW OFTEN DO YOU HAVE SIX OR MORE DRINKS ON ONE OCCASION: 1
HOW OFTEN DO YOU HAVE A DRINK CONTAINING ALCOHOL: 4

## 2023-09-25 ASSESSMENT — PATIENT HEALTH QUESTIONNAIRE - PHQ9
SUM OF ALL RESPONSES TO PHQ QUESTIONS 1-9: 0
SUM OF ALL RESPONSES TO PHQ QUESTIONS 1-9: 0
1. LITTLE INTEREST OR PLEASURE IN DOING THINGS: 0
SUM OF ALL RESPONSES TO PHQ9 QUESTIONS 1 & 2: 0
SUM OF ALL RESPONSES TO PHQ QUESTIONS 1-9: 0
SUM OF ALL RESPONSES TO PHQ QUESTIONS 1-9: 0
2. FEELING DOWN, DEPRESSED OR HOPELESS: 0

## 2023-09-26 DIAGNOSIS — N18.2 STAGE 2 CHRONIC KIDNEY DISEASE: ICD-10-CM

## 2023-09-26 DIAGNOSIS — E11.65 CONTROLLED TYPE 2 DIABETES MELLITUS WITH HYPERGLYCEMIA, UNSPECIFIED WHETHER LONG TERM INSULIN USE (HCC): ICD-10-CM

## 2023-09-26 LAB
ABSOLUTE IMMATURE GRANULOCYTE: <0.03 K/UL (ref 0–0.58)
ALBUMIN SERPL-MCNC: 4.3 G/DL (ref 3.5–5.2)
ALP BLD-CCNC: 53 U/L (ref 40–129)
ALT SERPL-CCNC: 11 U/L (ref 0–40)
ANION GAP SERPL CALCULATED.3IONS-SCNC: 10 MMOL/L (ref 7–16)
AST SERPL-CCNC: 14 U/L (ref 0–39)
BASOPHILS ABSOLUTE: 0.06 K/UL (ref 0–0.2)
BASOPHILS RELATIVE PERCENT: 1 % (ref 0–2)
BILIRUB SERPL-MCNC: 0.3 MG/DL (ref 0–1.2)
BUN BLDV-MCNC: 13 MG/DL (ref 6–23)
CALCIUM SERPL-MCNC: 9.2 MG/DL (ref 8.6–10.2)
CHLORIDE BLD-SCNC: 101 MMOL/L (ref 98–107)
CHOLESTEROL: 163 MG/DL
CO2: 27 MMOL/L (ref 22–29)
CREAT SERPL-MCNC: 1.2 MG/DL (ref 0.7–1.2)
CREATININE URINE: 83.4 MG/DL (ref 40–278)
EOSINOPHILS ABSOLUTE: 0.61 K/UL (ref 0.05–0.5)
EOSINOPHILS RELATIVE PERCENT: 10 % (ref 0–6)
GFR SERPL CREATININE-BSD FRML MDRD: >60 ML/MIN/1.73M2
GLUCOSE BLD-MCNC: 127 MG/DL (ref 74–99)
HBA1C MFR BLD: 6.5 % (ref 4–5.6)
HCT VFR BLD CALC: 39 % (ref 37–54)
HDLC SERPL-MCNC: 40 MG/DL
HEMOGLOBIN: 12.8 G/DL (ref 12.5–16.5)
IMMATURE GRANULOCYTES: 0 % (ref 0–5)
LDL CHOLESTEROL: 97 MG/DL
LYMPHOCYTES ABSOLUTE: 1.82 K/UL (ref 1.5–4)
LYMPHOCYTES RELATIVE PERCENT: 30 % (ref 20–42)
MCH RBC QN AUTO: 30.5 PG (ref 26–35)
MCHC RBC AUTO-ENTMCNC: 32.8 G/DL (ref 32–34.5)
MCV RBC AUTO: 93.1 FL (ref 80–99.9)
MICROALBUMIN/CREAT 24H UR: <12 MG/L (ref 0–19)
MICROALBUMIN/CREAT UR-RTO: NORMAL MCG/MG CREAT (ref 0–30)
MONOCYTES ABSOLUTE: 0.65 K/UL (ref 0.1–0.95)
MONOCYTES RELATIVE PERCENT: 11 % (ref 2–12)
NEUTROPHILS ABSOLUTE: 2.99 K/UL (ref 1.8–7.3)
NEUTROPHILS RELATIVE PERCENT: 49 % (ref 43–80)
PDW BLD-RTO: 12.9 % (ref 11.5–15)
PLATELET # BLD: 163 K/UL (ref 130–450)
PMV BLD AUTO: 10.9 FL (ref 7–12)
POTASSIUM SERPL-SCNC: 4.3 MMOL/L (ref 3.5–5)
RBC # BLD: 4.19 M/UL (ref 3.8–5.8)
SODIUM BLD-SCNC: 138 MMOL/L (ref 132–146)
TOTAL PROTEIN: 7 G/DL (ref 6.4–8.3)
TRIGL SERPL-MCNC: 131 MG/DL
VLDLC SERPL CALC-MCNC: 26 MG/DL
WBC # BLD: 6.2 K/UL (ref 4.5–11.5)

## 2023-09-27 LAB
BILIRUBIN URINE: NEGATIVE
COLOR: YELLOW
GLUCOSE URINE: NEGATIVE MG/DL
KETONES, URINE: NEGATIVE MG/DL
LEUKOCYTE ESTERASE, URINE: NEGATIVE
NITRITE, URINE: NEGATIVE
PH UA: 6.5 (ref 5–9)
PROTEIN UA: NEGATIVE MG/DL
SPECIFIC GRAVITY UA: <1.005 (ref 1–1.03)
TURBIDITY: CLEAR
URINE HGB: NEGATIVE
UROBILINOGEN, URINE: 0.2 EU/DL (ref 0–1)

## 2023-09-28 ENCOUNTER — OFFICE VISIT (OUTPATIENT)
Dept: FAMILY MEDICINE CLINIC | Age: 73
End: 2023-09-28
Payer: MEDICARE

## 2023-09-28 VITALS
HEART RATE: 50 BPM | BODY MASS INDEX: 30.36 KG/M2 | DIASTOLIC BLOOD PRESSURE: 52 MMHG | WEIGHT: 205 LBS | TEMPERATURE: 97.1 F | RESPIRATION RATE: 16 BRPM | OXYGEN SATURATION: 97 % | SYSTOLIC BLOOD PRESSURE: 119 MMHG | HEIGHT: 69 IN

## 2023-09-28 DIAGNOSIS — G47.00 INSOMNIA, UNSPECIFIED TYPE: ICD-10-CM

## 2023-09-28 DIAGNOSIS — E78.2 MIXED HYPERLIPIDEMIA: ICD-10-CM

## 2023-09-28 DIAGNOSIS — Z12.5 SCREENING FOR MALIGNANT NEOPLASM OF PROSTATE: ICD-10-CM

## 2023-09-28 DIAGNOSIS — I10 ESSENTIAL HYPERTENSION: ICD-10-CM

## 2023-09-28 DIAGNOSIS — Z00.00 MEDICARE ANNUAL WELLNESS VISIT, SUBSEQUENT: Primary | ICD-10-CM

## 2023-09-28 DIAGNOSIS — E11.65 CONTROLLED TYPE 2 DIABETES MELLITUS WITH HYPERGLYCEMIA, UNSPECIFIED WHETHER LONG TERM INSULIN USE (HCC): ICD-10-CM

## 2023-09-28 DIAGNOSIS — I48.0 PAROXYSMAL ATRIAL FIBRILLATION (HCC): ICD-10-CM

## 2023-09-28 PROCEDURE — 3044F HG A1C LEVEL LT 7.0%: CPT | Performed by: FAMILY MEDICINE

## 2023-09-28 PROCEDURE — 3074F SYST BP LT 130 MM HG: CPT | Performed by: FAMILY MEDICINE

## 2023-09-28 PROCEDURE — 3078F DIAST BP <80 MM HG: CPT | Performed by: FAMILY MEDICINE

## 2023-09-28 PROCEDURE — G0439 PPPS, SUBSEQ VISIT: HCPCS | Performed by: FAMILY MEDICINE

## 2023-09-28 PROCEDURE — 1123F ACP DISCUSS/DSCN MKR DOCD: CPT | Performed by: FAMILY MEDICINE

## 2023-11-09 DIAGNOSIS — G47.00 INSOMNIA, UNSPECIFIED TYPE: ICD-10-CM

## 2023-11-09 RX ORDER — TRAZODONE HYDROCHLORIDE 100 MG/1
TABLET ORAL
Qty: 135 TABLET | Refills: 1 | Status: SHIPPED | OUTPATIENT
Start: 2023-11-09

## 2023-12-30 DIAGNOSIS — Z79.899 MEDICATION MANAGEMENT: ICD-10-CM

## 2023-12-31 DIAGNOSIS — Z79.899 MEDICATION MANAGEMENT: ICD-10-CM

## 2024-01-02 RX ORDER — ATORVASTATIN CALCIUM 80 MG/1
80 TABLET, FILM COATED ORAL DAILY
Qty: 90 TABLET | Refills: 3 | OUTPATIENT
Start: 2024-01-02

## 2024-01-02 RX ORDER — ATORVASTATIN CALCIUM 80 MG/1
TABLET, FILM COATED ORAL
Qty: 90 TABLET | Refills: 2 | Status: SHIPPED | OUTPATIENT
Start: 2024-01-02

## 2024-01-25 DIAGNOSIS — Z79.899 MEDICATION MANAGEMENT: ICD-10-CM

## 2024-01-25 DIAGNOSIS — I48.0 PAROXYSMAL ATRIAL FIBRILLATION (HCC): ICD-10-CM

## 2024-01-25 RX ORDER — LISINOPRIL AND HYDROCHLOROTHIAZIDE 20; 12.5 MG/1; MG/1
1 TABLET ORAL DAILY
Qty: 90 TABLET | Refills: 3 | Status: SHIPPED | OUTPATIENT
Start: 2024-01-25

## 2024-01-25 RX ORDER — METOPROLOL SUCCINATE 25 MG/1
25 TABLET, EXTENDED RELEASE ORAL DAILY
Qty: 90 TABLET | Refills: 3 | Status: SHIPPED | OUTPATIENT
Start: 2024-01-25

## 2024-03-25 DIAGNOSIS — E78.2 MIXED HYPERLIPIDEMIA: ICD-10-CM

## 2024-03-25 DIAGNOSIS — E11.65 CONTROLLED TYPE 2 DIABETES MELLITUS WITH HYPERGLYCEMIA, UNSPECIFIED WHETHER LONG TERM INSULIN USE (HCC): ICD-10-CM

## 2024-03-25 DIAGNOSIS — Z12.5 SCREENING FOR MALIGNANT NEOPLASM OF PROSTATE: ICD-10-CM

## 2024-03-25 LAB
ALBUMIN SERPL-MCNC: 4.6 G/DL (ref 3.5–5.2)
ALP BLD-CCNC: 59 U/L (ref 40–129)
ALT SERPL-CCNC: 9 U/L (ref 0–40)
ANION GAP SERPL CALCULATED.3IONS-SCNC: 15 MMOL/L (ref 7–16)
AST SERPL-CCNC: 11 U/L (ref 0–39)
BILIRUB SERPL-MCNC: 0.4 MG/DL (ref 0–1.2)
BUN BLDV-MCNC: 19 MG/DL (ref 6–23)
CALCIUM SERPL-MCNC: 9.9 MG/DL (ref 8.6–10.2)
CHLORIDE BLD-SCNC: 99 MMOL/L (ref 98–107)
CHOLESTEROL: 166 MG/DL
CO2: 26 MMOL/L (ref 22–29)
CREAT SERPL-MCNC: 1.3 MG/DL (ref 0.7–1.2)
GFR SERPL CREATININE-BSD FRML MDRD: 56 ML/MIN/1.73M2
GLUCOSE BLD-MCNC: 137 MG/DL (ref 74–99)
HBA1C MFR BLD: 6.9 % (ref 4–5.6)
HDLC SERPL-MCNC: 42 MG/DL
LDL CHOLESTEROL: 93 MG/DL
POTASSIUM SERPL-SCNC: 4.5 MMOL/L (ref 3.5–5)
PROSTATE SPECIFIC ANTIGEN: 2.74 NG/ML (ref 0–4)
SODIUM BLD-SCNC: 140 MMOL/L (ref 132–146)
TOTAL PROTEIN: 7.6 G/DL (ref 6.4–8.3)
TRIGL SERPL-MCNC: 153 MG/DL
VLDLC SERPL CALC-MCNC: 31 MG/DL

## 2024-03-28 ENCOUNTER — OFFICE VISIT (OUTPATIENT)
Dept: FAMILY MEDICINE CLINIC | Age: 74
End: 2024-03-28
Payer: MEDICARE

## 2024-03-28 VITALS
SYSTOLIC BLOOD PRESSURE: 138 MMHG | HEIGHT: 69 IN | TEMPERATURE: 98 F | HEART RATE: 58 BPM | WEIGHT: 208.78 LBS | OXYGEN SATURATION: 98 % | DIASTOLIC BLOOD PRESSURE: 58 MMHG | BODY MASS INDEX: 30.92 KG/M2 | RESPIRATION RATE: 16 BRPM

## 2024-03-28 DIAGNOSIS — E11.65 CONTROLLED TYPE 2 DIABETES MELLITUS WITH HYPERGLYCEMIA, UNSPECIFIED WHETHER LONG TERM INSULIN USE (HCC): ICD-10-CM

## 2024-03-28 DIAGNOSIS — G47.00 INSOMNIA, UNSPECIFIED TYPE: ICD-10-CM

## 2024-03-28 DIAGNOSIS — I10 ESSENTIAL HYPERTENSION: Primary | ICD-10-CM

## 2024-03-28 DIAGNOSIS — I48.0 PAROXYSMAL ATRIAL FIBRILLATION (HCC): ICD-10-CM

## 2024-03-28 DIAGNOSIS — E78.2 MIXED HYPERLIPIDEMIA: ICD-10-CM

## 2024-03-28 DIAGNOSIS — K63.5 HYPERPLASTIC COLONIC POLYP, UNSPECIFIED PART OF COLON: ICD-10-CM

## 2024-03-28 PROBLEM — K51.40 PSEUDOPOLYPOSIS OF COLON WITHOUT COMPLICATION, UNSPECIFIED PART OF COLON (HCC): Status: ACTIVE | Noted: 2024-03-28

## 2024-03-28 PROCEDURE — 3044F HG A1C LEVEL LT 7.0%: CPT | Performed by: FAMILY MEDICINE

## 2024-03-28 PROCEDURE — 3078F DIAST BP <80 MM HG: CPT | Performed by: FAMILY MEDICINE

## 2024-03-28 PROCEDURE — 99214 OFFICE O/P EST MOD 30 MIN: CPT | Performed by: FAMILY MEDICINE

## 2024-03-28 PROCEDURE — 3075F SYST BP GE 130 - 139MM HG: CPT | Performed by: FAMILY MEDICINE

## 2024-03-28 PROCEDURE — G2211 COMPLEX E/M VISIT ADD ON: HCPCS | Performed by: FAMILY MEDICINE

## 2024-03-28 PROCEDURE — 1123F ACP DISCUSS/DSCN MKR DOCD: CPT | Performed by: FAMILY MEDICINE

## 2024-03-28 RX ORDER — ASPIRIN 325 MG
325 TABLET ORAL DAILY
COMMUNITY

## 2024-03-28 SDOH — ECONOMIC STABILITY: FOOD INSECURITY: WITHIN THE PAST 12 MONTHS, YOU WORRIED THAT YOUR FOOD WOULD RUN OUT BEFORE YOU GOT MONEY TO BUY MORE.: NEVER TRUE

## 2024-03-28 SDOH — ECONOMIC STABILITY: FOOD INSECURITY: WITHIN THE PAST 12 MONTHS, THE FOOD YOU BOUGHT JUST DIDN'T LAST AND YOU DIDN'T HAVE MONEY TO GET MORE.: NEVER TRUE

## 2024-03-28 SDOH — ECONOMIC STABILITY: INCOME INSECURITY: HOW HARD IS IT FOR YOU TO PAY FOR THE VERY BASICS LIKE FOOD, HOUSING, MEDICAL CARE, AND HEATING?: NOT HARD AT ALL

## 2024-03-28 ASSESSMENT — PATIENT HEALTH QUESTIONNAIRE - PHQ9
SUM OF ALL RESPONSES TO PHQ QUESTIONS 1-9: 0
SUM OF ALL RESPONSES TO PHQ QUESTIONS 1-9: 0
SUM OF ALL RESPONSES TO PHQ9 QUESTIONS 1 & 2: 0
1. LITTLE INTEREST OR PLEASURE IN DOING THINGS: NOT AT ALL
SUM OF ALL RESPONSES TO PHQ QUESTIONS 1-9: 0
2. FEELING DOWN, DEPRESSED OR HOPELESS: NOT AT ALL
SUM OF ALL RESPONSES TO PHQ QUESTIONS 1-9: 0

## 2024-03-28 ASSESSMENT — ENCOUNTER SYMPTOMS
SHORTNESS OF BREATH: 0
BACK PAIN: 0
ABDOMINAL PAIN: 0

## 2024-03-28 NOTE — PROGRESS NOTES
Javier Wan is a 74 y.o. year old male Established patient, here for evaluation of the following chief complaint(s):    Chief Complaint   Patient presents with    Diabetes    Hypertension         Javier was seen today for diabetes and hypertension.    Diagnoses and all orders for this visit:    Essential hypertension  -     CBC with Auto Differential; Future  -     Comprehensive Metabolic Panel; Future  -     Lipid Panel; Future    Controlled type 2 diabetes mellitus with hyperglycemia, unspecified whether long term insulin use (HCC)  -     Hemoglobin A1C; Future    Insomnia, unspecified type    Paroxysmal atrial fibrillation (HCC)    Mixed hyperlipidemia    Hyperplastic colonic polyp, unspecified part of colon      Plan  Patient having morning elevations of his hypertension.  Modest but still significant.  Will make a change to have him take his metoprolol at dinner instead of in the morning.  Follow his blood pressure for the next 2 weeks and report readings.  If still elevated, may consider increasing his lisinopril/HCTZ.    Paroxysmal atrial fibrillation is reasonably stable.  Cardiology indicated he does not need to be on Eliquis but remain on high-dose aspirin.  He will follow-up with them on this matter.    Type 2 diabetes is stable.  Continue with current treatment regimen of Actos    Hyperlipidemia is stable.  Continue with high-dose atorvastatin.    Insomnia issues are ongoing, unchanged.  Continue current treatment plan of as needed trazodone    Colon polyps are being followed by surgery.  Scope in 4 years.    Health maintenance issues reviewed.  Declines vaccines, but may consider Tdap at pharmacy    Labs ordered to be done before next office visit    Patient/guardian was given the opportunity to ask questions regarding the visit today.  Questions were addressed.  They verbalized comfort and understanding of the assessment and plan.    Return for keep next appt as scheduled or see sooner if

## 2024-03-28 NOTE — PATIENT INSTRUCTIONS
Move the metoprolol to evening and follow up with blood pressure readings in 2 to 3 weeks with an update on your blood pressure readings.

## 2024-04-18 ENCOUNTER — PATIENT MESSAGE (OUTPATIENT)
Dept: FAMILY MEDICINE CLINIC | Age: 74
End: 2024-04-18

## 2024-04-18 RX ORDER — FLUTICASONE PROPIONATE 50 MCG
SPRAY, SUSPENSION (ML) NASAL
Qty: 3 EACH | Refills: 3 | Status: SHIPPED | OUTPATIENT
Start: 2024-04-18

## 2024-04-18 NOTE — TELEPHONE ENCOUNTER
From: Javier Wan  To: Dr. Maxx Ferrera  Sent: 4/18/2024 10:20 AM EDT  Subject: Flonase    Can you please order this from [facility] for me. Thank you

## 2024-08-26 RX ORDER — PIOGLITAZONEHYDROCHLORIDE 30 MG/1
30 TABLET ORAL DAILY
Qty: 90 TABLET | Refills: 3 | Status: SHIPPED | OUTPATIENT
Start: 2024-08-26

## 2024-09-15 DIAGNOSIS — Z79.899 MEDICATION MANAGEMENT: ICD-10-CM

## 2024-09-16 RX ORDER — ATORVASTATIN CALCIUM 80 MG/1
80 TABLET, FILM COATED ORAL DAILY
Qty: 90 TABLET | Refills: 3 | Status: SHIPPED | OUTPATIENT
Start: 2024-09-16

## 2024-10-02 DIAGNOSIS — I10 ESSENTIAL HYPERTENSION: ICD-10-CM

## 2024-10-02 DIAGNOSIS — E11.65 CONTROLLED TYPE 2 DIABETES MELLITUS WITH HYPERGLYCEMIA, UNSPECIFIED WHETHER LONG TERM INSULIN USE (HCC): ICD-10-CM

## 2024-10-02 LAB
ALBUMIN: 4.5 G/DL (ref 3.5–5.2)
ALP BLD-CCNC: 53 U/L (ref 40–129)
ALT SERPL-CCNC: 13 U/L (ref 0–40)
ANION GAP SERPL CALCULATED.3IONS-SCNC: 15 MMOL/L (ref 7–16)
AST SERPL-CCNC: 18 U/L (ref 0–39)
BASOPHILS ABSOLUTE: 0.06 K/UL (ref 0–0.2)
BASOPHILS RELATIVE PERCENT: 1 % (ref 0–2)
BILIRUB SERPL-MCNC: 0.3 MG/DL (ref 0–1.2)
BUN BLDV-MCNC: 19 MG/DL (ref 6–23)
CALCIUM SERPL-MCNC: 9.6 MG/DL (ref 8.6–10.2)
CHLORIDE BLD-SCNC: 100 MMOL/L (ref 98–107)
CHOLESTEROL, TOTAL: 165 MG/DL
CO2: 25 MMOL/L (ref 22–29)
CREAT SERPL-MCNC: 1.2 MG/DL (ref 0.7–1.2)
EOSINOPHILS ABSOLUTE: 0.65 K/UL (ref 0.05–0.5)
EOSINOPHILS RELATIVE PERCENT: 11 % (ref 0–6)
GFR, ESTIMATED: 63 ML/MIN/1.73M2
GLUCOSE BLD-MCNC: 124 MG/DL (ref 74–99)
HBA1C MFR BLD: 6.7 % (ref 4–5.6)
HCT VFR BLD CALC: 41.3 % (ref 37–54)
HDLC SERPL-MCNC: 40 MG/DL
HEMOGLOBIN: 13 G/DL (ref 12.5–16.5)
IMMATURE GRANULOCYTES %: 1 % (ref 0–5)
IMMATURE GRANULOCYTES ABSOLUTE: 0.03 K/UL (ref 0–0.58)
LDL CHOLESTEROL: 100 MG/DL
LYMPHOCYTES ABSOLUTE: 1.7 K/UL (ref 1.5–4)
LYMPHOCYTES RELATIVE PERCENT: 28 % (ref 20–42)
MCH RBC QN AUTO: 30.4 PG (ref 26–35)
MCHC RBC AUTO-ENTMCNC: 31.5 G/DL (ref 32–34.5)
MCV RBC AUTO: 96.7 FL (ref 80–99.9)
MONOCYTES ABSOLUTE: 0.59 K/UL (ref 0.1–0.95)
MONOCYTES RELATIVE PERCENT: 10 % (ref 2–12)
NEUTROPHILS ABSOLUTE: 3.03 K/UL (ref 1.8–7.3)
NEUTROPHILS RELATIVE PERCENT: 50 % (ref 43–80)
PDW BLD-RTO: 13.2 % (ref 11.5–15)
PLATELET # BLD: 178 K/UL (ref 130–450)
PMV BLD AUTO: 10.9 FL (ref 7–12)
POTASSIUM SERPL-SCNC: 4.5 MMOL/L (ref 3.5–5)
RBC # BLD: 4.27 M/UL (ref 3.8–5.8)
SODIUM BLD-SCNC: 140 MMOL/L (ref 132–146)
TOTAL PROTEIN: 7.3 G/DL (ref 6.4–8.3)
TRIGL SERPL-MCNC: 126 MG/DL
VLDLC SERPL CALC-MCNC: 25 MG/DL
WBC # BLD: 6.1 K/UL (ref 4.5–11.5)

## 2024-10-05 SDOH — HEALTH STABILITY: PHYSICAL HEALTH: ON AVERAGE, HOW MANY DAYS PER WEEK DO YOU ENGAGE IN MODERATE TO STRENUOUS EXERCISE (LIKE A BRISK WALK)?: 5 DAYS

## 2024-10-05 SDOH — HEALTH STABILITY: PHYSICAL HEALTH: ON AVERAGE, HOW MANY MINUTES DO YOU ENGAGE IN EXERCISE AT THIS LEVEL?: 30 MIN

## 2024-10-05 ASSESSMENT — LIFESTYLE VARIABLES
HOW OFTEN DO YOU HAVE A DRINK CONTAINING ALCOHOL: 2-3 TIMES A WEEK
HOW MANY STANDARD DRINKS CONTAINING ALCOHOL DO YOU HAVE ON A TYPICAL DAY: 1
HOW OFTEN DO YOU HAVE A DRINK CONTAINING ALCOHOL: 4
HOW MANY STANDARD DRINKS CONTAINING ALCOHOL DO YOU HAVE ON A TYPICAL DAY: 1 OR 2
HOW OFTEN DO YOU HAVE SIX OR MORE DRINKS ON ONE OCCASION: 1

## 2024-10-05 ASSESSMENT — PATIENT HEALTH QUESTIONNAIRE - PHQ9
1. LITTLE INTEREST OR PLEASURE IN DOING THINGS: NOT AT ALL
SUM OF ALL RESPONSES TO PHQ QUESTIONS 1-9: 0
2. FEELING DOWN, DEPRESSED OR HOPELESS: NOT AT ALL
SUM OF ALL RESPONSES TO PHQ9 QUESTIONS 1 & 2: 0

## 2024-10-08 ENCOUNTER — OFFICE VISIT (OUTPATIENT)
Dept: FAMILY MEDICINE CLINIC | Age: 74
End: 2024-10-08
Payer: MEDICARE

## 2024-10-08 VITALS
HEIGHT: 69 IN | DIASTOLIC BLOOD PRESSURE: 52 MMHG | BODY MASS INDEX: 30.36 KG/M2 | SYSTOLIC BLOOD PRESSURE: 128 MMHG | OXYGEN SATURATION: 96 % | HEART RATE: 52 BPM | WEIGHT: 205 LBS | TEMPERATURE: 97.1 F | RESPIRATION RATE: 16 BRPM

## 2024-10-08 DIAGNOSIS — Z23 NEED FOR DIPHTHERIA-TETANUS-PERTUSSIS (TDAP) VACCINE: ICD-10-CM

## 2024-10-08 DIAGNOSIS — G47.00 INSOMNIA, UNSPECIFIED TYPE: ICD-10-CM

## 2024-10-08 DIAGNOSIS — E78.2 MIXED HYPERLIPIDEMIA: ICD-10-CM

## 2024-10-08 DIAGNOSIS — E11.65 CONTROLLED TYPE 2 DIABETES MELLITUS WITH HYPERGLYCEMIA, UNSPECIFIED WHETHER LONG TERM INSULIN USE (HCC): ICD-10-CM

## 2024-10-08 DIAGNOSIS — Z00.00 MEDICARE ANNUAL WELLNESS VISIT, SUBSEQUENT: Primary | ICD-10-CM

## 2024-10-08 DIAGNOSIS — Z86.79 S/P ABLATION OF ATRIAL FLUTTER: ICD-10-CM

## 2024-10-08 DIAGNOSIS — K51.40 PSEUDOPOLYPOSIS OF COLON WITHOUT COMPLICATION, UNSPECIFIED PART OF COLON (HCC): ICD-10-CM

## 2024-10-08 DIAGNOSIS — I10 ESSENTIAL HYPERTENSION: ICD-10-CM

## 2024-10-08 DIAGNOSIS — Z98.890 S/P ABLATION OF ATRIAL FLUTTER: ICD-10-CM

## 2024-10-08 DIAGNOSIS — Z12.5 SCREENING FOR MALIGNANT NEOPLASM OF PROSTATE: ICD-10-CM

## 2024-10-08 PROCEDURE — 3074F SYST BP LT 130 MM HG: CPT | Performed by: FAMILY MEDICINE

## 2024-10-08 PROCEDURE — 3044F HG A1C LEVEL LT 7.0%: CPT | Performed by: FAMILY MEDICINE

## 2024-10-08 PROCEDURE — 3078F DIAST BP <80 MM HG: CPT | Performed by: FAMILY MEDICINE

## 2024-10-08 PROCEDURE — G0439 PPPS, SUBSEQ VISIT: HCPCS | Performed by: FAMILY MEDICINE

## 2024-10-08 PROCEDURE — 1123F ACP DISCUSS/DSCN MKR DOCD: CPT | Performed by: FAMILY MEDICINE

## 2024-10-08 NOTE — PROGRESS NOTES
Medicare Annual Wellness Visit    Javier Wan is here for Medicare AWV and Health Maintenance (Declines flu vaccine)    Assessment & Plan   Medicare annual wellness visit, subsequent  Essential hypertension  -     CBC with Auto Differential; Future  -     Comprehensive Metabolic Panel; Future  -     Lipid Panel; Future  S/P ablation of atrial flutter  Mixed hyperlipidemia  -     Comprehensive Metabolic Panel; Future  -     Lipid Panel; Future  Controlled type 2 diabetes mellitus with hyperglycemia, unspecified whether long term insulin use (HCC)  -      DIABETES FOOT EXAM  -     Hemoglobin A1C; Future  Insomnia, unspecified type  Pseudopolyposis of colon without complication, unspecified part of colon (HCC)  Need for diphtheria-tetanus-pertussis (Tdap) vaccine  -     Tdap (ADACEL) 5-2-15.5 LF-MCG/0.5 injection; Inject 0.5 mLs into the muscle once for 1 dose, Disp-0.5 mL, R-0Print  Screening for malignant neoplasm of prostate  -     PSA Screening; Future    Plan  AWV issues reviewed- declines vaccines except TdaP  HTN is better.  Noted to be somewhat bradycardic.  Discussed SSx of hypotension and bradycardia.  No symptoms reported.  Continue to follow.   Lipids stable- on max dose Lipitor  Discuss with cardiologist about CT coronary calcium scoring given ASCVD risk.   Ongoing insomnia- discussed going back to 150 mg Trazodone.  Also cannot rule out BPH or CATRINA.  Will consider sleep medicine eval if not getting better  DM at goal.  COntinue Actos- my decrease dose at next ov.   Colonoscopy in 3+ years  Labs before next ov.        Recommendations for Preventive Services Due: see orders and patient instructions/AVS.  Recommended screening schedule for the next 5-10 years is provided to the patient in written form: see Patient Instructions/AVS.     Return in 6 months (on 4/8/2025), or if symptoms worsen or fail to improve, for DM, HYN; Medicare Annual Wellness Visit in 1 year.     Subjective         Afib,

## 2024-10-08 NOTE — PATIENT INSTRUCTIONS
Personalized Preventive Plan for Javier Wan - 10/8/2024  Medicare offers a range of preventive health benefits. Some of the tests and screenings are paid in full while other may be subject to a deductible, co-insurance, and/or copay.    Some of these benefits include a comprehensive review of your medical history including lifestyle, illnesses that may run in your family, and various assessments and screenings as appropriate.    After reviewing your medical record and screening and assessments performed today your provider may have ordered immunizations, labs, imaging, and/or referrals for you.  A list of these orders (if applicable) as well as your Preventive Care list are included within your After Visit Summary for your review.    Other Preventive Recommendations:    A preventive eye exam performed by an eye specialist is recommended every 1-2 years to screen for glaucoma; cataracts, macular degeneration, and other eye disorders.  A preventive dental visit is recommended every 6 months.  Try to get at least 150 minutes of exercise per week or 10,000 steps per day on a pedometer .  Order or download the FREE \"Exercise & Physical Activity: Your Everyday Guide\" from [employer]. Call 1-411.650.3847 or search [employer] online.  You need 8872-8677 mg of calcium and 3909-0337 IU of vitamin D per day. It is possible to meet your calcium requirement with diet alone, but a vitamin D supplement is usually necessary to meet this goal.  When exposed to the sun, use a sunscreen that protects against both UVA and UVB radiation with an SPF of 30 or greater. Reapply every 2 to 3 hours or after sweating, drying off with a towel, or swimming.  Always wear a seat belt when traveling in a car. Always wear a helmet when riding a bicycle or motorcycle.

## 2025-01-23 RX ORDER — LISINOPRIL AND HYDROCHLOROTHIAZIDE 12.5; 2 MG/1; MG/1
1 TABLET ORAL DAILY
Qty: 90 TABLET | Refills: 0 | Status: SHIPPED | OUTPATIENT
Start: 2025-01-23

## 2025-01-23 NOTE — TELEPHONE ENCOUNTER
Name of Medication(s) Requested:  Requested Prescriptions     Pending Prescriptions Disp Refills    lisinopril-hydroCHLOROthiazide (PRINZIDE;ZESTORETIC) 20-12.5 MG per tablet [Pharmacy Med Name: Lisinopril-hydroCHLOROthiazide Oral Tablet 20-12.5 MG] 90 tablet 0     Sig: TAKE ONE TABLET BY MOUTH EVERY DAY       Medication is on current medication list Yes    Dosage and directions were verified? Yes    Quantity verified: 90 day supply     Pharmacy Verified?  Yes    Last Appointment:  10/8/2024    Future appts:  Future Appointments   Date Time Provider Department Center   4/8/2025  8:30 AM Maxx Ferrera MD [place] [facility] DEP   10/16/2025  8:30 AM Maxx Ferrera MD [place] [facility] DEP        (If no appt send self scheduling link. .REFILLAPPT)  Scheduling request sent?     [] Yes  [x] No    Does patient need updated?  [] Yes  [x] No

## 2025-02-16 DIAGNOSIS — I48.0 PAROXYSMAL ATRIAL FIBRILLATION (HCC): ICD-10-CM

## 2025-02-16 DIAGNOSIS — Z79.899 MEDICATION MANAGEMENT: ICD-10-CM

## 2025-02-17 RX ORDER — METOPROLOL SUCCINATE 25 MG/1
25 TABLET, EXTENDED RELEASE ORAL DAILY
Qty: 90 TABLET | Refills: 3 | Status: SHIPPED | OUTPATIENT
Start: 2025-02-17

## 2025-02-17 NOTE — TELEPHONE ENCOUNTER
Name of Medication(s) Requested:  Requested Prescriptions     Pending Prescriptions Disp Refills    metoprolol succinate (TOPROL XL) 25 MG extended release tablet 90 tablet 3     Sig: Take 1 tablet by mouth daily       Medication is on current medication list Yes    Dosage and directions were verified? Yes    Quantity verified: 90 day supply     Pharmacy Verified?  Yes    Last Appointment:  10/8/2024    Future appts:  Future Appointments   Date Time Provider Department Center   4/8/2025  8:30 AM Maxx Ferrera MD [place] [facility] DEP   10/16/2025  8:30 AM Maxx Ferrera MD [place] [facility] DEP        (If no appt send self scheduling link. .REFILLAPPT)  Scheduling request sent?     [] Yes  [x] No    Does patient need updated?  [] Yes  [x] No

## 2025-03-06 DIAGNOSIS — G47.00 INSOMNIA, UNSPECIFIED TYPE: ICD-10-CM

## 2025-03-06 RX ORDER — TRAZODONE HYDROCHLORIDE 100 MG/1
150 TABLET ORAL NIGHTLY PRN
Qty: 135 TABLET | Refills: 1 | Status: SHIPPED | OUTPATIENT
Start: 2025-03-06

## 2025-03-06 RX ORDER — FLUTICASONE PROPIONATE 50 MCG
SPRAY, SUSPENSION (ML) NASAL
Qty: 3 EACH | Refills: 3 | Status: SHIPPED | OUTPATIENT
Start: 2025-03-06

## 2025-03-06 NOTE — TELEPHONE ENCOUNTER
Name of Medication(s) Requested:  Requested Prescriptions     Pending Prescriptions Disp Refills    traZODone (DESYREL) 100 MG tablet 135 tablet 1    fluticasone (FLONASE) 50 MCG/ACT nasal spray 3 each 3     Sig: USE 1 SPRAY NASALLY DAILY       Medication is on current medication list Yes    Dosage and directions were verified? Yes    Quantity verified: 90 day supply     Pharmacy Verified?  Yes    Last Appointment:  10/8/2024    Future appts:  Future Appointments   Date Time Provider Department Center   4/8/2025  8:30 AM Maxx Ferrera MD [place] [facility] DEP   10/16/2025  8:30 AM Maxx Ferrera MD [place] [facility] DEP        (If no appt send self scheduling link. .REFILLAPPT)  Scheduling request sent?     [] Yes  [x] No    Does patient need updated?  [] Yes  [x] No

## 2025-04-04 DIAGNOSIS — E78.2 MIXED HYPERLIPIDEMIA: ICD-10-CM

## 2025-04-04 DIAGNOSIS — Z12.5 SCREENING FOR MALIGNANT NEOPLASM OF PROSTATE: ICD-10-CM

## 2025-04-04 DIAGNOSIS — I10 ESSENTIAL HYPERTENSION: ICD-10-CM

## 2025-04-04 DIAGNOSIS — E11.65 CONTROLLED TYPE 2 DIABETES MELLITUS WITH HYPERGLYCEMIA, UNSPECIFIED WHETHER LONG TERM INSULIN USE (HCC): ICD-10-CM

## 2025-04-04 LAB
ALBUMIN: 4.2 G/DL (ref 3.5–5.2)
ALP BLD-CCNC: 67 U/L (ref 40–129)
ALT SERPL-CCNC: 17 U/L (ref 0–40)
ANION GAP SERPL CALCULATED.3IONS-SCNC: 16 MMOL/L (ref 7–16)
AST SERPL-CCNC: 20 U/L (ref 0–39)
BASOPHILS ABSOLUTE: 0.06 K/UL (ref 0–0.2)
BASOPHILS RELATIVE PERCENT: 1 % (ref 0–2)
BILIRUB SERPL-MCNC: 0.5 MG/DL (ref 0–1.2)
BUN BLDV-MCNC: 20 MG/DL (ref 6–23)
CALCIUM SERPL-MCNC: 9.5 MG/DL (ref 8.6–10.2)
CHLORIDE BLD-SCNC: 92 MMOL/L (ref 98–107)
CHOLESTEROL, TOTAL: 147 MG/DL
CO2: 24 MMOL/L (ref 22–29)
CREAT SERPL-MCNC: 1.3 MG/DL (ref 0.7–1.2)
EOSINOPHILS ABSOLUTE: 0.8 K/UL (ref 0.05–0.5)
EOSINOPHILS RELATIVE PERCENT: 9 % (ref 0–6)
GFR, ESTIMATED: 56 ML/MIN/1.73M2
GLUCOSE BLD-MCNC: 135 MG/DL (ref 74–99)
HBA1C MFR BLD: 7 % (ref 4–5.6)
HCT VFR BLD CALC: 39 % (ref 37–54)
HDLC SERPL-MCNC: 37 MG/DL
HEMOGLOBIN: 13 G/DL (ref 12.5–16.5)
IMMATURE GRANULOCYTES %: 2 % (ref 0–5)
IMMATURE GRANULOCYTES ABSOLUTE: 0.15 K/UL (ref 0–0.58)
LDL CHOLESTEROL: 88 MG/DL
LYMPHOCYTES ABSOLUTE: 2.01 K/UL (ref 1.5–4)
LYMPHOCYTES RELATIVE PERCENT: 23 % (ref 20–42)
MCH RBC QN AUTO: 30.4 PG (ref 26–35)
MCHC RBC AUTO-ENTMCNC: 33.3 G/DL (ref 32–34.5)
MCV RBC AUTO: 91.1 FL (ref 80–99.9)
MONOCYTES ABSOLUTE: 0.99 K/UL (ref 0.1–0.95)
MONOCYTES RELATIVE PERCENT: 11 % (ref 2–12)
NEUTROPHILS ABSOLUTE: 4.86 K/UL (ref 1.8–7.3)
NEUTROPHILS RELATIVE PERCENT: 55 % (ref 43–80)
PDW BLD-RTO: 12.8 % (ref 11.5–15)
PLATELET # BLD: 218 K/UL (ref 130–450)
PMV BLD AUTO: 9.9 FL (ref 7–12)
POTASSIUM SERPL-SCNC: 4 MMOL/L (ref 3.5–5)
PROSTATE SPECIFIC ANTIGEN: 3.33 NG/ML (ref 0–4)
RBC # BLD: 4.28 M/UL (ref 3.8–5.8)
SODIUM BLD-SCNC: 132 MMOL/L (ref 132–146)
TOTAL PROTEIN: 7.5 G/DL (ref 6.4–8.3)
TRIGL SERPL-MCNC: 112 MG/DL
VLDLC SERPL CALC-MCNC: 22 MG/DL
WBC # BLD: 8.9 K/UL (ref 4.5–11.5)

## 2025-04-07 ENCOUNTER — RESULTS FOLLOW-UP (OUTPATIENT)
Dept: FAMILY MEDICINE CLINIC | Age: 75
End: 2025-04-07

## 2025-04-07 SDOH — ECONOMIC STABILITY: FOOD INSECURITY: WITHIN THE PAST 12 MONTHS, THE FOOD YOU BOUGHT JUST DIDN'T LAST AND YOU DIDN'T HAVE MONEY TO GET MORE.: NEVER TRUE

## 2025-04-07 SDOH — ECONOMIC STABILITY: FOOD INSECURITY: WITHIN THE PAST 12 MONTHS, YOU WORRIED THAT YOUR FOOD WOULD RUN OUT BEFORE YOU GOT MONEY TO BUY MORE.: NEVER TRUE

## 2025-04-07 SDOH — ECONOMIC STABILITY: INCOME INSECURITY: IN THE LAST 12 MONTHS, WAS THERE A TIME WHEN YOU WERE NOT ABLE TO PAY THE MORTGAGE OR RENT ON TIME?: NO

## 2025-04-07 SDOH — ECONOMIC STABILITY: TRANSPORTATION INSECURITY
IN THE PAST 12 MONTHS, HAS LACK OF TRANSPORTATION KEPT YOU FROM MEETINGS, WORK, OR FROM GETTING THINGS NEEDED FOR DAILY LIVING?: NO

## 2025-04-07 SDOH — ECONOMIC STABILITY: TRANSPORTATION INSECURITY
IN THE PAST 12 MONTHS, HAS THE LACK OF TRANSPORTATION KEPT YOU FROM MEDICAL APPOINTMENTS OR FROM GETTING MEDICATIONS?: NO

## 2025-04-07 ASSESSMENT — PATIENT HEALTH QUESTIONNAIRE - PHQ9
2. FEELING DOWN, DEPRESSED OR HOPELESS: NOT AT ALL
SUM OF ALL RESPONSES TO PHQ QUESTIONS 1-9: 0
SUM OF ALL RESPONSES TO PHQ9 QUESTIONS 1 & 2: 0
1. LITTLE INTEREST OR PLEASURE IN DOING THINGS: NOT AT ALL
SUM OF ALL RESPONSES TO PHQ QUESTIONS 1-9: 0
SUM OF ALL RESPONSES TO PHQ QUESTIONS 1-9: 0
1. LITTLE INTEREST OR PLEASURE IN DOING THINGS: NOT AT ALL
2. FEELING DOWN, DEPRESSED OR HOPELESS: NOT AT ALL
SUM OF ALL RESPONSES TO PHQ QUESTIONS 1-9: 0

## 2025-04-07 NOTE — ASSESSMENT & PLAN NOTE
Chronic, at goal (stable), continue current treatment plan  Labs to be done prior to next visit  Orders:    CBC with Auto Differential; Future    Comprehensive Metabolic Panel; Future    Lipid Panel; Future    Hemoglobin A1C; Future    Albumin/Creatinine Ratio, Urine; Future

## 2025-04-07 NOTE — ASSESSMENT & PLAN NOTE
Chronic, at goal (stable), no recurrence, continue antiplatelet therapy per cardiology recommendations

## 2025-04-07 NOTE — ASSESSMENT & PLAN NOTE
>>ASSESSMENT AND PLAN FOR ATRIAL FIBRILLATION (HCC) WRITTEN ON 4/8/2025  9:04 AM BY MARY MANN MD

## 2025-04-07 NOTE — ASSESSMENT & PLAN NOTE
Chronic, not at goal (unstable), remains at high risk overall for cardiovascular disease.  He sees cardiology regularly.  At this time, no significant changes being recommended, remain on high-dose statin    Orders:    Lipid Panel; Future

## 2025-04-08 ENCOUNTER — OFFICE VISIT (OUTPATIENT)
Dept: FAMILY MEDICINE CLINIC | Age: 75
End: 2025-04-08
Payer: MEDICARE

## 2025-04-08 VITALS
OXYGEN SATURATION: 97 % | HEIGHT: 69 IN | WEIGHT: 205 LBS | BODY MASS INDEX: 30.36 KG/M2 | HEART RATE: 57 BPM | TEMPERATURE: 97.6 F | RESPIRATION RATE: 15 BRPM | DIASTOLIC BLOOD PRESSURE: 54 MMHG | SYSTOLIC BLOOD PRESSURE: 118 MMHG

## 2025-04-08 DIAGNOSIS — G47.00 INSOMNIA, UNSPECIFIED TYPE: ICD-10-CM

## 2025-04-08 DIAGNOSIS — Z98.890 S/P ABLATION OF ATRIAL FLUTTER: ICD-10-CM

## 2025-04-08 DIAGNOSIS — Z98.890 S/P ABLATION OF ATRIAL FIBRILLATION: ICD-10-CM

## 2025-04-08 DIAGNOSIS — E11.65 CONTROLLED TYPE 2 DIABETES MELLITUS WITH HYPERGLYCEMIA, UNSPECIFIED WHETHER LONG TERM INSULIN USE (HCC): Primary | ICD-10-CM

## 2025-04-08 DIAGNOSIS — J06.9 VIRAL URI: ICD-10-CM

## 2025-04-08 DIAGNOSIS — I10 ESSENTIAL HYPERTENSION: ICD-10-CM

## 2025-04-08 DIAGNOSIS — Z86.79 S/P ABLATION OF ATRIAL FLUTTER: ICD-10-CM

## 2025-04-08 DIAGNOSIS — K51.40 PSEUDOPOLYPOSIS OF COLON WITHOUT COMPLICATION, UNSPECIFIED PART OF COLON (HCC): ICD-10-CM

## 2025-04-08 DIAGNOSIS — E78.2 MIXED HYPERLIPIDEMIA: ICD-10-CM

## 2025-04-08 DIAGNOSIS — Z86.79 S/P ABLATION OF ATRIAL FIBRILLATION: ICD-10-CM

## 2025-04-08 PROBLEM — I48.19 PERSISTENT ATRIAL FIBRILLATION (HCC): Status: RESOLVED | Noted: 2018-06-06 | Resolved: 2025-04-08

## 2025-04-08 PROBLEM — I48.3 TYPICAL ATRIAL FLUTTER (HCC): Status: RESOLVED | Noted: 2018-08-02 | Resolved: 2025-04-08

## 2025-04-08 PROCEDURE — 1123F ACP DISCUSS/DSCN MKR DOCD: CPT | Performed by: FAMILY MEDICINE

## 2025-04-08 PROCEDURE — 3078F DIAST BP <80 MM HG: CPT | Performed by: FAMILY MEDICINE

## 2025-04-08 PROCEDURE — 99214 OFFICE O/P EST MOD 30 MIN: CPT | Performed by: FAMILY MEDICINE

## 2025-04-08 PROCEDURE — 3051F HG A1C>EQUAL 7.0%<8.0%: CPT | Performed by: FAMILY MEDICINE

## 2025-04-08 PROCEDURE — 3074F SYST BP LT 130 MM HG: CPT | Performed by: FAMILY MEDICINE

## 2025-04-08 PROCEDURE — 1159F MED LIST DOCD IN RCRD: CPT | Performed by: FAMILY MEDICINE

## 2025-04-08 ASSESSMENT — ENCOUNTER SYMPTOMS
BLOOD IN STOOL: 0
TROUBLE SWALLOWING: 0
COUGH: 1
SHORTNESS OF BREATH: 0
ABDOMINAL PAIN: 0
SINUS PRESSURE: 0

## 2025-05-12 RX ORDER — LISINOPRIL AND HYDROCHLOROTHIAZIDE 12.5; 2 MG/1; MG/1
1 TABLET ORAL DAILY
Qty: 90 TABLET | Refills: 1 | Status: SHIPPED | OUTPATIENT
Start: 2025-05-12

## 2025-05-12 NOTE — TELEPHONE ENCOUNTER
Name of Medication(s) Requested:  Requested Prescriptions     Pending Prescriptions Disp Refills    lisinopril-hydroCHLOROthiazide (PRINZIDE;ZESTORETIC) 20-12.5 MG per tablet 90 tablet 0     Sig: Take 1 tablet by mouth daily       Medication is on current medication list Yes    Dosage and directions were verified? Yes    Quantity verified: 90 day supply     Pharmacy Verified?  Yes    Last Appointment:  4/8/2025    Future appts:  Future Appointments   Date Time Provider Department Center   10/16/2025  8:30 AM Maxx Ferrera MD [facility] [facility] DEP        (If no appt send self scheduling link. .REFILLAPPT)  Scheduling request sent?     [] Yes  [x] No    Does patient need updated?  [] Yes  [x] No

## 2025-06-30 RX ORDER — PIOGLITAZONE 30 MG/1
30 TABLET ORAL DAILY
Qty: 90 TABLET | Refills: 3 | Status: SHIPPED | OUTPATIENT
Start: 2025-06-30

## 2025-06-30 NOTE — TELEPHONE ENCOUNTER
Name of Medication(s) Requested:  Requested Prescriptions     Pending Prescriptions Disp Refills    pioglitazone (ACTOS) 30 MG tablet 90 tablet 3     Sig: Take 1 tablet by mouth daily       Medication is on current medication list Yes    Dosage and directions were verified? Yes    Quantity verified: 90 day supply     Pharmacy Verified?  Yes    Last Appointment:  4/8/2025    Future appts:  Future Appointments   Date Time Provider Department Center   10/16/2025  8:30 AM Maxx Ferrera MD [place] [facility] DEP        (If no appt send self scheduling link. .REFILLAPPT)  Scheduling request sent?     [] Yes  [x] No    Does patient need updated?  [] Yes  [x] No

## 2025-08-31 DIAGNOSIS — Z79.899 MEDICATION MANAGEMENT: ICD-10-CM

## 2025-09-02 RX ORDER — ATORVASTATIN CALCIUM 80 MG/1
80 TABLET, FILM COATED ORAL DAILY
Qty: 90 TABLET | Refills: 3 | Status: SHIPPED | OUTPATIENT
Start: 2025-09-02

## (undated) DEVICE — TRAP POLYP ETRAP

## (undated) DEVICE — SPONGE GZ W4XL4IN RAYON POLY FILL CVR W/ NONWOVEN FAB STERILE

## (undated) DEVICE — SNARE ENDOSCP L240CM LOOP W13MM SHTH DIA2.4MM SM OVL FLX

## (undated) DEVICE — ELECTRODE PT RET AD L9FT HI MOIST COND ADH HYDRGEL CORDED

## (undated) DEVICE — FORCEPS BX L240CM JAW DIA2.4MM ORNG L CAP W/ NDL DISP RAD

## (undated) DEVICE — GRADUATE TRIANG MEASURE 1000ML BLK PRNT